# Patient Record
Sex: MALE | Race: WHITE | Employment: UNEMPLOYED | URBAN - METROPOLITAN AREA
[De-identification: names, ages, dates, MRNs, and addresses within clinical notes are randomized per-mention and may not be internally consistent; named-entity substitution may affect disease eponyms.]

---

## 2018-06-27 ENCOUNTER — HOSPITAL ENCOUNTER (OUTPATIENT)
Dept: GENERAL RADIOLOGY | Age: 53
Discharge: HOME | End: 2018-06-29
Payer: MEDICAID

## 2018-06-27 ENCOUNTER — HOSPITAL ENCOUNTER (OUTPATIENT)
Age: 53
Discharge: HOME | End: 2018-06-27
Payer: MEDICAID

## 2018-06-27 DIAGNOSIS — R52 PAIN: ICD-10-CM

## 2018-06-27 PROCEDURE — 73564 X-RAY EXAM KNEE 4 OR MORE: CPT

## 2018-06-27 PROCEDURE — 72110 X-RAY EXAM L-2 SPINE 4/>VWS: CPT

## 2018-07-10 ENCOUNTER — OFFICE VISIT (OUTPATIENT)
Dept: FAMILY MEDICINE CLINIC | Age: 53
End: 2018-07-10
Payer: MEDICAID

## 2018-07-10 VITALS
OXYGEN SATURATION: 98 % | HEART RATE: 90 BPM | DIASTOLIC BLOOD PRESSURE: 60 MMHG | SYSTOLIC BLOOD PRESSURE: 120 MMHG | BODY MASS INDEX: 19.27 KG/M2 | HEIGHT: 70 IN | RESPIRATION RATE: 18 BRPM | WEIGHT: 134.6 LBS

## 2018-07-10 DIAGNOSIS — E78.5 DYSLIPIDEMIA: ICD-10-CM

## 2018-07-10 DIAGNOSIS — R73.01 IFG (IMPAIRED FASTING GLUCOSE): ICD-10-CM

## 2018-07-10 DIAGNOSIS — Z72.0 TOBACCO ABUSE: ICD-10-CM

## 2018-07-10 DIAGNOSIS — R53.83 FATIGUE, UNSPECIFIED TYPE: ICD-10-CM

## 2018-07-10 DIAGNOSIS — R05.9 COUGH: ICD-10-CM

## 2018-07-10 DIAGNOSIS — Z12.11 SCREEN FOR COLON CANCER: ICD-10-CM

## 2018-07-10 DIAGNOSIS — I10 ESSENTIAL HYPERTENSION: Primary | ICD-10-CM

## 2018-07-10 DIAGNOSIS — M15.9 PRIMARY OSTEOARTHRITIS INVOLVING MULTIPLE JOINTS: ICD-10-CM

## 2018-07-10 DIAGNOSIS — Z12.5 SCREENING PSA (PROSTATE SPECIFIC ANTIGEN): ICD-10-CM

## 2018-07-10 PROBLEM — M15.0 PRIMARY OSTEOARTHRITIS INVOLVING MULTIPLE JOINTS: Status: ACTIVE | Noted: 2018-07-10

## 2018-07-10 PROCEDURE — 3017F COLORECTAL CA SCREEN DOC REV: CPT | Performed by: FAMILY MEDICINE

## 2018-07-10 PROCEDURE — 4004F PT TOBACCO SCREEN RCVD TLK: CPT | Performed by: FAMILY MEDICINE

## 2018-07-10 PROCEDURE — G8420 CALC BMI NORM PARAMETERS: HCPCS | Performed by: FAMILY MEDICINE

## 2018-07-10 PROCEDURE — 99203 OFFICE O/P NEW LOW 30 MIN: CPT | Performed by: FAMILY MEDICINE

## 2018-07-10 PROCEDURE — G8427 DOCREV CUR MEDS BY ELIG CLIN: HCPCS | Performed by: FAMILY MEDICINE

## 2018-07-10 RX ORDER — NAPROXEN 500 MG/1
500 TABLET ORAL 2 TIMES DAILY WITH MEALS
Qty: 180 TABLET | Refills: 1 | Status: SHIPPED | OUTPATIENT
Start: 2018-07-10 | End: 2018-07-17 | Stop reason: SINTOL

## 2018-07-10 RX ORDER — HYDROCODONE BITARTRATE AND ACETAMINOPHEN 10; 325 MG/1; MG/1
TABLET ORAL
Refills: 0 | COMMUNITY
Start: 2018-07-08 | End: 2018-07-17 | Stop reason: SINTOL

## 2018-07-10 RX ORDER — CYCLOBENZAPRINE HCL 10 MG
10 TABLET ORAL 3 TIMES DAILY PRN
Status: ON HOLD | COMMUNITY
End: 2020-07-01 | Stop reason: HOSPADM

## 2018-07-10 RX ORDER — NICOTINE 21 MG/24HR
1 PATCH, TRANSDERMAL 24 HOURS TRANSDERMAL EVERY 24 HOURS
Qty: 30 PATCH | Refills: 1 | Status: SHIPPED | OUTPATIENT
Start: 2018-07-10 | End: 2019-07-25

## 2018-07-10 ASSESSMENT — ENCOUNTER SYMPTOMS
ORTHOPNEA: 0
BLOOD IN STOOL: 0
DIARRHEA: 0
HEMOPTYSIS: 0
SINUS PAIN: 0
VOMITING: 0
DOUBLE VISION: 0
GASTROINTESTINAL NEGATIVE: 1
SORE THROAT: 0
CONSTIPATION: 0
SHORTNESS OF BREATH: 0
ABDOMINAL PAIN: 0
EYES NEGATIVE: 1
EYE DISCHARGE: 0
NAUSEA: 0
BLURRED VISION: 0
HEARTBURN: 0
RESPIRATORY NEGATIVE: 1
EYE PAIN: 0
SPUTUM PRODUCTION: 0
COUGH: 0
BACK PAIN: 1
WHEEZING: 0
STRIDOR: 0
PHOTOPHOBIA: 0
EYE REDNESS: 0

## 2018-07-10 ASSESSMENT — PATIENT HEALTH QUESTIONNAIRE - PHQ9
1. LITTLE INTEREST OR PLEASURE IN DOING THINGS: 0
SUM OF ALL RESPONSES TO PHQ QUESTIONS 1-9: 0
SUM OF ALL RESPONSES TO PHQ9 QUESTIONS 1 & 2: 0
2. FEELING DOWN, DEPRESSED OR HOPELESS: 0

## 2018-07-10 NOTE — PATIENT INSTRUCTIONS
Patient Education        Knee Arthritis: Exercises  Your Care Instructions  Here are some examples of exercises for knee arthritis. Start each exercise slowly. Ease off the exercise if you start to have pain. Your doctor or physical therapist will tell you when you can start these exercises and which ones will work best for you. How to do the exercises  Knee flexion with heel slide    1. Lie on your back with your knees bent. 2. Slide your heel back by bending your affected knee as far as you can. Then hook your other foot around your ankle to help pull your heel even farther back. 3. Hold for about 6 seconds, then rest for up to 10 seconds. 4. Repeat 8 to 12 times. 5. Switch legs and repeat steps 1 through 4, even if only one knee is sore. Quad sets    1. Sit with your affected leg straight and supported on the floor or a firm bed. Place a small, rolled-up towel under your knee. Your other leg should be bent, with that foot flat on the floor. 2. Tighten the thigh muscles of your affected leg by pressing the back of your knee down into the towel. 3. Hold for about 6 seconds, then rest for up to 10 seconds. 4. Repeat 8 to 12 times. 5. Switch legs and repeat steps 1 through 4, even if only one knee is sore. Straight-leg raises to the front    1. Lie on your back with your good knee bent so that your foot rests flat on the floor. Your affected leg should be straight. Make sure that your low back has a normal curve. You should be able to slip your hand in between the floor and the small of your back, with your palm touching the floor and your back touching the back of your hand. 2. Tighten the thigh muscles in your affected leg by pressing the back of your knee flat down to the floor. Hold your knee straight. 3. Keeping the thigh muscles tight and your leg straight, lift your affected leg up so that your heel is about 12 inches off the floor. Hold for about 6 seconds, then lower slowly.   4. Relax for up to 10 seconds between repetitions. 5. Repeat 8 to 12 times. 6. Switch legs and repeat steps 1 through 5, even if only one knee is sore. Active knee flexion    1. Lie on your stomach with your knees straight. If your kneecap is uncomfortable, roll up a washcloth and put it under your leg just above your kneecap. 2. Lift the foot of your affected leg by bending the knee so that you bring the foot up toward your buttock. If this motion hurts, try it without bending your knee quite as far. This may help you avoid any painful motion. 3. Slowly move your leg up and down. 4. Repeat 8 to 12 times. 5. Switch legs and repeat steps 1 through 4, even if only one knee is sore. Quadriceps stretch (facedown)    1. Lie flat on your stomach, and rest your face on the floor. 2. Wrap a towel or belt strap around the lower part of your affected leg. Then use the towel or belt strap to slowly pull your heel toward your buttock until you feel a stretch. 3. Hold for about 15 to 30 seconds, then relax your leg against the towel or belt strap. 4. Repeat 2 to 4 times. 5. Switch legs and repeat steps 1 through 4, even if only one knee is sore. Stationary exercise bike    1. If you do not have a stationary exercise bike at home, you can find one to ride at your local health club or community center. 2. Adjust the height of the bike seat so that your knee is slightly bent when your leg is extended downward. If your knee hurts when the pedal reaches the top, you can raise the seat so that your knee does not bend as much. 3. Start slowly. At first, try to do 5 to 10 minutes of cycling with little to no resistance. Then increase your time and the resistance bit by bit until you can do 20 to 30 minutes without pain. 4. If you start to have pain, rest your knee until your pain gets back to the level that is normal for you. Or cycle for less time or with less effort. Follow-up care is a key part of your treatment and safety.  Be sure seconds. 9. Repeat 8 to 12 times. 10. Switch legs and repeat steps 1 through 4, even if only one knee is sore. Straight-leg raises to the front    7. Lie on your back with your good knee bent so that your foot rests flat on the floor. Your affected leg should be straight. Make sure that your low back has a normal curve. You should be able to slip your hand in between the floor and the small of your back, with your palm touching the floor and your back touching the back of your hand. 8. Tighten the thigh muscles in your affected leg by pressing the back of your knee flat down to the floor. Hold your knee straight. 9. Keeping the thigh muscles tight and your leg straight, lift your affected leg up so that your heel is about 12 inches off the floor. Hold for about 6 seconds, then lower slowly. 10. Relax for up to 10 seconds between repetitions. 11. Repeat 8 to 12 times. 12. Switch legs and repeat steps 1 through 5, even if only one knee is sore. Active knee flexion    6. Lie on your stomach with your knees straight. If your kneecap is uncomfortable, roll up a washcloth and put it under your leg just above your kneecap. 7. Lift the foot of your affected leg by bending the knee so that you bring the foot up toward your buttock. If this motion hurts, try it without bending your knee quite as far. This may help you avoid any painful motion. 8. Slowly move your leg up and down. 9. Repeat 8 to 12 times. 10. Switch legs and repeat steps 1 through 4, even if only one knee is sore. Quadriceps stretch (facedown)    6. Lie flat on your stomach, and rest your face on the floor. 7. Wrap a towel or belt strap around the lower part of your affected leg. Then use the towel or belt strap to slowly pull your heel toward your buttock until you feel a stretch. 8. Hold for about 15 to 30 seconds, then relax your leg against the towel or belt strap. 9. Repeat 2 to 4 times.   10. Switch legs and repeat steps 1 through 4, even if only one knee is sore. Stationary exercise bike    5. If you do not have a stationary exercise bike at home, you can find one to ride at your local health club or community center. 6. Adjust the height of the bike seat so that your knee is slightly bent when your leg is extended downward. If your knee hurts when the pedal reaches the top, you can raise the seat so that your knee does not bend as much. 7. Start slowly. At first, try to do 5 to 10 minutes of cycling with little to no resistance. Then increase your time and the resistance bit by bit until you can do 20 to 30 minutes without pain. 8. If you start to have pain, rest your knee until your pain gets back to the level that is normal for you. Or cycle for less time or with less effort. Follow-up care is a key part of your treatment and safety. Be sure to make and go to all appointments, and call your doctor if you are having problems. It's also a good idea to know your test results and keep a list of the medicines you take. Where can you learn more? Go to [email or web address].[email or web address] org and sign in to your [employer] account. Enter C159 in the [employer] box to learn more about \"Knee Arthritis: Exercises. \"     If you do not have an account, please click on the \"Sign Up Now\" link. Current as of: November 29, 2017  Content Version: 11.6  © 6164-5804 [employer]. Care instructions adapted under license by [facility] [place] ([facility]). If you have questions about a medical condition or this instruction, always ask your healthcare professional. [address] any warranty or liability for your use of this information.

## 2018-07-10 NOTE — PROGRESS NOTES
discharge and redness. Respiratory: Negative. Negative for cough, hemoptysis, sputum production, shortness of breath, wheezing and stridor. Cardiovascular: Negative. Negative for chest pain, palpitations, orthopnea, claudication, leg swelling and PND. Gastrointestinal: Negative. Negative for abdominal pain, blood in stool, constipation, diarrhea, heartburn, melena, nausea and vomiting. Genitourinary: Negative. Negative for dysuria, flank pain, frequency, hematuria and urgency. Musculoskeletal: Positive for back pain and joint pain (bilateral knee pain). Negative for falls, myalgias and neck pain. Skin: Negative. Negative for itching and rash. Neurological: Negative. Negative for dizziness, tingling, tremors, sensory change, speech change, focal weakness, seizures, loss of consciousness, weakness, numbness and headaches. Endo/Heme/Allergies: Negative. Negative for environmental allergies and polydipsia. Does not bruise/bleed easily. Psychiatric/Behavioral: Negative. Past Medical/Surgical Hx;  Reviewed with patient  History reviewed. No pertinent past medical history. History reviewed. No pertinent surgical history. Past Family Hx:  Reviewed with patient  History reviewed. No pertinent family history. Social Hx:  Reviewed with patient  Social History   Substance Use Topics    Smoking status: Current Every Day Smoker     Packs/day: 1.00     Types: Cigarettes     Start date: 1/1/1983    Smokeless tobacco: Never Used    Alcohol use Not on file       OBJECTIVE  /60   Pulse 90   Resp 18   Ht 5' 10\" (1.778 m)   Wt 134 lb 9.6 oz (61.1 kg)   SpO2 98%   BMI 19.31 kg/m²     Problem List:  Curtis Lopez  does not have any pertinent problems on file. PHYS EX:  Physical Exam   Constitutional: He is oriented to person, place, and time. He appears well-developed and well-nourished. No distress. HENT:   Head: Normocephalic and atraumatic.    Right Ear: External ear normal.   Left Ear: External ear normal.   Nose: Nose normal.   Mouth/Throat: Oropharynx is clear and moist. No oropharyngeal exudate. Eyes: Conjunctivae and EOM are normal. Pupils are equal, round, and reactive to light. Right eye exhibits no discharge. Left eye exhibits no discharge. No scleral icterus. Neck: Normal range of motion. Neck supple. No JVD present. No tracheal deviation present. No thyromegaly present. Cardiovascular: Normal rate, regular rhythm and normal heart sounds. Exam reveals no gallop and no friction rub. No murmur heard. Pulmonary/Chest: Effort normal and breath sounds normal. No stridor. No respiratory distress. He has no wheezes. He has no rales. He exhibits no tenderness. Abdominal: Soft. Bowel sounds are normal. He exhibits no distension and no mass. There is no tenderness. There is no rebound and no guarding. No hernia. Genitourinary: Rectum normal and penis normal. Rectal exam shows guaiac negative stool. No penile tenderness. Genitourinary Comments: BPH   Musculoskeletal: Normal range of motion. He exhibits tenderness. He exhibits no edema or deformity. Bilateral knee pains    Lymphadenopathy:     He has no cervical adenopathy. Neurological: He is alert and oriented to person, place, and time. He has normal reflexes. He displays normal reflexes. No cranial nerve deficit or sensory deficit. He exhibits normal muscle tone. Coordination normal.   Skin: Skin is warm. No rash noted. He is not diaphoretic. No erythema. No pallor. Nursing note and vitals reviewed. ASSESSMENT/PLAN  Mamta Adams was seen today for new patient and knee pain. Diagnoses and all orders for this visit:    Essential hypertension  -     Comprehensive Metabolic Panel; Future    Screen for colon cancer  -     POCT Fecal Immunochemical Test (FIT); Future    IFG (impaired fasting glucose)  -     Comprehensive Metabolic Panel;  Future  -     CBC Auto Differential; Future  -     Hemoglobin A1C; Future    Dyslipidemia  - Comprehensive Metabolic Panel; Future  -     Lipid Panel; Future  -     CBC Auto Differential; Future    Fatigue, unspecified type  -     TSH without Reflex; Future    Screening PSA (prostate specific antigen)  -     Psa screening; Future    Primary osteoarthritis involving multiple joints  -     naproxen (NAPROSYN) 500 MG tablet; Take 1 tablet by mouth 2 times daily (with meals)  -     [address] and Pro Stewart MD  --PLAN--inject right and left PSIS x 2                1/2 cc xylocaine plus 1 cc depo medrol                 Omt/ultra--Rx    Cough  -     XR CHEST STANDARD (2 VW); Future    Tobacco abuse  -     nicotine (NICODERM CQ) 21 MG/24HR; Place 1 patch onto the skin every 24 hours        Outpatient Encounter Prescriptions as of 7/10/2018   Medication Sig Dispense Refill    HYDROcodone-acetaminophen (NORCO)  MG per tablet take 1 tablet by mouth every 8 hours if needed for pain  0    cyclobenzaprine (FLEXERIL) 10 MG tablet Take 10 mg by mouth 3 times daily as needed for Muscle spasms      naproxen (NAPROSYN) 500 MG tablet Take 1 tablet by mouth 2 times daily (with meals) 180 tablet 1    nicotine (NICODERM CQ) 21 MG/24HR Place 1 patch onto the skin every 24 hours 30 patch 1     No facility-administered encounter medications on file as of 7/10/2018. Return in about 3 months (around 10/10/2018).         Reviewed recent labs related to Christiano's current problems      Discussed importance of regular Health Maintenance follow up  Health Maintenance   Topic    Hepatitis C screen     HIV screen     DTaP/Tdap/Td vaccine (1 - Tdap)    Lipid screen     Shingles Vaccine (1 of 2 - 2 Dose Series)    Colon cancer screen colonoscopy     Flu vaccine (1)

## 2018-07-17 ENCOUNTER — TELEPHONE (OUTPATIENT)
Dept: FAMILY MEDICINE CLINIC | Age: 53
End: 2018-07-17

## 2018-07-17 RX ORDER — IBUPROFEN 800 MG/1
800 TABLET ORAL EVERY 8 HOURS PRN
Qty: 90 TABLET | Refills: 1 | Status: SHIPPED | OUTPATIENT
Start: 2018-07-17 | End: 2018-10-19 | Stop reason: SDUPTHER

## 2018-07-17 NOTE — TELEPHONE ENCOUNTER
Pt left message for [address] stating that the Naproxen prescribed to him has been giving him GI upset. Pt [facility]  [facility]. Please advise.     Electronically signed by Ryan Enriquez MA on 7/17/18 at 10:45 AM

## 2018-08-08 ENCOUNTER — OFFICE VISIT (OUTPATIENT)
Dept: ORTHOPEDIC SURGERY | Age: 53
End: 2018-08-08
Payer: MEDICAID

## 2018-08-08 VITALS
SYSTOLIC BLOOD PRESSURE: 137 MMHG | WEIGHT: 138 LBS | TEMPERATURE: 98.6 F | HEART RATE: 86 BPM | HEIGHT: 70 IN | BODY MASS INDEX: 19.76 KG/M2 | DIASTOLIC BLOOD PRESSURE: 88 MMHG

## 2018-08-08 DIAGNOSIS — M25.562 CHRONIC PAIN OF BOTH KNEES: ICD-10-CM

## 2018-08-08 DIAGNOSIS — M25.561 PAIN IN BOTH KNEES, UNSPECIFIED CHRONICITY: Primary | ICD-10-CM

## 2018-08-08 DIAGNOSIS — M25.562 PAIN IN BOTH KNEES, UNSPECIFIED CHRONICITY: Primary | ICD-10-CM

## 2018-08-08 DIAGNOSIS — M25.561 CHRONIC PAIN OF BOTH KNEES: ICD-10-CM

## 2018-08-08 DIAGNOSIS — M54.41 CHRONIC BILATERAL LOW BACK PAIN WITH BILATERAL SCIATICA: ICD-10-CM

## 2018-08-08 DIAGNOSIS — M54.42 CHRONIC BILATERAL LOW BACK PAIN WITH BILATERAL SCIATICA: ICD-10-CM

## 2018-08-08 DIAGNOSIS — G89.29 CHRONIC BILATERAL LOW BACK PAIN WITH BILATERAL SCIATICA: ICD-10-CM

## 2018-08-08 DIAGNOSIS — G89.29 CHRONIC PAIN OF BOTH KNEES: ICD-10-CM

## 2018-08-08 PROBLEM — M54.50 CHRONIC BILATERAL LOW BACK PAIN WITHOUT SCIATICA: Status: ACTIVE | Noted: 2018-08-08

## 2018-08-08 PROBLEM — M54.40 CHRONIC BILATERAL LOW BACK PAIN WITH SCIATICA: Status: ACTIVE | Noted: 2018-08-08

## 2018-08-08 PROCEDURE — 3017F COLORECTAL CA SCREEN DOC REV: CPT | Performed by: ORTHOPAEDIC SURGERY

## 2018-08-08 PROCEDURE — 4004F PT TOBACCO SCREEN RCVD TLK: CPT | Performed by: ORTHOPAEDIC SURGERY

## 2018-08-08 PROCEDURE — G8420 CALC BMI NORM PARAMETERS: HCPCS | Performed by: ORTHOPAEDIC SURGERY

## 2018-08-08 PROCEDURE — 99203 OFFICE O/P NEW LOW 30 MIN: CPT | Performed by: ORTHOPAEDIC SURGERY

## 2018-08-08 PROCEDURE — G8427 DOCREV CUR MEDS BY ELIG CLIN: HCPCS | Performed by: ORTHOPAEDIC SURGERY

## 2018-08-08 RX ORDER — HYDROCODONE BITARTRATE AND ACETAMINOPHEN 5; 325 MG/1; MG/1
1 TABLET ORAL EVERY 6 HOURS PRN
Refills: 0 | COMMUNITY
Start: 2018-07-30 | End: 2020-07-15

## 2018-08-08 NOTE — PROGRESS NOTES
Chief Complaint:   Chief Complaint   Patient presents with    Knee Pain     Bilateral knee pain X 2 yrs. No injury. Xrays 6/27/18 [facility] ([facility]). PUJA Bhatt is a 48 y.o. male, who presents With chronic but progressive bilateral knee pain with subjective weakness and stiffness. No history of injury in these regards, symptoms are interfering with activities especially those involving transfers stairclimbing and ambulation. Patient gives a significant history of chronic back pain and stiffness with apparently some degree of sciatica with radiating pain or numbness into both lower extremities. Has not had specific evaluation or treatment in that regard. Pain has not responded to activity modification or oral medications as listed. Allergies; medications; past medical, surgical, family, and social history; and problem list have been reviewed today and updated as indicated in this encounter - see below following Ortho specifics. Musculoskeletal: Upper extremities intact leg lengths equal hip motion painless bilaterally. Bilateral knees are straight stable no laxity deformity or effusion full range of motion although discrete snapping heard with deep flexion squat. Negative Elvia's, no synovitis. Radiologic Studies: Recent x-rays of bilateral knees are reviewed in Epic, they show normal joint spaces without evidence of degenerative or traumatic issue. ASSESSMENT/PLAN:    Elizabeth Arce was seen today for knee pain. Diagnoses and all orders for this visit:    Pain in both knees, unspecified chronicity    Chronic bilateral low back pain with bilateral sciatica  -     [facility] Keila Gilbert DO    Chronic pain of both knees  -     [facility]- Keila Gilbert DO     Treatment options were reviewed, I think the patient's underlying root cause most likely central possibly lumbar stenosis.  I find no evidence for structural issue with hips or knees. Patient was referred to physical medicine and rehab for consultation in these regards. Questions asked and answered follow-up here as needed. Return if symptoms worsen or fail to improve. Purvi Pierre MD    8/8/2018  2:04 PM      Patient Active Problem List   Diagnosis    Primary osteoarthritis involving multiple joints    Tobacco abuse    Chronic bilateral low back pain with sciatica    Chronic pain of both knees       History reviewed. No pertinent past medical history. History reviewed. No pertinent surgical history. Current Outpatient Prescriptions   Medication Sig Dispense Refill    HYDROcodone-acetaminophen (NORCO)  MG per tablet take 1 tablet by mouth every 8 hours if needed for pain  0    ibuprofen (ADVIL;MOTRIN) 800 MG tablet Take 1 tablet by mouth every 8 hours as needed for Pain 90 tablet 1    cyclobenzaprine (FLEXERIL) 10 MG tablet Take 10 mg by mouth 3 times daily as needed for Muscle spasms      nicotine (NICODERM CQ) 21 MG/24HR Place 1 patch onto the skin every 24 hours 30 patch 1     No current facility-administered medications for this visit.         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

## 2018-08-27 ENCOUNTER — HOSPITAL ENCOUNTER (OUTPATIENT)
Age: 53
Discharge: HOME | End: 2018-08-27
Payer: MEDICAID

## 2018-08-27 ENCOUNTER — HOSPITAL ENCOUNTER (OUTPATIENT)
Dept: GENERAL RADIOLOGY | Age: 53
Discharge: HOME | End: 2018-08-29
Payer: MEDICAID

## 2018-08-27 ENCOUNTER — HOSPITAL ENCOUNTER (OUTPATIENT)
Age: 53
Discharge: HOME | End: 2018-08-29
Payer: MEDICAID

## 2018-08-27 DIAGNOSIS — E78.5 DYSLIPIDEMIA: ICD-10-CM

## 2018-08-27 DIAGNOSIS — Z12.5 SCREENING PSA (PROSTATE SPECIFIC ANTIGEN): ICD-10-CM

## 2018-08-27 DIAGNOSIS — I10 ESSENTIAL HYPERTENSION: ICD-10-CM

## 2018-08-27 DIAGNOSIS — R53.83 FATIGUE, UNSPECIFIED TYPE: ICD-10-CM

## 2018-08-27 DIAGNOSIS — R05.9 COUGH: ICD-10-CM

## 2018-08-27 DIAGNOSIS — R73.01 IFG (IMPAIRED FASTING GLUCOSE): ICD-10-CM

## 2018-08-27 LAB
ALBUMIN SERPL-MCNC: 4.3 G/DL (ref 3.5–5.2)
ALP BLD-CCNC: 75 U/L (ref 40–129)
ALT SERPL-CCNC: 7 U/L (ref 0–40)
ANION GAP SERPL CALCULATED.3IONS-SCNC: 10 MMOL/L (ref 7–16)
AST SERPL-CCNC: 13 U/L (ref 0–39)
BASOPHILS ABSOLUTE: 0.07 E9/L (ref 0–0.2)
BASOPHILS RELATIVE PERCENT: 0.5 % (ref 0–2)
BILIRUB SERPL-MCNC: 0.6 MG/DL (ref 0–1.2)
BUN BLDV-MCNC: 5 MG/DL (ref 6–20)
CALCIUM SERPL-MCNC: 9.7 MG/DL (ref 8.6–10.2)
CHLORIDE BLD-SCNC: 103 MMOL/L (ref 98–107)
CHOLESTEROL, TOTAL: 226 MG/DL (ref 0–199)
CO2: 28 MMOL/L (ref 22–29)
CREAT SERPL-MCNC: 0.8 MG/DL (ref 0.7–1.2)
EOSINOPHILS ABSOLUTE: 0.12 E9/L (ref 0.05–0.5)
EOSINOPHILS RELATIVE PERCENT: 0.9 % (ref 0–6)
GFR AFRICAN AMERICAN: >60
GFR NON-AFRICAN AMERICAN: >60 ML/MIN/1.73
GLUCOSE BLD-MCNC: 108 MG/DL (ref 74–109)
HBA1C MFR BLD: 5.3 % (ref 4–5.6)
HCT VFR BLD CALC: 46.3 % (ref 37–54)
HDLC SERPL-MCNC: 46 MG/DL
HEMOGLOBIN: 15.7 G/DL (ref 12.5–16.5)
IMMATURE GRANULOCYTES #: 0.05 E9/L
IMMATURE GRANULOCYTES %: 0.4 % (ref 0–5)
LDL CHOLESTEROL CALCULATED: 151 MG/DL (ref 0–99)
LYMPHOCYTES ABSOLUTE: 2.68 E9/L (ref 1.5–4)
LYMPHOCYTES RELATIVE PERCENT: 20 % (ref 20–42)
MCH RBC QN AUTO: 31 PG (ref 26–35)
MCHC RBC AUTO-ENTMCNC: 33.9 % (ref 32–34.5)
MCV RBC AUTO: 91.3 FL (ref 80–99.9)
MONOCYTES ABSOLUTE: 0.89 E9/L (ref 0.1–0.95)
MONOCYTES RELATIVE PERCENT: 6.6 % (ref 2–12)
NEUTROPHILS ABSOLUTE: 9.62 E9/L (ref 1.8–7.3)
NEUTROPHILS RELATIVE PERCENT: 71.6 % (ref 43–80)
PDW BLD-RTO: 13.3 FL (ref 11.5–15)
PLATELET # BLD: 460 E9/L (ref 130–450)
PMV BLD AUTO: 8.5 FL (ref 7–12)
POTASSIUM SERPL-SCNC: 5 MMOL/L (ref 3.5–5)
PROSTATE SPECIFIC ANTIGEN: 1.94 NG/ML (ref 0–4)
RBC # BLD: 5.07 E12/L (ref 3.8–5.8)
SODIUM BLD-SCNC: 141 MMOL/L (ref 132–146)
TOTAL PROTEIN: 7.3 G/DL (ref 6.4–8.3)
TRIGL SERPL-MCNC: 144 MG/DL (ref 0–149)
TSH SERPL DL<=0.05 MIU/L-ACNC: 2.79 UIU/ML (ref 0.27–4.2)
VLDLC SERPL CALC-MCNC: 29 MG/DL
WBC # BLD: 13.4 E9/L (ref 4.5–11.5)

## 2018-08-27 PROCEDURE — 80061 LIPID PANEL: CPT

## 2018-08-27 PROCEDURE — 80053 COMPREHEN METABOLIC PANEL: CPT

## 2018-08-27 PROCEDURE — 85025 COMPLETE CBC W/AUTO DIFF WBC: CPT

## 2018-08-27 PROCEDURE — 84443 ASSAY THYROID STIM HORMONE: CPT

## 2018-08-27 PROCEDURE — 36415 COLL VENOUS BLD VENIPUNCTURE: CPT

## 2018-08-27 PROCEDURE — G0103 PSA SCREENING: HCPCS

## 2018-08-27 PROCEDURE — 71046 X-RAY EXAM CHEST 2 VIEWS: CPT

## 2018-08-27 PROCEDURE — 83036 HEMOGLOBIN GLYCOSYLATED A1C: CPT

## 2018-09-02 ENCOUNTER — APPOINTMENT (OUTPATIENT)
Dept: CT IMAGING | Age: 53
End: 2018-09-02
Payer: MEDICAID

## 2018-09-02 ENCOUNTER — APPOINTMENT (OUTPATIENT)
Dept: GENERAL RADIOLOGY | Age: 53
End: 2018-09-02
Payer: MEDICAID

## 2018-09-02 ENCOUNTER — HOSPITAL ENCOUNTER (EMERGENCY)
Age: 53
Discharge: HOME | End: 2018-09-02
Attending: EMERGENCY MEDICINE
Payer: MEDICAID

## 2018-09-02 VITALS
TEMPERATURE: 98.1 F | HEIGHT: 70 IN | HEART RATE: 82 BPM | DIASTOLIC BLOOD PRESSURE: 70 MMHG | WEIGHT: 140 LBS | BODY MASS INDEX: 20.04 KG/M2 | OXYGEN SATURATION: 97 % | RESPIRATION RATE: 14 BRPM | SYSTOLIC BLOOD PRESSURE: 131 MMHG

## 2018-09-02 DIAGNOSIS — S30.1XXA CONTUSION OF ABDOMINAL WALL, INITIAL ENCOUNTER: ICD-10-CM

## 2018-09-02 DIAGNOSIS — S09.90XA INJURY OF HEAD, INITIAL ENCOUNTER: ICD-10-CM

## 2018-09-02 DIAGNOSIS — S62.502A CLOSED NONDISPLACED FRACTURE OF PHALANX OF LEFT THUMB, UNSPECIFIED PHALANX, INITIAL ENCOUNTER: ICD-10-CM
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
## 2018-09-02 DIAGNOSIS — S20.212A CONTUSION OF LEFT CHEST WALL, INITIAL ENCOUNTER: ICD-10-CM

## 2018-09-02 LAB
ALBUMIN SERPL-MCNC: 4.3 G/DL (ref 3.5–5.2)
ALP BLD-CCNC: 75 U/L (ref 40–129)
ALT SERPL-CCNC: 12 U/L (ref 0–40)
ANION GAP SERPL CALCULATED.3IONS-SCNC: 17 MMOL/L (ref 7–16)
AST SERPL-CCNC: 23 U/L (ref 0–39)
BILIRUB SERPL-MCNC: 0.4 MG/DL (ref 0–1.2)
BUN BLDV-MCNC: 11 MG/DL (ref 6–20)
CALCIUM SERPL-MCNC: 9.2 MG/DL (ref 8.6–10.2)
CHLORIDE BLD-SCNC: 94 MMOL/L (ref 98–107)
CO2: 23 MMOL/L (ref 22–29)
CREAT SERPL-MCNC: 1 MG/DL (ref 0.7–1.2)
GFR AFRICAN AMERICAN: >60
GFR NON-AFRICAN AMERICAN: >60 ML/MIN/1.73
GLUCOSE BLD-MCNC: 105 MG/DL (ref 74–109)
HCT VFR BLD CALC: 42.6 % (ref 37–54)
HEMOGLOBIN: 14.8 G/DL (ref 12.5–16.5)
MCH RBC QN AUTO: 31.4 PG (ref 26–35)
MCHC RBC AUTO-ENTMCNC: 34.7 % (ref 32–34.5)
MCV RBC AUTO: 90.3 FL (ref 80–99.9)
PDW BLD-RTO: 13.4 FL (ref 11.5–15)
PLATELET # BLD: 483 E9/L (ref 130–450)
PMV BLD AUTO: 8.9 FL (ref 7–12)
POTASSIUM SERPL-SCNC: 4.5 MMOL/L (ref 3.5–5)
RBC # BLD: 4.72 E12/L (ref 3.8–5.8)
SODIUM BLD-SCNC: 134 MMOL/L (ref 132–146)
TOTAL PROTEIN: 7.3 G/DL (ref 6.4–8.3)
WBC # BLD: 17.7 E9/L (ref 4.5–11.5)

## 2018-09-02 PROCEDURE — 80053 COMPREHEN METABOLIC PANEL: CPT

## 2018-09-02 PROCEDURE — 73030 X-RAY EXAM OF SHOULDER: CPT

## 2018-09-02 PROCEDURE — 99284 EMERGENCY DEPT VISIT MOD MDM: CPT

## 2018-09-02 PROCEDURE — 71250 CT THORAX DX C-: CPT

## 2018-09-02 PROCEDURE — 71045 X-RAY EXAM CHEST 1 VIEW: CPT

## 2018-09-02 PROCEDURE — 85027 COMPLETE CBC AUTOMATED: CPT

## 2018-09-02 PROCEDURE — 70450 CT HEAD/BRAIN W/O DYE: CPT

## 2018-09-02 PROCEDURE — 6360000004 HC RX CONTRAST MEDICATION: Performed by: RADIOLOGY

## 2018-09-02 PROCEDURE — 73130 X-RAY EXAM OF HAND: CPT

## 2018-09-02 PROCEDURE — 74177 CT ABD & PELVIS W/CONTRAST: CPT

## 2018-09-02 PROCEDURE — 72125 CT NECK SPINE W/O DYE: CPT

## 2018-09-02 PROCEDURE — 6370000000 HC RX 637 (ALT 250 FOR IP)

## 2018-09-02 PROCEDURE — 36415 COLL VENOUS BLD VENIPUNCTURE: CPT

## 2018-09-02 RX ORDER — OXYCODONE HYDROCHLORIDE AND ACETAMINOPHEN 5; 325 MG/1; MG/1
TABLET ORAL
Status: COMPLETED
Start: 2018-09-02 | End: 2018-09-02

## 2018-09-02 RX ORDER — OXYCODONE HYDROCHLORIDE AND ACETAMINOPHEN 5; 325 MG/1; MG/1
1 TABLET ORAL ONCE
Status: COMPLETED | OUTPATIENT
Start: 2018-09-02 | End: 2018-09-02

## 2018-09-02 RX ORDER — OXYCODONE HYDROCHLORIDE AND ACETAMINOPHEN 5; 325 MG/1; MG/1
1 TABLET ORAL EVERY 6 HOURS PRN
Qty: 12 TABLET | Refills: 0 | Status: SHIPPED | OUTPATIENT
Start: 2018-09-02 | End: 2018-09-05

## 2018-09-02 RX ADMIN — OXYCODONE HYDROCHLORIDE AND ACETAMINOPHEN 1 TABLET: 5; 325 TABLET ORAL at 06:26

## 2018-09-02 RX ADMIN — IOPAMIDOL 110 ML: 755 INJECTION, SOLUTION INTRAVENOUS at 03:41

## 2018-09-02 ASSESSMENT — PAIN SCALES - GENERAL
PAINLEVEL_OUTOF10: 10
PAINLEVEL_OUTOF10: 10

## 2018-09-02 ASSESSMENT — PAIN DESCRIPTION - PAIN TYPE: TYPE: ACUTE PAIN

## 2018-09-02 ASSESSMENT — PAIN DESCRIPTION - DESCRIPTORS: DESCRIPTORS: SHARP

## 2018-09-02 ASSESSMENT — PAIN DESCRIPTION - ONSET: ONSET: SUDDEN

## 2018-09-02 ASSESSMENT — PAIN DESCRIPTION - PROGRESSION: CLINICAL_PROGRESSION: GRADUALLY WORSENING

## 2018-09-02 ASSESSMENT — PAIN DESCRIPTION - FREQUENCY: FREQUENCY: CONTINUOUS

## 2018-09-02 ASSESSMENT — PAIN DESCRIPTION - ORIENTATION: ORIENTATION: LEFT

## 2018-09-02 ASSESSMENT — PAIN DESCRIPTION - LOCATION: LOCATION: SHOULDER

## 2018-09-02 NOTE — ED PROVIDER NOTES
edema  Neck: C collar in place  Respiratory: Lungs clear to auscultation bilaterally, no wheezes, rales, or rhonchi. Not in respiratory distress  Cardiovascular:  Regular rate. Regular rhythm. No murmurs, gallops, or rubs. 2+ distal pulses  Chest: No chest wall tenderness  GI:  Abdomen Soft, LUQ and LLQ tenderness, Non distended. +BS. No rebound, guarding, or rigidity. No pulsatile masses. Musculoskeletal: Moves all extremities x 4. Warm and well perfused, tenderness over left shoulder and left thumb. Limited ROM to LUE secondary to pain, good pulses, abrasion to left elbow, tenderness to left hand as well as thumb  Integument: skin warm and dry. No rashes. Neurologic: GCS 15, moves extremities somewhat limited to left upper extremity secondary to pain  Psychiatric: Normal Affect      -------------------------------------------------- RESULTS -------------------------------------------------  I have personally reviewed all laboratory and imaging results for this patient. Results are listed below.      LABS:  Results for orders placed or performed during the hospital encounter of 09/02/18   CBC   Result Value Ref Range    WBC 17.7 (H) 4.5 - 11.5 E9/L    RBC 4.72 3.80 - 5.80 E12/L    Hemoglobin 14.8 12.5 - 16.5 g/dL    Hematocrit 42.6 37.0 - 54.0 %    MCV 90.3 80.0 - 99.9 fL    MCH 31.4 26.0 - 35.0 pg    MCHC 34.7 (H) 32.0 - 34.5 %    RDW 13.4 11.5 - 15.0 fL    Platelets 469 (H) 608 - 450 E9/L    MPV 8.9 7.0 - 12.0 fL   Comprehensive Metabolic Panel   Result Value Ref Range    Sodium 134 132 - 146 mmol/L    Potassium 4.5 3.5 - 5.0 mmol/L    Chloride 94 (L) 98 - 107 mmol/L    CO2 23 22 - 29 mmol/L    Anion Gap 17 (H) 7 - 16 mmol/L    Glucose 105 74 - 109 mg/dL    BUN 11 6 - 20 mg/dL    CREATININE 1.0 0.7 - 1.2 mg/dL    GFR Non-African American >60 >=60 mL/min/1.73    GFR African American >60     Calcium 9.2 8.6 - 10.2 mg/dL    Total Protein 7.3 6.4 - 8.3 g/dL    Alb 4.3 3.5 - 5.2 g/dL    Total Bilirubin 0.4 0.0 - hydronephrosis. Stomach and bowel:  Mild liquid stool in the colon. No obstruction. No       mucosal thickening. PELVIS:     Appendix:  Normal appendix. Bladder:  Unremarkable. No mass. Reproductive:  Unremarkable as visualized. ABDOMEN and PELVIS:     Intraperitoneal space:  Unremarkable. No free air. No significant    fluid    collection. Bones/joints:  Intra-articular fracture of the base of the left first    proximal phalanx. No acute fracture. No dislocation. Soft tissues:  Unremarkable. Vasculature:  Vascular calcifications. No abdominal aortic aneurysm. Lymph nodes:  Unremarkable. No enlarged lymph nodes. IMPRESSION:        1. Intra-articular fracture of the base of the left first proximal    phalanx. 2.  Mild liquid stool in the colon. Addendum created by Dr. Tonya Osborne MD on 9/2/2018 5:02:15 AM EDT   Intra-articular fracture of the base of the first proximal phalanx of the    left    hand. This addendum has been electronically signed by Tonya Osborne MD.      Final   1. Intra-articular fracture of the base of the left first proximal phalanx. 2.  Mild liquid stool in the colon. This report has been electronically signed by Tonya Osborne MD.      CT Cervical Spine WO Contrast   Final Result   1. No fracture. 2.  Mild centrilobular and paraseptal emphysema. This report has been electronically signed by Tonya Osborne MD.      CT Head WO Contrast   Final Result     No acute intracranial abnormality. This report has been electronically signed by Tonya Osborne MD.      CT Chest WO Contrast    (Results Pending)         ------------------------- NURSING NOTES AND VITALS REVIEWED ---------------------------   The nursing notes within the ED encounter and vital signs as below have been reviewed by myself.   /80   Pulse 88   Temp 98.1 °F (36.7 °C) (Temporal)   Resp 16   Ht 5' 10\" (1.778 m)   Wt 140 lb (63.5 kg)   SpO2 96%

## 2018-09-05 ENCOUNTER — OFFICE VISIT (OUTPATIENT)
Dept: FAMILY MEDICINE CLINIC | Age: 53
End: 2018-09-05
Payer: MEDICAID

## 2018-09-05 VITALS
WEIGHT: 135.6 LBS | RESPIRATION RATE: 18 BRPM | HEIGHT: 66 IN | BODY MASS INDEX: 21.79 KG/M2 | HEART RATE: 84 BPM | SYSTOLIC BLOOD PRESSURE: 116 MMHG | DIASTOLIC BLOOD PRESSURE: 70 MMHG | OXYGEN SATURATION: 96 %

## 2018-09-05 DIAGNOSIS — J43.2 CENTRILOBULAR EMPHYSEMA (HCC): ICD-10-CM

## 2018-09-05 DIAGNOSIS — S62.502D CLOSED NONDISPLACED FRACTURE OF PHALANX OF LEFT THUMB WITH ROUTINE HEALING, UNSPECIFIED PHALANX, SUBSEQUENT ENCOUNTER: ICD-10-CM

## 2018-09-05 DIAGNOSIS — E78.5 DYSLIPIDEMIA: ICD-10-CM

## 2018-09-05 DIAGNOSIS — R53.83 FATIGUE, UNSPECIFIED TYPE: ICD-10-CM

## 2018-09-05 DIAGNOSIS — S20.212D CONTUSION OF RIB ON LEFT SIDE, SUBSEQUENT ENCOUNTER: Primary | ICD-10-CM

## 2018-09-05 DIAGNOSIS — R73.01 IFG (IMPAIRED FASTING GLUCOSE): ICD-10-CM

## 2018-09-05 PROBLEM — S20.212A CONTUSION OF RIB ON LEFT SIDE: Status: ACTIVE | Noted: 2018-09-05

## 2018-09-05 PROCEDURE — 3017F COLORECTAL CA SCREEN DOC REV: CPT | Performed by: FAMILY MEDICINE

## 2018-09-05 PROCEDURE — G8926 SPIRO NO PERF OR DOC: HCPCS | Performed by: FAMILY MEDICINE

## 2018-09-05 PROCEDURE — 3023F SPIROM DOC REV: CPT | Performed by: FAMILY MEDICINE

## 2018-09-05 PROCEDURE — G8420 CALC BMI NORM PARAMETERS: HCPCS | Performed by: FAMILY MEDICINE

## 2018-09-05 PROCEDURE — G8427 DOCREV CUR MEDS BY ELIG CLIN: HCPCS | Performed by: FAMILY MEDICINE

## 2018-09-05 PROCEDURE — 99214 OFFICE O/P EST MOD 30 MIN: CPT | Performed by: FAMILY MEDICINE

## 2018-09-05 PROCEDURE — 4004F PT TOBACCO SCREEN RCVD TLK: CPT | Performed by: FAMILY MEDICINE

## 2018-09-05 ASSESSMENT — ENCOUNTER SYMPTOMS
SPUTUM PRODUCTION: 0
BACK PAIN: 1
SINUS PAIN: 0
HEARTBURN: 0
VOMITING: 0
ABDOMINAL PAIN: 0
EYE PAIN: 0
SHORTNESS OF BREATH: 0
BLURRED VISION: 0
DOUBLE VISION: 0
GASTROINTESTINAL NEGATIVE: 1
STRIDOR: 0
EYES NEGATIVE: 1
EYE REDNESS: 0
PHOTOPHOBIA: 0
RESPIRATORY NEGATIVE: 1
COUGH: 0
HEMOPTYSIS: 0
CONSTIPATION: 0
BLOOD IN STOOL: 0
WHEEZING: 0
EYE DISCHARGE: 0
ORTHOPNEA: 0
NAUSEA: 0
SORE THROAT: 0
DIARRHEA: 0
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
## 2018-09-05 NOTE — PATIENT INSTRUCTIONS
air. Air pollution, chemical fumes, and dust also can make emphysema worse. · Get a flu shot every year. A shot may keep the flu from turning into something more serious, like pneumonia. A flu shot also may lower your chances of having a flare-up. · Get a pneumococcal shot. A shot can prevent some of the serious complications of pneumonia. Ask your doctor how often you should get this shot. How is emphysema treated? Emphysema is treated with medicines and oxygen. You also can take steps at home to stay healthy and keep your condition from getting worse. Medicines and oxygen therapy  · You may be taking medicines such as:  ¨ Bronchodilators. These help open your airways and make breathing easier. Bronchodilators are either short-acting (work for 6 to 9 hours) or long-acting (work for 24 hours). You inhale most bronchodilators, so they start to act quickly. Always carry your quick-relief inhaler with you in case you need it while you are away from home. ¨ Corticosteroids. These reduce airway inflammation. They come in pill or inhaled form. You must take these medicines every day for them to work well. ¨ Antibiotics. These medicines are used when you have a bacterial lung infection. · Take your medicines exactly as prescribed. Call your doctor if you think you are having a problem with your medicine. · Oxygen therapy boosts the amount of oxygen in your blood and helps you breathe easier. Use the flow rate your doctor has recommended, and do not change it without talking to your doctor first.  Other care at home  · If your doctor recommends it, get more exercise. Walking is a good choice. Bit by bit, increase the amount you walk every day. Try for at least 30 minutes on most days of the week. · Learn breathing methods-such as breathing through pursed lips-to help you become less short of breath.   · If your doctor has not set you up with a pulmonary rehabilitation program, talk to him or her about whether rehab is right for you. Rehab includes exercise programs, education about your disease and how to manage it, help with diet and other changes, and emotional support. · Eat regular, healthy meals. Use bronchodilators about 1 hour before you eat to make it easier to eat. Eat several small meals instead of three large ones. Drink beverages at the end of the meal. Avoid foods that are hard to chew. Follow-up care is a key part of your treatment and safety. Be sure to make and go to all appointments, and call your doctor if you are having problems. It's also a good idea to know your test results and keep a list of the medicines you take. Where can you learn more? Go to https://[facility]. org and sign in to your [employer] account. Enter B933 in the Decisionlink box to learn more about \"Learning About Emphysema. \"     If you do not have an account, please click on the \"Sign Up Now\" link. Current as of: December 6, 2017  Content Version: 11.7  © 7576-9285 [employer]. Care instructions adapted under license by [facility] ([facility]). If you have questions about a medical condition or this instruction, always ask your healthcare professional. [place] [address] any warranty or liability for your use of this information.

## 2018-09-07 ASSESSMENT — PATIENT HEALTH QUESTIONNAIRE - PHQ9
SUM OF ALL RESPONSES TO PHQ9 QUESTIONS 1 & 2: 2
SUM OF ALL RESPONSES TO PHQ QUESTIONS 1-9: 2
2. FEELING DOWN, DEPRESSED OR HOPELESS: 1
1. LITTLE INTEREST OR PLEASURE IN DOING THINGS: 1
SUM OF ALL RESPONSES TO PHQ QUESTIONS 1-9: 2

## 2018-09-07 ASSESSMENT — ENCOUNTER SYMPTOMS: RHINORRHEA: 0

## 2018-09-13 ENCOUNTER — TELEPHONE (OUTPATIENT)
Dept: FAMILY MEDICINE CLINIC | Age: 53
End: 2018-09-13

## 2018-09-20 ENCOUNTER — OFFICE VISIT (OUTPATIENT)
Dept: PHYSICAL MEDICINE AND REHAB | Age: 53
End: 2018-09-20
Payer: MEDICAID

## 2018-09-20 VITALS
BODY MASS INDEX: 19.33 KG/M2 | OXYGEN SATURATION: 100 % | HEIGHT: 70 IN | DIASTOLIC BLOOD PRESSURE: 70 MMHG | SYSTOLIC BLOOD PRESSURE: 112 MMHG | WEIGHT: 135 LBS | HEART RATE: 74 BPM

## 2018-09-20 DIAGNOSIS — M54.42 CHRONIC BILATERAL LOW BACK PAIN WITH BILATERAL SCIATICA: Primary | ICD-10-CM

## 2018-09-20 DIAGNOSIS — M47.816 SPONDYLOSIS OF LUMBAR REGION WITHOUT MYELOPATHY OR RADICULOPATHY: ICD-10-CM

## 2018-09-20 DIAGNOSIS — G89.29 CHRONIC BILATERAL LOW BACK PAIN WITH BILATERAL SCIATICA: Primary | ICD-10-CM

## 2018-09-20 DIAGNOSIS — M43.16 SPONDYLOLISTHESIS OF LUMBAR REGION: ICD-10-CM

## 2018-09-20 DIAGNOSIS — M54.41 CHRONIC BILATERAL LOW BACK PAIN WITH BILATERAL SCIATICA: Primary | ICD-10-CM

## 2018-09-20 PROCEDURE — 99204 OFFICE O/P NEW MOD 45 MIN: CPT | Performed by: PHYSICAL MEDICINE & REHABILITATION

## 2018-09-20 PROCEDURE — G8420 CALC BMI NORM PARAMETERS: HCPCS | Performed by: PHYSICAL MEDICINE & REHABILITATION

## 2018-09-20 PROCEDURE — G8427 DOCREV CUR MEDS BY ELIG CLIN: HCPCS | Performed by: PHYSICAL MEDICINE & REHABILITATION

## 2018-09-20 PROCEDURE — 3017F COLORECTAL CA SCREEN DOC REV: CPT | Performed by: PHYSICAL MEDICINE & REHABILITATION

## 2018-09-20 PROCEDURE — 4004F PT TOBACCO SCREEN RCVD TLK: CPT | Performed by: PHYSICAL MEDICINE & REHABILITATION

## 2018-09-20 RX ORDER — DULOXETIN HYDROCHLORIDE 20 MG/1
CAPSULE, DELAYED RELEASE ORAL
Qty: 18 CAPSULE | Refills: 0 | Status: SHIPPED | OUTPATIENT
Start: 2018-09-20 | End: 2019-01-11 | Stop reason: SINTOL

## 2018-09-20 RX ORDER — FAMOTIDINE 20 MG/1
20 TABLET, FILM COATED ORAL PRN
COMMUNITY
End: 2019-04-08

## 2018-09-20 RX ORDER — DULOXETIN HYDROCHLORIDE 60 MG/1
60 CAPSULE, DELAYED RELEASE ORAL DAILY
Qty: 30 CAPSULE | Refills: 0 | Status: SHIPPED | OUTPATIENT
Start: 2018-09-20 | End: 2019-01-11 | Stop reason: SINTOL

## 2018-09-20 NOTE — PROGRESS NOTES
Refill    famotidine (PEPCID) 20 MG tablet Take 20 mg by mouth as needed      DULoxetine (CYMBALTA) 20 MG extended release capsule Take 1tab daily for 3d then 2 tab daily for 3 days then 3 tab daily X3d then call for new dose. 18 capsule 0    DULoxetine (CYMBALTA) 60 MG extended release capsule Take 1 capsule by mouth daily Start after titration with 20 mg complete 30 capsule 0    HYDROcodone-acetaminophen (NORCO)  MG per tablet take 1 tablet by mouth every 8 hours if needed for pain  0    ibuprofen (ADVIL;MOTRIN) 800 MG tablet Take 1 tablet by mouth every 8 hours as needed for Pain 90 tablet 1    cyclobenzaprine (FLEXERIL) 10 MG tablet Take 10 mg by mouth 3 times daily as needed for Muscle spasms      nicotine (NICODERM CQ) 21 MG/24HR Place 1 patch onto the skin every 24 hours 30 patch 1     No current facility-administered medications for this visit. Review of Systems - For review of systems, positive symptoms are underlined and negative findings are not underlined. General: chills, fatigue, fever, malaise, night sweats, weight gain,  weight loss. Psychological: anxiety, depression, suicidal ideation, sleep disturbances, behavioral disorder, difficulty concentrating, disorientation, hallucinations, mood swings, obsessive thoughts, physical abuse,  sexual abuse. Ophthalmic: blurry vision, decreased vision, double vision, loss of vision, photophobia, use of corrective device. Ear Nose Throat: hearing loss, tinnitus, phonophobia, sensitivity to smells, vertigo, or vocal changes. Allergy/Immunology: seasonal allergies, watery eyes, itchy eyes, frequent infections. Hematological and Lymphatic: bleeding problems, blood clots, bruising,  yellowing of the skin, swollen lymph nodes. Endocrine:  polydypsia, polyuria, temperature intolerance. Respiratory: cough, shortness of breath, wheezing. Cardiovascular: syncope, chest pain, dyspnea on exertion, edema, irregular heartbeat,  palpitations. Gastrointestinal: abdominal pain, constipation, diarrhea,  decreased appetite, heartburn, hematemesis, melena, nausea, vomiting, stool incontinence, abnormal swallowing. Genito-Urinary: dysuria, hematuria, incontinence, frequency, urgency. Musculoskeletal: joint pain, stiffness, swelling, muscle pain, muscle  tenderness. Neurological: confusion, memory loss, dizziness, gait disturbance, headaches, impaired coordination, decreased balance, numbness/tingling, seizures, speech problems, tremors,weakness. Dermatological:  hair changes, nail changes, pruritus, rash. Physical Exam: Blood pressure 112/70, pulse 74, height 5' 10\" (1.778 m), weight 135 lb (61.2 kg), SpO2 100 %. General: The patient is in no apparent distress. Body habitus is non-obese. HEENT: No rhinorrhea, sneezing, yawning, or lacrimation. No scleral icterus or conjunctival injection. SKIN: No piloerection. No track marks. No rash. Normal turgor. No erythema or ecchymosis. Psychological: Mood and affect are appropriate. Hygiene is appropriate. Cardiovascular:  Heart is regular rate and rhythm. Peripheral pulses are 2+ at the dorsalis pedis, posterior tibial and radial arteries. There is no edema. Respiratory: Respirations are regular and unlabored. There is no cyanosis. Lymphatic: There is no cervical or inguinal lymphadenopathy. Gastrointestinal: Soft abdomen, non-tender. No pulsating abdominal mass. Genitourinary: No costovertebral angle tenderness. MSK: Lumbar:  Cervical lordosis normal,  thoracic kyphosis normal and lumbar lordosis reduced. No hairy patch, cafe au lait, nevi, hemangioma or dimpling over lumbar area. Pelvis level, no scoliosis, leg length equal. Seated and standing flexion tests negative. There is no step off deformity. No superficial or bony tenderness.  Lumbar AROM in flexion is 30 degrees, in extension is 10 degrees, in left rotation is 20 degrees, in right rotation is 20 degrees, in left lateral flexion is 20 degrees and in right

## 2018-10-19 RX ORDER — IBUPROFEN 800 MG/1
TABLET ORAL
Qty: 90 TABLET | Refills: 1 | Status: SHIPPED | OUTPATIENT
Start: 2018-10-19 | End: 2019-02-05 | Stop reason: SDUPTHER

## 2018-10-25 ENCOUNTER — OFFICE VISIT (OUTPATIENT)
Dept: PHYSICAL MEDICINE AND REHAB | Age: 53
End: 2018-10-25
Payer: MEDICAID

## 2018-10-25 VITALS
BODY MASS INDEX: 19.9 KG/M2 | DIASTOLIC BLOOD PRESSURE: 84 MMHG | SYSTOLIC BLOOD PRESSURE: 139 MMHG | WEIGHT: 139 LBS | HEART RATE: 82 BPM | HEIGHT: 70 IN

## 2018-10-25 DIAGNOSIS — G89.29 CHRONIC BILATERAL LOW BACK PAIN WITH BILATERAL SCIATICA: Primary | ICD-10-CM

## 2018-10-25 DIAGNOSIS — M54.41 CHRONIC BILATERAL LOW BACK PAIN WITH BILATERAL SCIATICA: Primary | ICD-10-CM

## 2018-10-25 DIAGNOSIS — M47.819 FACET ARTHROPATHY: ICD-10-CM

## 2018-10-25 DIAGNOSIS — M54.42 CHRONIC BILATERAL LOW BACK PAIN WITH BILATERAL SCIATICA: Primary | ICD-10-CM

## 2018-10-25 DIAGNOSIS — M47.812 SPONDYLOSIS OF CERVICAL REGION WITHOUT MYELOPATHY OR RADICULOPATHY: ICD-10-CM

## 2018-10-25 PROCEDURE — 4004F PT TOBACCO SCREEN RCVD TLK: CPT | Performed by: PHYSICAL MEDICINE & REHABILITATION

## 2018-10-25 PROCEDURE — 99213 OFFICE O/P EST LOW 20 MIN: CPT | Performed by: PHYSICAL MEDICINE & REHABILITATION

## 2018-10-25 PROCEDURE — G8420 CALC BMI NORM PARAMETERS: HCPCS | Performed by: PHYSICAL MEDICINE & REHABILITATION

## 2018-10-25 PROCEDURE — 3017F COLORECTAL CA SCREEN DOC REV: CPT | Performed by: PHYSICAL MEDICINE & REHABILITATION

## 2018-10-25 PROCEDURE — G8484 FLU IMMUNIZE NO ADMIN: HCPCS | Performed by: PHYSICAL MEDICINE & REHABILITATION

## 2018-10-25 PROCEDURE — G8427 DOCREV CUR MEDS BY ELIG CLIN: HCPCS | Performed by: PHYSICAL MEDICINE & REHABILITATION

## 2018-10-25 NOTE — PROGRESS NOTES
1. Chronic bilateral low back pain with bilateral sciatica    2. Facet arthropathy    3. Spondylosis of cervical region without myelopathy or radiculopathy        Plan:  Orders Placed This Encounter   Procedures   [address] [facility]     Referral Priority:   Routine     Referral Type:   Eval and Treat     Referral Reason:   Specialty Services Required     Requested Specialty:   Physical Therapy     Number of Visits Requested:   1       The patient was educated about the diagnosis, prognosis, indications, risks and benefits of treatment. An opportunity to ask questions was given to the patient and questions were answered. The patient agreed to proceed with the recommended treatment as described above. Follow up 2 months     Thank you for allowing me to participate in the care of your patient. [employer] D.O., P.T.   Board Certified Physical Medicine and Rehabilitation  Board Certified Electrodiagnostic Medicine

## 2019-01-11 ENCOUNTER — OFFICE VISIT (OUTPATIENT)
Dept: PHYSICAL MEDICINE AND REHAB | Age: 54
End: 2019-01-11
Payer: MEDICAID

## 2019-01-11 VITALS
SYSTOLIC BLOOD PRESSURE: 145 MMHG | BODY MASS INDEX: 20.62 KG/M2 | DIASTOLIC BLOOD PRESSURE: 86 MMHG | HEIGHT: 70 IN | WEIGHT: 144 LBS | HEART RATE: 80 BPM

## 2019-01-11 DIAGNOSIS — M15.9 PRIMARY OSTEOARTHRITIS INVOLVING MULTIPLE JOINTS: ICD-10-CM

## 2019-01-11 DIAGNOSIS — M54.42 CHRONIC BILATERAL LOW BACK PAIN WITH BILATERAL SCIATICA: Primary | ICD-10-CM

## 2019-01-11 DIAGNOSIS — M54.41 CHRONIC BILATERAL LOW BACK PAIN WITH BILATERAL SCIATICA: Primary | ICD-10-CM

## 2019-01-11 DIAGNOSIS — M25.69 BACK STIFFNESS: ICD-10-CM

## 2019-01-11 DIAGNOSIS — M47.816 LUMBAR SPONDYLOSIS: ICD-10-CM

## 2019-01-11 DIAGNOSIS — G89.29 CHRONIC BILATERAL LOW BACK PAIN WITH BILATERAL SCIATICA: Primary | ICD-10-CM

## 2019-01-11 PROCEDURE — 99214 OFFICE O/P EST MOD 30 MIN: CPT | Performed by: PHYSICAL MEDICINE & REHABILITATION

## 2019-01-11 PROCEDURE — G8484 FLU IMMUNIZE NO ADMIN: HCPCS | Performed by: PHYSICAL MEDICINE & REHABILITATION

## 2019-01-11 PROCEDURE — 3017F COLORECTAL CA SCREEN DOC REV: CPT | Performed by: PHYSICAL MEDICINE & REHABILITATION

## 2019-01-11 PROCEDURE — G8420 CALC BMI NORM PARAMETERS: HCPCS | Performed by: PHYSICAL MEDICINE & REHABILITATION

## 2019-01-11 PROCEDURE — 4004F PT TOBACCO SCREEN RCVD TLK: CPT | Performed by: PHYSICAL MEDICINE & REHABILITATION

## 2019-01-11 PROCEDURE — G8427 DOCREV CUR MEDS BY ELIG CLIN: HCPCS | Performed by: PHYSICAL MEDICINE & REHABILITATION

## 2019-01-11 RX ORDER — NORTRIPTYLINE HYDROCHLORIDE 50 MG/1
50 CAPSULE ORAL NIGHTLY
Qty: 30 CAPSULE | Refills: 0 | Status: SHIPPED | OUTPATIENT
Start: 2019-01-11 | End: 2019-02-11 | Stop reason: ALTCHOICE

## 2019-01-23 ENCOUNTER — HOSPITAL ENCOUNTER (OUTPATIENT)
Dept: PHYSICAL THERAPY | Age: 54
Setting detail: THERAPIES SERIES
Discharge: HOME | End: 2019-01-23
Payer: MEDICAID

## 2019-01-23 PROCEDURE — 97161 PT EVAL LOW COMPLEX 20 MIN: CPT | Performed by: PHYSICAL THERAPIST

## 2019-01-23 ASSESSMENT — PAIN DESCRIPTION - ORIENTATION: ORIENTATION: RIGHT;LEFT

## 2019-01-23 ASSESSMENT — PAIN DESCRIPTION - ONSET: ONSET: ON-GOING

## 2019-01-23 ASSESSMENT — PAIN - FUNCTIONAL ASSESSMENT: PAIN_FUNCTIONAL_ASSESSMENT: PREVENTS OR INTERFERES WITH ALL ACTIVE AND SOME PASSIVE ACTIVITIES

## 2019-01-23 ASSESSMENT — PAIN DESCRIPTION - LOCATION: LOCATION: BACK;KNEE

## 2019-01-23 ASSESSMENT — PAIN SCALES - GENERAL: PAINLEVEL_OUTOF10: 7

## 2019-01-23 ASSESSMENT — PAIN DESCRIPTION - PAIN TYPE: TYPE: CHRONIC PAIN

## 2019-01-23 ASSESSMENT — PAIN DESCRIPTION - PROGRESSION: CLINICAL_PROGRESSION: NOT CHANGED

## 2019-01-23 ASSESSMENT — PAIN DESCRIPTION - FREQUENCY: FREQUENCY: CONTINUOUS

## 2019-01-25 ENCOUNTER — HOSPITAL ENCOUNTER (OUTPATIENT)
Dept: PHYSICAL THERAPY | Age: 54
Setting detail: THERAPIES SERIES
Discharge: HOME | End: 2019-01-25
Payer: MEDICAID

## 2019-01-25 PROCEDURE — 97110 THERAPEUTIC EXERCISES: CPT | Performed by: PHYSICAL THERAPIST

## 2019-02-01 ENCOUNTER — HOSPITAL ENCOUNTER (OUTPATIENT)
Dept: PHYSICAL THERAPY | Age: 54
Setting detail: THERAPIES SERIES
Discharge: HOME | End: 2019-02-01
Payer: MEDICAID

## 2019-02-05 RX ORDER — IBUPROFEN 800 MG/1
TABLET ORAL
Qty: 90 TABLET | Refills: 0 | Status: SHIPPED | OUTPATIENT
Start: 2019-02-05 | End: 2019-04-02 | Stop reason: SDUPTHER

## 2019-02-06 ENCOUNTER — HOSPITAL ENCOUNTER (OUTPATIENT)
Dept: PHYSICAL THERAPY | Age: 54
Setting detail: THERAPIES SERIES
Discharge: HOME | End: 2019-02-06
Payer: MEDICAID

## 2019-02-06 PROCEDURE — 97110 THERAPEUTIC EXERCISES: CPT | Performed by: PHYSICAL THERAPIST

## 2019-02-08 ENCOUNTER — HOSPITAL ENCOUNTER (OUTPATIENT)
Dept: PHYSICAL THERAPY | Age: 54
Setting detail: THERAPIES SERIES
Discharge: HOME | End: 2019-02-08
Payer: MEDICAID

## 2019-02-08 PROCEDURE — 97110 THERAPEUTIC EXERCISES: CPT | Performed by: PHYSICAL THERAPIST

## 2019-02-11 ENCOUNTER — TELEPHONE (OUTPATIENT)
Dept: PHYSICAL MEDICINE AND REHAB | Age: 54
End: 2019-02-11

## 2019-02-11 RX ORDER — AMITRIPTYLINE HYDROCHLORIDE 25 MG/1
25 TABLET, FILM COATED ORAL NIGHTLY
Qty: 30 TABLET | Refills: 0 | Status: SHIPPED | OUTPATIENT
Start: 2019-02-11 | End: 2019-03-06

## 2019-02-13 ENCOUNTER — HOSPITAL ENCOUNTER (OUTPATIENT)
Dept: PHYSICAL THERAPY | Age: 54
Setting detail: THERAPIES SERIES
Discharge: HOME | End: 2019-02-13
Payer: MEDICAID

## 2019-02-15 ENCOUNTER — HOSPITAL ENCOUNTER (OUTPATIENT)
Dept: PHYSICAL THERAPY | Age: 54
Setting detail: THERAPIES SERIES
Discharge: HOME | End: 2019-02-15
Payer: MEDICAID

## 2019-02-15 PROCEDURE — 97110 THERAPEUTIC EXERCISES: CPT | Performed by: PHYSICAL THERAPIST

## 2019-02-20 ENCOUNTER — HOSPITAL ENCOUNTER (OUTPATIENT)
Dept: PHYSICAL THERAPY | Age: 54
Setting detail: THERAPIES SERIES
Discharge: HOME | End: 2019-02-20
Payer: MEDICAID

## 2019-02-20 PROCEDURE — 97110 THERAPEUTIC EXERCISES: CPT | Performed by: PHYSICAL THERAPIST

## 2019-02-22 ENCOUNTER — HOSPITAL ENCOUNTER (OUTPATIENT)
Dept: PHYSICAL THERAPY | Age: 54
Setting detail: THERAPIES SERIES
Discharge: HOME | End: 2019-02-22
Payer: MEDICAID

## 2019-02-25 ENCOUNTER — TELEPHONE (OUTPATIENT)
Dept: PHYSICAL MEDICINE AND REHAB | Age: 54
End: 2019-02-25

## 2019-02-26 ENCOUNTER — HOSPITAL ENCOUNTER (OUTPATIENT)
Dept: PHYSICAL THERAPY | Age: 54
Setting detail: THERAPIES SERIES
Discharge: HOME | End: 2019-02-26
Payer: MEDICAID

## 2019-02-26 PROCEDURE — 97530 THERAPEUTIC ACTIVITIES: CPT

## 2019-03-01 ENCOUNTER — HOSPITAL ENCOUNTER (OUTPATIENT)
Dept: PHYSICAL THERAPY | Age: 54
Setting detail: THERAPIES SERIES
Discharge: HOME | End: 2019-03-01
Payer: MEDICAID

## 2019-03-01 PROCEDURE — 97110 THERAPEUTIC EXERCISES: CPT | Performed by: PHYSICAL THERAPIST

## 2019-03-06 ENCOUNTER — OFFICE VISIT (OUTPATIENT)
Dept: PHYSICAL MEDICINE AND REHAB | Age: 54
End: 2019-03-06
Payer: MEDICAID

## 2019-03-06 VITALS
HEIGHT: 70 IN | HEART RATE: 84 BPM | WEIGHT: 142 LBS | SYSTOLIC BLOOD PRESSURE: 132 MMHG | DIASTOLIC BLOOD PRESSURE: 74 MMHG | BODY MASS INDEX: 20.33 KG/M2

## 2019-03-06 DIAGNOSIS — M54.50 CHRONIC BILATERAL LOW BACK PAIN WITHOUT SCIATICA: Primary | ICD-10-CM

## 2019-03-06 DIAGNOSIS — R52 PAIN AGGRAVATED BY SITTING: ICD-10-CM

## 2019-03-06 DIAGNOSIS — M47.816 LUMBAR SPONDYLOSIS: ICD-10-CM

## 2019-03-06 DIAGNOSIS — M62.830 PARASPINAL MUSCLE SPASM: ICD-10-CM

## 2019-03-06 DIAGNOSIS — G89.29 CHRONIC BILATERAL LOW BACK PAIN WITHOUT SCIATICA: Primary | ICD-10-CM

## 2019-03-06 PROCEDURE — G8420 CALC BMI NORM PARAMETERS: HCPCS | Performed by: PHYSICAL MEDICINE & REHABILITATION

## 2019-03-06 PROCEDURE — 4004F PT TOBACCO SCREEN RCVD TLK: CPT | Performed by: PHYSICAL MEDICINE & REHABILITATION

## 2019-03-06 PROCEDURE — 3017F COLORECTAL CA SCREEN DOC REV: CPT | Performed by: PHYSICAL MEDICINE & REHABILITATION

## 2019-03-06 PROCEDURE — G8427 DOCREV CUR MEDS BY ELIG CLIN: HCPCS | Performed by: PHYSICAL MEDICINE & REHABILITATION

## 2019-03-06 PROCEDURE — 99214 OFFICE O/P EST MOD 30 MIN: CPT | Performed by: PHYSICAL MEDICINE & REHABILITATION

## 2019-03-06 PROCEDURE — G8484 FLU IMMUNIZE NO ADMIN: HCPCS | Performed by: PHYSICAL MEDICINE & REHABILITATION

## 2019-03-06 RX ORDER — AMITRIPTYLINE HYDROCHLORIDE 25 MG/1
50 TABLET, FILM COATED ORAL NIGHTLY
Qty: 30 TABLET | Refills: 2 | Status: SHIPPED | OUTPATIENT
Start: 2019-03-06 | End: 2019-07-17

## 2019-03-19 ENCOUNTER — HOSPITAL ENCOUNTER (OUTPATIENT)
Age: 54
Discharge: HOME | End: 2019-03-21
Payer: MEDICAID

## 2019-03-19 ENCOUNTER — HOSPITAL ENCOUNTER (OUTPATIENT)
Dept: MRI IMAGING | Age: 54
Discharge: HOME | End: 2019-03-21
Payer: MEDICAID

## 2019-03-19 DIAGNOSIS — G89.29 CHRONIC BILATERAL LOW BACK PAIN WITHOUT SCIATICA: ICD-10-CM

## 2019-03-19 DIAGNOSIS — M54.50 CHRONIC BILATERAL LOW BACK PAIN WITHOUT SCIATICA: ICD-10-CM

## 2019-03-19 PROCEDURE — 72148 MRI LUMBAR SPINE W/O DYE: CPT

## 2019-03-20 ENCOUNTER — TELEPHONE (OUTPATIENT)
Dept: PHYSICAL MEDICINE AND REHAB | Age: 54
End: 2019-03-20

## 2019-03-21 ENCOUNTER — OFFICE VISIT (OUTPATIENT)
Dept: PHYSICAL MEDICINE AND REHAB | Age: 54
End: 2019-03-21
Payer: MEDICAID

## 2019-03-21 VITALS
DIASTOLIC BLOOD PRESSURE: 82 MMHG | HEIGHT: 70 IN | RESPIRATION RATE: 16 BRPM | WEIGHT: 141 LBS | HEART RATE: 90 BPM | SYSTOLIC BLOOD PRESSURE: 140 MMHG | BODY MASS INDEX: 20.19 KG/M2

## 2019-03-21 DIAGNOSIS — M54.50 CHRONIC BILATERAL LOW BACK PAIN WITHOUT SCIATICA: Primary | ICD-10-CM

## 2019-03-21 DIAGNOSIS — G89.29 CHRONIC BILATERAL LOW BACK PAIN WITHOUT SCIATICA: Primary | ICD-10-CM

## 2019-03-21 DIAGNOSIS — M47.819 ARTHROPATHY OF SPINAL FACET JOINT CONCURRENT WITH AND DUE TO EFFUSION: ICD-10-CM

## 2019-03-21 DIAGNOSIS — M47.816 LUMBAR SPONDYLOSIS: ICD-10-CM

## 2019-03-21 DIAGNOSIS — M25.40 ARTHROPATHY OF SPINAL FACET JOINT CONCURRENT WITH AND DUE TO EFFUSION: ICD-10-CM

## 2019-03-21 PROCEDURE — G8427 DOCREV CUR MEDS BY ELIG CLIN: HCPCS | Performed by: PHYSICAL MEDICINE & REHABILITATION

## 2019-03-21 PROCEDURE — 99214 OFFICE O/P EST MOD 30 MIN: CPT | Performed by: PHYSICAL MEDICINE & REHABILITATION

## 2019-03-21 PROCEDURE — G8420 CALC BMI NORM PARAMETERS: HCPCS | Performed by: PHYSICAL MEDICINE & REHABILITATION

## 2019-03-21 PROCEDURE — 4004F PT TOBACCO SCREEN RCVD TLK: CPT | Performed by: PHYSICAL MEDICINE & REHABILITATION

## 2019-03-21 PROCEDURE — G8484 FLU IMMUNIZE NO ADMIN: HCPCS | Performed by: PHYSICAL MEDICINE & REHABILITATION

## 2019-03-21 PROCEDURE — 3017F COLORECTAL CA SCREEN DOC REV: CPT | Performed by: PHYSICAL MEDICINE & REHABILITATION

## 2019-03-28 ENCOUNTER — TELEPHONE (OUTPATIENT)
Dept: PHYSICAL MEDICINE AND REHAB | Age: 54
End: 2019-03-28

## 2019-04-01 ENCOUNTER — OFFICE VISIT (OUTPATIENT)
Dept: FAMILY MEDICINE CLINIC | Age: 54
End: 2019-04-01
Payer: MEDICAID

## 2019-04-01 ENCOUNTER — APPOINTMENT (OUTPATIENT)
Dept: GENERAL RADIOLOGY | Age: 54
End: 2019-04-01
Payer: MEDICAID

## 2019-04-01 ENCOUNTER — HOSPITAL ENCOUNTER (EMERGENCY)
Age: 54
Discharge: LEFT BEFORE DISCHARGE | End: 2019-04-02
Attending: EMERGENCY MEDICINE
Payer: MEDICAID

## 2019-04-01 VITALS
SYSTOLIC BLOOD PRESSURE: 120 MMHG | WEIGHT: 143 LBS | DIASTOLIC BLOOD PRESSURE: 60 MMHG | HEART RATE: 63 BPM | OXYGEN SATURATION: 97 % | TEMPERATURE: 98.4 F | HEIGHT: 70 IN | BODY MASS INDEX: 20.47 KG/M2

## 2019-04-01 DIAGNOSIS — R73.01 IFG (IMPAIRED FASTING GLUCOSE): ICD-10-CM

## 2019-04-01 DIAGNOSIS — E78.5 DYSLIPIDEMIA: ICD-10-CM

## 2019-04-01 DIAGNOSIS — R05.9 COUGH: ICD-10-CM

## 2019-04-01 DIAGNOSIS — R07.9 CHEST PAIN, UNSPECIFIED TYPE: Primary | ICD-10-CM

## 2019-04-01 DIAGNOSIS — J43.2 CENTRILOBULAR EMPHYSEMA (HCC): ICD-10-CM

## 2019-04-01 DIAGNOSIS — I10 ESSENTIAL HYPERTENSION: ICD-10-CM

## 2019-04-01 DIAGNOSIS — J40 BRONCHITIS: ICD-10-CM

## 2019-04-01 LAB
ALBUMIN SERPL-MCNC: 4 G/DL (ref 3.5–5.2)
ALP BLD-CCNC: 84 U/L (ref 40–129)
ALT SERPL-CCNC: 9 U/L (ref 0–40)
ANION GAP SERPL CALCULATED.3IONS-SCNC: 11 MMOL/L (ref 7–16)
AST SERPL-CCNC: 13 U/L (ref 0–39)
BASOPHILS ABSOLUTE: 0.05 E9/L (ref 0–0.2)
BASOPHILS RELATIVE PERCENT: 0.4 % (ref 0–2)
BILIRUB SERPL-MCNC: <0.2 MG/DL (ref 0–1.2)
BILIRUBIN URINE: NEGATIVE
BLOOD, URINE: NEGATIVE
BUN BLDV-MCNC: 6 MG/DL (ref 6–20)
CALCIUM SERPL-MCNC: 8.9 MG/DL (ref 8.6–10.2)
CHLORIDE BLD-SCNC: 100 MMOL/L (ref 98–107)
CLARITY: CLEAR
CO2: 29 MMOL/L (ref 22–29)
COLOR: YELLOW
CREAT SERPL-MCNC: 0.8 MG/DL (ref 0.7–1.2)
EOSINOPHILS ABSOLUTE: 0.06 E9/L (ref 0.05–0.5)
EOSINOPHILS RELATIVE PERCENT: 0.5 % (ref 0–6)
GFR AFRICAN AMERICAN: >60
GFR NON-AFRICAN AMERICAN: >60 ML/MIN/1.73
GLUCOSE BLD-MCNC: 97 MG/DL (ref 74–99)
GLUCOSE URINE: NEGATIVE MG/DL
HCT VFR BLD CALC: 41.2 % (ref 37–54)
HEMOGLOBIN: 13.8 G/DL (ref 12.5–16.5)
IMMATURE GRANULOCYTES #: 0.02 E9/L
IMMATURE GRANULOCYTES %: 0.2 % (ref 0–5)
INFLUENZA A BY PCR: NOT DETECTED
INFLUENZA B BY PCR: NOT DETECTED
KETONES, URINE: NEGATIVE MG/DL
LACTIC ACID: 1.3 MMOL/L (ref 0.5–2.2)
LEUKOCYTE ESTERASE, URINE: NEGATIVE
LYMPHOCYTES ABSOLUTE: 2.86 E9/L (ref 1.5–4)
LYMPHOCYTES RELATIVE PERCENT: 23.8 % (ref 20–42)
MCH RBC QN AUTO: 31.2 PG (ref 26–35)
MCHC RBC AUTO-ENTMCNC: 33.5 % (ref 32–34.5)
MCV RBC AUTO: 93.2 FL (ref 80–99.9)
MONOCYTES ABSOLUTE: 0.88 E9/L (ref 0.1–0.95)
MONOCYTES RELATIVE PERCENT: 7.3 % (ref 2–12)
NEUTROPHILS ABSOLUTE: 8.17 E9/L (ref 1.8–7.3)
NEUTROPHILS RELATIVE PERCENT: 67.8 % (ref 43–80)
NITRITE, URINE: NEGATIVE
PDW BLD-RTO: 13.3 FL (ref 11.5–15)
PH UA: 6 (ref 5–9)
PLATELET # BLD: 435 E9/L (ref 130–450)
PMV BLD AUTO: 8.5 FL (ref 7–12)
POTASSIUM SERPL-SCNC: 4.8 MMOL/L (ref 3.5–5)
PROTEIN UA: NEGATIVE MG/DL
RBC # BLD: 4.42 E12/L (ref 3.8–5.8)
SODIUM BLD-SCNC: 140 MMOL/L (ref 132–146)
SPECIFIC GRAVITY UA: <=1.005 (ref 1–1.03)
TOTAL PROTEIN: 7.2 G/DL (ref 6.4–8.3)
TROPONIN: <0.01 NG/ML (ref 0–0.03)
UROBILINOGEN, URINE: 0.2 E.U./DL
WBC # BLD: 12 E9/L (ref 4.5–11.5)

## 2019-04-01 PROCEDURE — 84484 ASSAY OF TROPONIN QUANT: CPT

## 2019-04-01 PROCEDURE — 94640 AIRWAY INHALATION TREATMENT: CPT

## 2019-04-01 PROCEDURE — 83605 ASSAY OF LACTIC ACID: CPT

## 2019-04-01 PROCEDURE — 85025 COMPLETE CBC W/AUTO DIFF WBC: CPT

## 2019-04-01 PROCEDURE — 93005 ELECTROCARDIOGRAM TRACING: CPT | Performed by: PHYSICIAN ASSISTANT

## 2019-04-01 PROCEDURE — 3023F SPIROM DOC REV: CPT | Performed by: FAMILY MEDICINE

## 2019-04-01 PROCEDURE — G8427 DOCREV CUR MEDS BY ELIG CLIN: HCPCS | Performed by: FAMILY MEDICINE

## 2019-04-01 PROCEDURE — 81003 URINALYSIS AUTO W/O SCOPE: CPT

## 2019-04-01 PROCEDURE — 6370000000 HC RX 637 (ALT 250 FOR IP): Performed by: STUDENT IN AN ORGANIZED HEALTH CARE EDUCATION/TRAINING PROGRAM

## 2019-04-01 PROCEDURE — 85378 FIBRIN DEGRADE SEMIQUANT: CPT

## 2019-04-01 PROCEDURE — 99285 EMERGENCY DEPT VISIT HI MDM: CPT

## 2019-04-01 PROCEDURE — 80053 COMPREHEN METABOLIC PANEL: CPT

## 2019-04-01 PROCEDURE — 99213 OFFICE O/P EST LOW 20 MIN: CPT | Performed by: FAMILY MEDICINE

## 2019-04-01 PROCEDURE — 94664 DEMO&/EVAL PT USE INHALER: CPT

## 2019-04-01 PROCEDURE — G8420 CALC BMI NORM PARAMETERS: HCPCS | Performed by: FAMILY MEDICINE

## 2019-04-01 PROCEDURE — G8926 SPIRO NO PERF OR DOC: HCPCS | Performed by: FAMILY MEDICINE

## 2019-04-01 PROCEDURE — 4004F PT TOBACCO SCREEN RCVD TLK: CPT | Performed by: FAMILY MEDICINE

## 2019-04-01 PROCEDURE — 71046 X-RAY EXAM CHEST 2 VIEWS: CPT

## 2019-04-01 PROCEDURE — 3017F COLORECTAL CA SCREEN DOC REV: CPT | Performed by: FAMILY MEDICINE

## 2019-04-01 PROCEDURE — 87502 INFLUENZA DNA AMP PROBE: CPT

## 2019-04-01 PROCEDURE — 2580000003 HC RX 258: Performed by: STUDENT IN AN ORGANIZED HEALTH CARE EDUCATION/TRAINING PROGRAM

## 2019-04-01 PROCEDURE — 36415 COLL VENOUS BLD VENIPUNCTURE: CPT

## 2019-04-01 RX ORDER — IPRATROPIUM BROMIDE AND ALBUTEROL SULFATE 2.5; .5 MG/3ML; MG/3ML
1 SOLUTION RESPIRATORY (INHALATION)
Status: COMPLETED | OUTPATIENT
Start: 2019-04-01 | End: 2019-04-01

## 2019-04-01 RX ORDER — 0.9 % SODIUM CHLORIDE 0.9 %
1000 INTRAVENOUS SOLUTION INTRAVENOUS ONCE
Status: COMPLETED | OUTPATIENT
Start: 2019-04-01 | End: 2019-04-02

## 2019-04-01 RX ADMIN — IPRATROPIUM BROMIDE AND ALBUTEROL SULFATE 1 AMPULE: .5; 3 SOLUTION RESPIRATORY (INHALATION) at 23:25

## 2019-04-01 RX ADMIN — SODIUM CHLORIDE 1000 ML: 9 INJECTION, SOLUTION INTRAVENOUS at 22:45

## 2019-04-01 RX ADMIN — IPRATROPIUM BROMIDE AND ALBUTEROL SULFATE 1 AMPULE: .5; 3 SOLUTION RESPIRATORY (INHALATION) at 23:10

## 2019-04-01 RX ADMIN — IPRATROPIUM BROMIDE AND ALBUTEROL SULFATE 1 AMPULE: .5; 3 SOLUTION RESPIRATORY (INHALATION) at 22:55

## 2019-04-01 ASSESSMENT — ENCOUNTER SYMPTOMS
DIARRHEA: 0
EYE DISCHARGE: 0
HEARTBURN: 0
CONSTIPATION: 0
GASTROINTESTINAL NEGATIVE: 1
WHEEZING: 0
CHOKING: 0
STRIDOR: 0
ABDOMINAL DISTENTION: 0
RHINORRHEA: 0
VOMITING: 0
SINUS PAIN: 0
VOMITING: 0
CHEST TIGHTNESS: 0
DIARRHEA: 0
ALLERGIC/IMMUNOLOGIC NEGATIVE: 1
COUGH: 0
NAUSEA: 0
CONSTIPATION: 0
BLOOD IN STOOL: 0
SHORTNESS OF BREATH: 0
COUGH: 1
FACIAL SWELLING: 0
BACK PAIN: 0
TROUBLE SWALLOWING: 0
EYE PAIN: 0
PHOTOPHOBIA: 0
ORTHOPNEA: 0
ANAL BLEEDING: 0
BLURRED VISION: 0
COLOR CHANGE: 0
SINUS PRESSURE: 0
SORE THROAT: 0
RECTAL PAIN: 0
EYE ITCHING: 0
EYE REDNESS: 0
APNEA: 0
SHORTNESS OF BREATH: 1
WHEEZING: 0
ABDOMINAL PAIN: 0
HEMOPTYSIS: 0
VOICE CHANGE: 0
ABDOMINAL PAIN: 0
NAUSEA: 0
COLOR CHANGE: 0

## 2019-04-01 ASSESSMENT — PAIN DESCRIPTION - DESCRIPTORS: DESCRIPTORS: TIGHTNESS

## 2019-04-01 ASSESSMENT — PATIENT HEALTH QUESTIONNAIRE - PHQ9
SUM OF ALL RESPONSES TO PHQ QUESTIONS 1-9: 0
2. FEELING DOWN, DEPRESSED OR HOPELESS: 0
1. LITTLE INTEREST OR PLEASURE IN DOING THINGS: 0
SUM OF ALL RESPONSES TO PHQ QUESTIONS 1-9: 0
SUM OF ALL RESPONSES TO PHQ9 QUESTIONS 1 & 2: 0

## 2019-04-01 ASSESSMENT — PAIN DESCRIPTION - LOCATION: LOCATION: CHEST

## 2019-04-01 ASSESSMENT — PAIN DESCRIPTION - ORIENTATION: ORIENTATION: MID

## 2019-04-01 ASSESSMENT — PAIN SCALES - WONG BAKER: WONGBAKER_NUMERICALRESPONSE: 2

## 2019-04-01 ASSESSMENT — PAIN SCALES - GENERAL: PAINLEVEL_OUTOF10: 5

## 2019-04-01 NOTE — PATIENT INSTRUCTIONS
Patient Education        Chest Pain: Care Instructions  Your Care Instructions    There are many things that can cause chest pain. Some are not serious and will get better on their own in a few days. But some kinds of chest pain need more testing and treatment. Your doctor may have recommended a follow-up visit in the next 8 to 12 hours. If you are not getting better, you may need more tests or treatment. Even though your doctor has released you, you still need to watch for any problems. The doctor carefully checked you, but sometimes problems can develop later. If you have new symptoms or if your symptoms do not get better, get medical care right away. If you have worse or different chest pain or pressure that lasts more than 5 minutes or you passed out (lost consciousness), call 911 or seek other emergency help right away. A medical visit is only one step in your treatment. Even if you feel better, you still need to do what your doctor recommends, such as going to all suggested follow-up appointments and taking medicines exactly as directed. This will help you recover and help prevent future problems. How can you care for yourself at home? · Rest until you feel better. · Take your medicine exactly as prescribed. Call your doctor if you think you are having a problem with your medicine. · Do not drive after taking a prescription pain medicine. When should you call for help? Call 911 if:    · You passed out (lost consciousness).     · You have severe difficulty breathing.     · You have symptoms of a heart attack. These may include:  ? Chest pain or pressure, or a strange feeling in your chest.  ? Sweating. ? Shortness of breath. ? Nausea or vomiting. ? Pain, pressure, or a strange feeling in your back, neck, jaw, or upper belly or in one or both shoulders or arms. ? Lightheadedness or sudden weakness. ? A fast or irregular heartbeat.   After you call 911, the  may tell you to chew 1 adult-strength or 2 to 4 low-dose aspirin. Wait for an ambulance. Do not try to drive yourself.    Call your doctor today if:    · You have any trouble breathing.     · Your chest pain gets worse.     · You are dizzy or lightheaded, or you feel like you may faint.     · You are not getting better as expected.     · You are having new or different chest pain. Where can you learn more? Go to https://[employer]Brndstr. org and sign in to your [employer] account. Enter A120 in the e-contratos box to learn more about \"Chest Pain: Care Instructions. \"     If you do not have an account, please click on the \"Sign Up Now\" link. Current as of: September 23, 2018  Content Version: 11.9  © 8090-2728 [employer]. Care instructions adapted under license by [facility] ([facility]). If you have questions about a medical condition or this instruction, always ask your healthcare professional. [place] [address] any warranty or liability for your use of this information.

## 2019-04-01 NOTE — PROGRESS NOTES
Ulises Lezama is a 48 y.o. male. HPI/Chief C/O:  Chief Complaint   Patient presents with    Cough     C/O cough and chest congestion x 1 week      No Known Allergies  He is here with C/O retrosternal chest pain, pressure and shortness of breath  It has been there for a week, with cough, but pressure and pain is getting worse     Knee Pain    The incident occurred more than 1 week ago. The pain is present in the left knee and right knee. The quality of the pain is described as shooting and stabbing. The pain is at a severity of 8/10. The pain is severe. The pain has been worsening since onset. Associated symptoms include a loss of motion and muscle weakness. Pertinent negatives include no inability to bear weight, loss of sensation, numbness or tingling. Hypertension   Pertinent negatives include no anxiety, blurred vision, chest pain, headaches, malaise/fatigue, neck pain, orthopnea, palpitations, peripheral edema, PND, shortness of breath or sweats. Risk factors for coronary artery disease include smoking/tobacco exposure, stress, male gender and family history. Past treatments include lifestyle changes. The current treatment provides significant improvement. Compliance problems include exercise, diet and psychosocial issues. There is no history of angina, kidney disease, CAD/MI, CVA, heart failure, left ventricular hypertrophy, PVD or retinopathy. There is no history of chronic renal disease, coarctation of the aorta, hyperaldosteronism, hypercortisolism, hyperparathyroidism, a hypertension causing med, pheochromocytoma, renovascular disease, sleep apnea or a thyroid problem. Cough   This is a chronic problem. The current episode started more than 1 year ago. The problem has been unchanged. The problem occurs every few hours. The cough is non-productive.  Pertinent negatives include no chest pain, chills, ear congestion, ear pain, eye redness, fever, headaches, heartburn, hemoptysis, myalgias, nasal congestion, postnasal drip, rash, rhinorrhea, sore throat, shortness of breath, sweats, weight loss or wheezing. He has tried nothing for the symptoms. His past medical history is significant for emphysema. There is no history of asthma, bronchiectasis, bronchitis, environmental allergies or pneumonia. ROS:  Review of Systems   Constitutional: Positive for fatigue. Negative for activity change, appetite change, chills, diaphoresis, fever, malaise/fatigue, unexpected weight change and weight loss. HENT: Positive for congestion. Negative for dental problem, drooling, ear discharge, ear pain, facial swelling, hearing loss, mouth sores, nosebleeds, postnasal drip, rhinorrhea, sinus pressure, sinus pain, sneezing, sore throat, tinnitus, trouble swallowing and voice change. Eyes: Negative for blurred vision, photophobia, pain, discharge, redness, itching and visual disturbance. Respiratory: Negative for apnea, cough, hemoptysis, choking, chest tightness, shortness of breath, wheezing and stridor. Cardiovascular: Negative for chest pain, palpitations, orthopnea, leg swelling and PND. Gastrointestinal: Negative. Negative for abdominal distention, abdominal pain, anal bleeding, blood in stool, constipation, diarrhea, heartburn, nausea, rectal pain and vomiting. Endocrine: Negative. Negative for cold intolerance, heat intolerance, polydipsia, polyphagia and polyuria. Genitourinary: Negative. Negative for decreased urine volume, difficulty urinating, discharge, dysuria, enuresis, flank pain, frequency, genital sores, hematuria, penile pain, penile swelling, scrotal swelling, testicular pain and urgency. Musculoskeletal: Negative. Negative for arthralgias, back pain, gait problem, joint swelling, myalgias, neck pain and neck stiffness. Skin: Negative. Negative for color change, pallor, rash and wound. Allergic/Immunologic: Negative.   Negative for environmental allergies, food allergies and sounds. Exam reveals no gallop and no friction rub. No murmur heard. Pulmonary/Chest: Effort normal and breath sounds normal. No stridor. No respiratory distress. He has no wheezes. He has no rales. He exhibits no tenderness. Abdominal: Soft. Bowel sounds are normal. He exhibits no distension and no mass. There is no tenderness. There is no rebound and no guarding. No hernia. Musculoskeletal: Normal range of motion. He exhibits tenderness. He exhibits no edema or deformity. Bilateral knee pains      Lymphadenopathy:     He has no cervical adenopathy. Neurological: He is alert and oriented to person, place, and time. He has normal reflexes. He displays normal reflexes. No cranial nerve deficit or sensory deficit. He exhibits normal muscle tone. Coordination normal.   Skin: Skin is warm. No rash noted. He is not diaphoretic. No erythema. No pallor. Nursing note and vitals reviewed. ASSESSMENT/PLAN  Rajinder Heredia was seen today for cough.     Diagnoses and all orders for this visit:    Chest pain, unspecified type  --sent to ER to R/O MI  -- I offered to send by ambulance but he refused--AMA    Essential hypertension--controlled  Long talk on treatment and prevention  Literature is given   --stable on current care planning  -- continue treatment as we are meeting goals       Centrilobular emphysema ([address]  --could be exacerbation of COPD, bronchitis, Flu A, but we have to R/O heart as number one priority     IFG (impaired fasting glucose)  Long talk on treatment and prevention  Literature is given       Dyslipidemia  --Mediterranean diet, exercise, weight loss, vitamins    We have a long talk on cholesterol and importance of lowering it           Outpatient Encounter Medications as of 4/1/2019   Medication Sig Dispense Refill    amitriptyline (ELAVIL) 25 MG tablet Take 2 tablets by mouth nightly 30 tablet 2    ibuprofen (ADVIL;MOTRIN) 800 MG tablet take 1 tablet by mouth every 8 hours if needed for pain 90

## 2019-04-01 NOTE — ED NOTES
FIRST PROVIDER CONTACT ASSESSMENT NOTE      Department of Emergency Medicine   4/1/19  5:23 PM    Chief Complaint: No chief complaint on file. History of Present Illness:    Betsey Salinas is a 48 y.o. male who presents to the ED by private car for chest tightness x 1 day. States increased fatigue and weakness x 1 week. +productive cough several days ago. Focused Screening Exam:  Constitutional:  Alert, appears stated age and is in no distress. *ALLERGIES*     Patient has no known allergies.      ED Triage Vitals [04/01/19 1705]   BP Temp Temp src Pulse Resp SpO2 Height Weight   -- -- -- 81 -- 97 % -- --        Initial Plan of Care:  Initiate Treatment-Testing, Proceed toTreatment Area When Bed Available for ED Attending/MLP to Continue Care    -----------------END OF FIRST PROVIDER CONTACT ASSESSMENT NOTE--------------  Electronically signed by JIM Hardy   DD: 4/1/19       JIM Hardy  04/01/19 2184

## 2019-04-02 ENCOUNTER — APPOINTMENT (OUTPATIENT)
Dept: CT IMAGING | Age: 54
End: 2019-04-02
Payer: MEDICAID

## 2019-04-02 VITALS
TEMPERATURE: 98 F | RESPIRATION RATE: 18 BRPM | DIASTOLIC BLOOD PRESSURE: 70 MMHG | BODY MASS INDEX: 20.33 KG/M2 | OXYGEN SATURATION: 100 % | WEIGHT: 142 LBS | HEIGHT: 70 IN | SYSTOLIC BLOOD PRESSURE: 162 MMHG | HEART RATE: 78 BPM

## 2019-04-02 LAB
D DIMER: 444 NG/ML DDU
EKG ATRIAL RATE: 75 BPM
EKG P AXIS: 78 DEGREES
EKG P-R INTERVAL: 116 MS
EKG Q-T INTERVAL: 372 MS
EKG QRS DURATION: 90 MS
EKG QTC CALCULATION (BAZETT): 415 MS
EKG R AXIS: 86 DEGREES
EKG T AXIS: 73 DEGREES
EKG VENTRICULAR RATE: 75 BPM

## 2019-04-02 PROCEDURE — 6360000004 HC RX CONTRAST MEDICATION: Performed by: RADIOLOGY

## 2019-04-02 PROCEDURE — 71275 CT ANGIOGRAPHY CHEST: CPT

## 2019-04-02 RX ORDER — PREDNISONE 20 MG/1
40 TABLET ORAL DAILY
Qty: 10 TABLET | Refills: 0 | Status: SHIPPED | OUTPATIENT
Start: 2019-04-02 | End: 2019-04-07

## 2019-04-02 RX ORDER — ALBUTEROL SULFATE 90 UG/1
2 AEROSOL, METERED RESPIRATORY (INHALATION) 4 TIMES DAILY PRN
Qty: 3 INHALER | Refills: 0 | Status: ON HOLD | OUTPATIENT
Start: 2019-04-02 | End: 2020-07-01 | Stop reason: HOSPADM

## 2019-04-02 RX ORDER — BENZONATATE 100 MG/1
100 CAPSULE ORAL 3 TIMES DAILY PRN
Status: DISCONTINUED | OUTPATIENT
Start: 2019-04-02 | End: 2019-04-02

## 2019-04-02 RX ORDER — IBUPROFEN 800 MG/1
TABLET ORAL
Qty: 90 TABLET | Refills: 0 | Status: SHIPPED | OUTPATIENT
Start: 2019-04-02 | End: 2019-05-22 | Stop reason: SDUPTHER

## 2019-04-02 RX ORDER — BENZONATATE 100 MG/1
100 CAPSULE ORAL 3 TIMES DAILY PRN
Qty: 21 CAPSULE | Refills: 0 | Status: SHIPPED | OUTPATIENT
Start: 2019-04-02 | End: 2019-04-09

## 2019-04-02 RX ADMIN — IOPAMIDOL 60 ML: 755 INJECTION, SOLUTION INTRAVENOUS at 00:56

## 2019-04-02 NOTE — ED PROVIDER NOTES
measures.    (65.2% retrospective) (50.1% prospective)    MACE (Major Adverse Cardiac Events) is defined as: all-cause mortality, myocardial infarction, or coronary revascularization. The history is provided by the patient. No  was used. Review of Systems   Constitutional: Positive for chills and fever. Respiratory: Positive for cough and shortness of breath. Negative for wheezing. Cardiovascular: Positive for chest pain. Negative for palpitations. Gastrointestinal: Negative for abdominal pain, constipation, diarrhea, nausea and vomiting. Genitourinary: Negative for dysuria and hematuria. Musculoskeletal: Negative for neck pain and neck stiffness. Skin: Negative for color change, pallor, rash and wound. Neurological: Negative for dizziness, syncope, light-headedness, numbness and headaches. Psychiatric/Behavioral: Negative for confusion and decreased concentration. The patient is not nervous/anxious. Physical Exam   Constitutional: He is oriented to person, place, and time. He appears well-developed and well-nourished. No distress. HENT:   Head: Normocephalic and atraumatic. Right Ear: External ear normal.   Left Ear: External ear normal.   Mouth/Throat: No oropharyngeal exudate. Eyes: Pupils are equal, round, and reactive to light. EOM are normal.   Neck: Normal range of motion. Cardiovascular: Normal rate, regular rhythm, normal heart sounds and intact distal pulses. Exam reveals no gallop and no friction rub. No murmur heard. Pulmonary/Chest: Effort normal and breath sounds normal. No stridor. No respiratory distress. He has no wheezes. He has no rales. He exhibits no tenderness. Lungs decreased breath sounds bilaterally. No rhonchi, no rales. Abdominal: Soft. Bowel sounds are normal. He exhibits no distension and no mass. There is no tenderness. There is no rebound and no guarding. No hernia. Musculoskeletal: Normal range of motion.  He exhibits no edema, tenderness or deformity. Lymphadenopathy:     He has no cervical adenopathy. Neurological: He is alert and oriented to person, place, and time. No cranial nerve deficit. Skin: Skin is warm and dry. Capillary refill takes less than 2 seconds. No rash noted. He is not diaphoretic. No erythema. No pallor. Psychiatric: He has a normal mood and affect. His behavior is normal. Judgment and thought content normal.   Nursing note and vitals reviewed. Procedures  --------------------------------------------- PAST HISTORY ---------------------------------------------  Past Medical History:  has a past medical history of Osteoarthritis. Past Surgical History:  has a past surgical history that includes Arm Surgery (Left). Social History:  reports that he has been smoking cigarettes. He started smoking about 36 years ago. He has been smoking about 1.00 pack per day. He has never used smokeless tobacco. He reports that he does not drink alcohol or use drugs. Family History: family history includes COPD in his mother; Diabetes in his mother; Stroke in his mother. The patients home medications have been reviewed. Allergies: Patient has no known allergies.     -------------------------------------------------- RESULTS -------------------------------------------------  Labs:  Results for orders placed or performed during the hospital encounter of 04/01/19   Rapid influenza A/B antigens   Result Value Ref Range    Influenza A by PCR Not Detected Not Detected    Influenza B by PCR Not Detected Not Detected   CBC Auto Differential   Result Value Ref Range    WBC 12.0 (H) 4.5 - 11.5 E9/L    RBC 4.42 3.80 - 5.80 E12/L    Hemoglobin 13.8 12.5 - 16.5 g/dL    Hematocrit 41.2 37.0 - 54.0 %    MCV 93.2 80.0 - 99.9 fL    MCH 31.2 26.0 - 35.0 pg    MCHC 33.5 32.0 - 34.5 %    RDW 13.3 11.5 - 15.0 fL    Platelets 700 117 - 004 E9/L    MPV 8.5 7.0 - 12.0 fL    Neutrophils % 67.8 43.0 - 80.0 % Immature Granulocytes % 0.2 0.0 - 5.0 %    Lymphocytes % 23.8 20.0 - 42.0 %    Monocytes % 7.3 2.0 - 12.0 %    Eosinophils % 0.5 0.0 - 6.0 %    Basophils % 0.4 0.0 - 2.0 %    Neutrophils # 8.17 (H) 1.80 - 7.30 E9/L    Immature Granulocytes # 0.02 E9/L    Lymphocytes # 2.86 1.50 - 4.00 E9/L    Monocytes # 0.88 0.10 - 0.95 E9/L    Eosinophils # 0.06 0.05 - 0.50 E9/L    Basophils # 0.05 0.00 - 0.20 E9/L   Comprehensive Metabolic Panel   Result Value Ref Range    Sodium 140 132 - 146 mmol/L    Potassium 4.8 3.5 - 5.0 mmol/L    Chloride 100 98 - 107 mmol/L    CO2 29 22 - 29 mmol/L    Anion Gap 11 7 - 16 mmol/L    Glucose 97 74 - 99 mg/dL    BUN 6 6 - 20 mg/dL    CREATININE 0.8 0.7 - 1.2 mg/dL    GFR Non-African American >60 >=60 mL/min/1.73    GFR African American >60     Calcium 8.9 8.6 - 10.2 mg/dL    Total Protein 7.2 6.4 - 8.3 g/dL    Alb 4.0 3.5 - 5.2 g/dL    Total Bilirubin <0.2 0.0 - 1.2 mg/dL    Alkaline Phosphatase 84 40 - 129 U/L    ALT 9 0 - 40 U/L    AST 13 0 - 39 U/L   Troponin   Result Value Ref Range    Troponin <0.01 0.00 - 0.03 ng/mL   Lactic Acid, Plasma   Result Value Ref Range    Lactic Acid 1.3 0.5 - 2.2 mmol/L   Urinalysis   Result Value Ref Range    Color, UA Yellow Straw/Yellow    Clarity, UA Clear Clear    Glucose, Ur Negative Negative mg/dL    Bilirubin Urine Negative Negative    Ketones, Urine Negative Negative mg/dL    Specific Gravity, UA <=1.005 1.005 - 1.030    Blood, Urine Negative Negative    pH, UA 6.0 5.0 - 9.0    Protein, UA Negative Negative mg/dL    Urobilinogen, Urine 0.2 <2.0 E.U./dL    Nitrite, Urine Negative Negative    Leukocyte Esterase, Urine Negative Negative   D-Dimer, Quantitative   Result Value Ref Range    D-Dimer, Quant 444 ng/mL DDU   EKG 12 Lead   Result Value Ref Range    Ventricular Rate 75 BPM    Atrial Rate 75 BPM    P-R Interval 116 ms    QRS Duration 90 ms    Q-T Interval 372 ms    QTc Calculation (Bazett) 415 ms    P Axis 78 degrees    R Axis 86 degrees    T Axis 73 degrees       Radiology:  CTA CHEST W CONTRAST   Final Result   There are no acute findings. There is no evidence for pulmonary embolic    disease. This report has been electronically signed by Kimi Suarez MD.      XR CHEST STANDARD (2 VW)   Final Result   No radiographic evidence of acute cardiopulmonary disease. .          ------------------------- NURSING NOTES AND VITALS REVIEWED ---------------------------  Date / Time Roomed:  4/1/2019 10:11 PM  ED Bed Assignment:  ELPIDIO/ELPIDIO    The nursing notes within the ED encounter and vital signs as below have been reviewed. BP (!) 162/70   Pulse 78   Temp 98 °F (36.7 °C)   Resp 18   Ht 5' 10\" (1.778 m)   Wt 142 lb (64.4 kg)   SpO2 100%   BMI 20.37 kg/m²   Oxygen Saturation Interpretation: Normal      ------------------------------------------ PROGRESS NOTES ------------------------------------------  ED Course as of Apr 02 0223   Mon Apr 01, 2019   2336 Patient states he still feels a burning in his chest. Will obtain a D dimer at this time. Patient receiving IV fluids, just finishing his duoneb treatmetn.     [KS]   Tue Apr 02, 2019   0016 D dimer elevated to 444. Will send over for CTA. [KS]   4552 Discussed with patient findings and he would like to sign out ama. [KS]   1757 Patient continuing to have chest pain, but patient is not willing to stay for repeat troponin and has decided to leave AMA. [KS]      ED Course User Index  [KS] Mateo Sorto DO         2:15 AM  I have spoken with the patient and discussed todays results, in addition to providing specific details for the plan of care and counseling regarding the diagnosis and prognosis. Their questions are answered at this time and they are agreeable with the plan. I discussed at length with them reasons for immediate return here for re evaluation. They will followup with their primary care physician by calling their office on Monday.     New Prescriptions    ALBUTEROL SULFATE  (90 BASE) MCG/ACT INHALER    Inhale 2 puffs into the lungs 4 times daily as needed for Wheezing    BENZONATATE (TESSALON PERLES) 100 MG CAPSULE    Take 1 capsule by mouth 3 times daily as needed for Cough    PREDNISONE (DELTASONE) 20 MG TABLET    Take 2 tablets by mouth daily for 5 days       Diagnosis:  1. Chest pain, unspecified type    2. Cough    3. Bronchitis        Disposition:  Patient's disposition: AMA  Patient's condition is stable. MDM  Number of Diagnoses or Management Options  Bronchitis:   Chest pain, unspecified type:   Cough:   Diagnosis management comments: Patient is a 58-year-old male who presented to the ED with chest pain. Patient was evaluated, d-dimer was elevated, CTA scan was then performed. CTA did not show any evidence of acute pulmonary embolism. Lab work was otherwise unremarkable, he was resting comfortably in no acute distress. Patient was given IV fluids as well as breathing treatments, which did not improve his chest pain. Patient's chest pain may be related to a cardiac condition that cannot be explained at this time. Offered the patient to stay for for a repeat troponin to ensure that symptoms are not cardiac related, but patient refused at this time. Patient discussed the risks of leaving against medical advice including and up to death. Patient was discussed the findings, and was in agreement with plan at this time. He was instructed to follow-up with his PCP. Patient most likely has acute bronchitis, related to a acute viral infection, may be related to influenza although was negative today. Patient will be discharged at this time, and he was advised precautions on coming back to the ED if his symptoms worsen. This patient has chosen to leave against medical advice. I have personally explained to them that choosing to do so may result in permanent bodily harm or death.   I discussed at length that without further evaluation and monitoring there may be unforeseen circumstances and deterioration resulting in permanent bodily harm or death as a result of their choice. They are alert, oriented, and competent at this time. They state that they are aware of the serious risks as explained, but they continue to wish to leave against medical   advice. In light of their decision to leave against medical advice, follow-up has been arranged and they are aware of the importance of following up as instructed. They have been advised that they should return to the ED immediately if they change their mind at any time, or if their condition begins to change or worsen. Amount and/or Complexity of Data Reviewed  Clinical lab tests: ordered and reviewed  Tests in the radiology section of CPT®: ordered and reviewed        ED Course as of Apr 02 0223   Mon Apr 01, 2019   2336 Patient states he still feels a burning in his chest. Will obtain a D dimer at this time. Patient receiving IV fluids, just finishing his duoneb treatmetn.     [KS]   Tue Apr 02, 2019   0016 D dimer elevated to 444. Will send over for CTA. [KS]   9503 Discussed with patient findings and he would like to sign out ama. [KS]   3265 Patient continuing to have chest pain, but patient is not willing to stay for repeat troponin and has decided to leave AMA. [KS]      ED Course User Index  [KS] Jazlyn Jama DO       Labs      Radiology      EKG Interpretation. Jazlyn Jama DO  Resident  04/02/19 7837  ATTENDING PROVIDER ATTESTATION:     I have personally performed and/or participated in the history, exam, medical decision making, and procedures and agree with all pertinent clinical information. I have also reviewed and agree with the past medical, family and social history unless otherwise noted. I have discussed this patient in detail with the resident, and provided the instruction and education regarding chest pain.     My findings/Plan: Patient presents because of chest pain and reports he has been coughing. Patient coughing for 5 days and reports productive sputum. Today he developed chest tightness and reports no radiation. Patient reporting no  Abdominal pain or fever patient awake alert mild distress heart and lung exam is normal abdomen soft nontender. There is no edema patient medicated and still reporting  Chest pain. D-dimer was done due to continued chest pain and  Complaint of shortness of breath. CT was ordered and revealed no PE patient still having  Pain and chest repeat troponin was to be ordered but patient does not want to wait any longer patient made aware of risks and again   Any further workup patient will sign out and  Will follow up outpatient he is to return  at any time    This patient has chosen to leave against medical advice. I have personally explained to them that choosing to do so may result in permanent bodily harm or death. I discussed at length that without further evaluation and monitoring there may be unforeseen circumstances and deterioration resulting in permanent bodily harm or death as a result of their choice. They are alert, oriented, and competent at this time. They state that they are aware of the serious risks as explained, but they continue to wish to leave against medical advice. In light of their decision to leave against medical advice, follow-up has been arranged and they are aware of the importance of following up as instructed. They have been advised that they should return to the ED immediately if they change their mind at any time, or if their condition begins to change or worsen.             Gisell Weiss MD  04/02/19 Baldev Vogt MD  04/02/19 8510

## 2019-04-09 ENCOUNTER — PREP FOR PROCEDURE (OUTPATIENT)
Dept: PHYSICAL MEDICINE AND REHAB | Age: 54
End: 2019-04-09

## 2019-04-11 ENCOUNTER — HOSPITAL ENCOUNTER (OUTPATIENT)
Dept: OPERATING ROOM | Age: 54
Setting detail: OUTPATIENT SURGERY
Discharge: HOME | End: 2019-04-11
Attending: PHYSICAL MEDICINE & REHABILITATION
Payer: MEDICAID

## 2019-04-11 ENCOUNTER — HOSPITAL ENCOUNTER (OUTPATIENT)
Age: 54
Setting detail: OUTPATIENT SURGERY
Discharge: HOME | End: 2019-04-11
Attending: PHYSICAL MEDICINE & REHABILITATION | Admitting: PHYSICAL MEDICINE & REHABILITATION
Payer: MEDICAID

## 2019-04-11 VITALS
RESPIRATION RATE: 16 BRPM | HEART RATE: 74 BPM | SYSTOLIC BLOOD PRESSURE: 112 MMHG | OXYGEN SATURATION: 99 % | DIASTOLIC BLOOD PRESSURE: 85 MMHG

## 2019-04-11 DIAGNOSIS — M47.896 OTHER OSTEOARTHRITIS OF SPINE, LUMBAR REGION: ICD-10-CM

## 2019-04-11 PROBLEM — M47.816 LUMBAR SPONDYLOSIS: Status: ACTIVE | Noted: 2019-04-11

## 2019-04-11 PROCEDURE — 7100000011 HC PHASE II RECOVERY - ADDTL 15 MIN: Performed by: PHYSICAL MEDICINE & REHABILITATION

## 2019-04-11 PROCEDURE — 64493 INJ PARAVERT F JNT L/S 1 LEV: CPT | Performed by: PHYSICAL MEDICINE & REHABILITATION

## 2019-04-11 PROCEDURE — 6360000002 HC RX W HCPCS: Performed by: PHYSICAL MEDICINE & REHABILITATION

## 2019-04-11 PROCEDURE — 7100000010 HC PHASE II RECOVERY - FIRST 15 MIN: Performed by: PHYSICAL MEDICINE & REHABILITATION

## 2019-04-11 PROCEDURE — 3600000005 HC SURGERY LEVEL 5 BASE: Performed by: PHYSICAL MEDICINE & REHABILITATION

## 2019-04-11 PROCEDURE — 2709999900 HC NON-CHARGEABLE SUPPLY: Performed by: PHYSICAL MEDICINE & REHABILITATION

## 2019-04-11 PROCEDURE — 64494 INJ PARAVERT F JNT L/S 2 LEV: CPT | Performed by: PHYSICAL MEDICINE & REHABILITATION

## 2019-04-11 PROCEDURE — 2500000003 HC RX 250 WO HCPCS: Performed by: PHYSICAL MEDICINE & REHABILITATION

## 2019-04-11 PROCEDURE — 3209999900 FLUORO FOR SURGICAL PROCEDURES

## 2019-04-11 RX ORDER — LIDOCAINE HYDROCHLORIDE 10 MG/ML
INJECTION, SOLUTION EPIDURAL; INFILTRATION; INTRACAUDAL; PERINEURAL PRN
Status: DISCONTINUED | OUTPATIENT
Start: 2019-04-11 | End: 2019-04-11 | Stop reason: ALTCHOICE

## 2019-04-11 ASSESSMENT — PAIN DESCRIPTION - PAIN TYPE: TYPE: ACUTE PAIN

## 2019-04-11 ASSESSMENT — PAIN DESCRIPTION - DESCRIPTORS
DESCRIPTORS: PRESSURE
DESCRIPTORS: ACHING;BURNING;NAGGING

## 2019-04-11 ASSESSMENT — PAIN DESCRIPTION - FREQUENCY: FREQUENCY: CONTINUOUS

## 2019-04-11 ASSESSMENT — PAIN - FUNCTIONAL ASSESSMENT: PAIN_FUNCTIONAL_ASSESSMENT: 0-10

## 2019-04-11 ASSESSMENT — PAIN SCALES - GENERAL: PAINLEVEL_OUTOF10: 0

## 2019-04-11 ASSESSMENT — PAIN DESCRIPTION - LOCATION: LOCATION: BACK

## 2019-04-11 NOTE — OP NOTE
LUMBAR FACET JOINT INTRA-ARTICULAR INJECTION(S)      WITH FLUOROSCOPIC GUIDANCE     (Zygopophyseal Joint Injection)      Patient: Nabeel Kiser                                                    MRN: [id number]  : 1965                                              Date of procedure: 2019    Physician Performing Procedure: Lia Alcantara DO     Clinical Scenario: As per our office notes. Diagnosis: facet arthropathy     Injectate: A total of 4cc, consisting of 1cc of Kenalog (40mg/cc), the remainder comprised of 2% lidocaine without epinephrine. Levels Treated: bilateral L3-4, L4-5 Facet Joints    Approach: Posterior oblique     Improvement after today's procedure: As per nursing record. Comments: None    Procedure: The patient was prepped and draped in a sterile fashion in the prone position after informed consent was signed and all patient questions were answered including the risks, benefits, alternative treatment options, and prognosis. The risks include but are not limited to infection, allergic reaction, nerve damage, paralysis, epidural hematoma, syncope, headache, pneumothorax, respiratory or cardiac arrest, and scar formation. The region overlying the above mentioned facet joints was localized under fluoroscopic visualization. A 3-4 cc. volume of 1% Lidocaine without Epinephrine was injected for local anesthesia. A 22 gauge, 3.5 inch spinal needle was inserted into each of the above mentioned facet joints. One cc of the steroid/anesthetic solution was then injected into each of the facet joints noted above. The patient tolerated the procedure well and was discharged after an appropriate period of observation. If there are any complications, the patient was instructed to call us. The patient is to folIow-up with the requesting physician within 4-6 weeks.

## 2019-04-15 ENCOUNTER — TELEPHONE (OUTPATIENT)
Dept: PHYSICAL MEDICINE AND REHAB | Age: 54
End: 2019-04-15

## 2019-04-15 NOTE — TELEPHONE ENCOUNTER
Patient called to make follow up epidural injection appointment. He is scheduled for 4-30-19. He states that the injection worked great. He also would like to know if you will see him for his knees. If so would you like him scheduled as an established patient new problem? Please advise.

## 2019-04-16 NOTE — TELEPHONE ENCOUNTER
Called patient to schedule him for knee pain. He did not answer, left message. Will await call back.

## 2019-04-30 ENCOUNTER — OFFICE VISIT (OUTPATIENT)
Dept: PHYSICAL MEDICINE AND REHAB | Age: 54
End: 2019-04-30
Payer: MEDICAID

## 2019-04-30 VITALS
DIASTOLIC BLOOD PRESSURE: 75 MMHG | BODY MASS INDEX: 19.76 KG/M2 | HEIGHT: 70 IN | SYSTOLIC BLOOD PRESSURE: 115 MMHG | WEIGHT: 138 LBS | HEART RATE: 70 BPM

## 2019-04-30 DIAGNOSIS — M25.40 ARTHROPATHY OF SPINAL FACET JOINT CONCURRENT WITH AND DUE TO EFFUSION: ICD-10-CM

## 2019-04-30 DIAGNOSIS — G89.29 CHRONIC BILATERAL LOW BACK PAIN WITHOUT SCIATICA: ICD-10-CM

## 2019-04-30 DIAGNOSIS — M47.816 LUMBAR SPONDYLOSIS: Primary | ICD-10-CM

## 2019-04-30 DIAGNOSIS — M17.0 PRIMARY OSTEOARTHRITIS OF BOTH KNEES: ICD-10-CM

## 2019-04-30 DIAGNOSIS — M47.819 ARTHROPATHY OF SPINAL FACET JOINT CONCURRENT WITH AND DUE TO EFFUSION: ICD-10-CM

## 2019-04-30 DIAGNOSIS — M54.50 CHRONIC BILATERAL LOW BACK PAIN WITHOUT SCIATICA: ICD-10-CM

## 2019-04-30 PROCEDURE — G8427 DOCREV CUR MEDS BY ELIG CLIN: HCPCS | Performed by: PHYSICAL MEDICINE & REHABILITATION

## 2019-04-30 PROCEDURE — 4004F PT TOBACCO SCREEN RCVD TLK: CPT | Performed by: PHYSICAL MEDICINE & REHABILITATION

## 2019-04-30 PROCEDURE — 99214 OFFICE O/P EST MOD 30 MIN: CPT | Performed by: PHYSICAL MEDICINE & REHABILITATION

## 2019-04-30 PROCEDURE — 3017F COLORECTAL CA SCREEN DOC REV: CPT | Performed by: PHYSICAL MEDICINE & REHABILITATION

## 2019-04-30 PROCEDURE — G8420 CALC BMI NORM PARAMETERS: HCPCS | Performed by: PHYSICAL MEDICINE & REHABILITATION

## 2019-04-30 NOTE — PROGRESS NOTES
Anu Villa D.O. [place] [facility]  [address] [facility] [place]  Phone: [phone]  Fax: [phone]        4/30/19    Chief Complaint   Patient presents with    Back Pain     follow up after pablo       HPI:  Ashley Marie is a 48y.o. year old man seen today in follow up regarding low back pain. Interval history: Since the last visit the patient had L3-4, L4-5 facet injections on 4/11/19 and he had 70% improvement until this week. He states he increased his activity level. Today, the pain is rated Pain Score:   5 where 0 is no pain and 10 is pain as bad as it can be. The pain is located in the low back,  does not radiate, and is described as aching. This pain occurs intermittently. The symptoms have been better since onset. Symptoms are exacerbated by walking. Factors which relieve the pain include . Other facet joint injection associated symptoms include none. Otherwise, the pain assessment has not changed since the last visit.      Past Medical History:   Diagnosis Date    Osteoarthritis     Bilateral Knee, Back        Past Surgical History:   Procedure Laterality Date    ANESTHESIA NERVE BLOCK Bilateral 4/11/2019    BILATERAL INTRA-ARTICULAR FACET JOINT INJECTION WITH FLUOROSCOPIC GUIDANCE AT L3-4 AND L4-5 performed by Nelson Dasilva DO at [address] [facility] Left     Elbow     NERVE BLOCK Bilateral 04/11/2019       Social History     Tobacco Use    Smoking status: Current Every Day Smoker     Packs/day: 1.00     Types: Cigarettes     Start date: 1/1/1983    Smokeless tobacco: Never Used    Tobacco comment: Waiting until winter    Substance Use Topics    Alcohol use: No     Comment: Recovering alcoholic 25/76/6414    Drug use: No       Family History   Problem Relation Age of Onset    Stroke Mother     COPD Mother     Diabetes Mother        Current Outpatient Medications   Medication Sig Dispense Refill    albuterol sulfate  (90 Base) MCG/ACT inhaler Inhale 2 puffs into the lungs 4 times daily as needed for Wheezing 3 Inhaler 0    ibuprofen (ADVIL;MOTRIN) 800 MG tablet take 1 tablet by mouth every 8 hours if needed for pain 90 tablet 0    Tens Unit MISC by Does not apply route 1 each 0    HYDROcodone-acetaminophen (NORCO)  MG per tablet take 1 tablet by mouth every 8 hours if needed for pain  0    cyclobenzaprine (FLEXERIL) 10 MG tablet Take 10 mg by mouth 3 times daily as needed for Muscle spasms      nicotine (NICODERM CQ) 21 MG/24HR Place 1 patch onto the skin every 24 hours 30 patch 1    amitriptyline (ELAVIL) 25 MG tablet Take 2 tablets by mouth nightly 30 tablet 2     No current facility-administered medications for this visit. No Known Allergies    Review of Systems:  No new weakness, paresthesia, incontinence of bowel or bladder, saddle anesthesia, falls or gait dysfunction. Otherwise, per HPI. Physical Exam:   Blood pressure 115/75, pulse 70, height 5' 10\" (1.778 m), weight 138 lb (62.6 kg). GENERAL: The patient is in no apparent distress. Body habitus is obese. HEENT: No rhinorrhea, sneezing, yawning, or lacrimation. No scleral icterus or conjunctival injection. SKIN: No piloerection. No tract marks. No rash. PSYCH: Mood and affect are appropriate. Hygiene is appropriate. CARDIOVASCULAR  Heart is regular rate and rhythm. There is no edema. RESPIRATORY: Respirations are regular and unlabored. There is no cyanosis. GASTROINTESTINAL: Soft abdomen, non-tender. MSK: There is no joint effusion, deformity, instability, swelling, erythema or warmth. AROM is full in the spine and extremities. Spinal curvatures are normal.   Bilateral knee joint tenderness. Bilateral lumbar paraspinal tenderness. NEURO: Gait is normal. No focal sensorimotor deficit. Reflexes 2+ and symmetric in lower extremities. Impression:   1. Lumbar spondylosis    2.  Chronic bilateral low back pain without

## 2019-04-30 NOTE — PATIENT INSTRUCTIONS
Patient Education        Learning About Medial Branch Block and Neurotomy  What are medial branch block and neurotomy? Facet joints connect your vertebrae to each other. Problems in these joints can cause chronic (long-term) pain in the neck or back. They can sometimes affect the shoulders, arms, buttocks, or legs. Medial branch nerves are the nerves that carry many of the pain messages from your facet joints. Radiofrequency medial branch neurotomy is a type of medial branch neurotomy that is used to relieve arthritis pain. It uses radio waves to damage nerves in your neck or back so that they can no longer send pain messages to your brain. Before your doctor knows if a neurotomy will help you, he or she will do a medial branch block to find out if certain nerves are the ones that are a source of your pain. You will need two separate visits to the outpatient center or hospital to have both procedures. How is a medial branch block done? The doctor will use a tiny needle to numb the skin where you will get the block. Then he or she puts the block needle into the numbed area. You may feel some pressure, but you should not feel pain. Using fluoroscopy (live X-ray) to guide the needle, the doctor injects medicine onto one or more nerves to make them numb. If you get relief from your pain in the next 4 to 6 hours, it's a sign that those nerves may be contributing to your pain. The relief will last only a short time. You may then have a medial branch neurotomy at a later visit to try to get longer relief. It takes 20 to 30 minutes to get the block. You can go home after the doctor watches you for about an hour. You will get instructions on how to report how much pain you have when you are at home. You will need someone to drive you home. How is medial branch neurotomy done? The doctor will use a tiny needle to numb the skin where you will get the neurotomy.  Then he or she puts the neurotomy needle into the numbed area. You may feel some pressure. Using fluoroscopy (live X-ray) to guide the needle, the doctor sends radio waves through the needle to the nerve for 60 to 90 seconds. The radio waves heat the nerve, which damages it. The doctor may do this several times. And he or she may treat more than one nerve. It takes 45 to 90 minutes to get a neurotomy, depending on how many nerves are heated. You will probably go home 30 to 60 minutes later. You will need someone to drive you home. What can you expect after a neurotomy? You may feel a little sore or tender at the injection site at first. But after a successful neurotomy, most people have pain relief right away. It often lasts for 9 to 12 months or longer. Sometimes the pain relief is permanent. If your pain does come back, it may mean that the damaged nerve has healed and can send pain messages again. Or it can mean that a different nerve is causing pain. Your doctor will discuss your options with you. Follow-up care is a key part of your treatment and safety. Be sure to make and go to all appointments, and call your doctor if you are having problems. It's also a good idea to know your test results and keep a list of the medicines you take. Where can you learn more? Go to https://[employer].sourceasy. org and sign in to your [employer] account. Enter M408 in the [employer] box to learn more about \"Learning About Medial Branch Block and Neurotomy. \"     If you do not have an account, please click on the \"Sign Up Now\" link. Current as of: Venita 3, 2018  Content Version: 11.9  © 2546-7188 [employer]. Care instructions adapted under license by [facility] ([facility]). If you have questions about a medical condition or this instruction, always ask your healthcare professional. [address] any warranty or liability for your use of this information.

## 2019-05-06 ENCOUNTER — OFFICE VISIT (OUTPATIENT)
Dept: PHYSICAL MEDICINE AND REHAB | Age: 54
End: 2019-05-06
Payer: MEDICAID

## 2019-05-06 VITALS
SYSTOLIC BLOOD PRESSURE: 116 MMHG | WEIGHT: 138 LBS | HEART RATE: 88 BPM | TEMPERATURE: 98.9 F | BODY MASS INDEX: 19.76 KG/M2 | HEIGHT: 70 IN | DIASTOLIC BLOOD PRESSURE: 70 MMHG

## 2019-05-06 DIAGNOSIS — M17.10 ARTHRITIS OF KNEE: ICD-10-CM

## 2019-05-06 DIAGNOSIS — M79.18 BUTTOCK PAIN: Primary | ICD-10-CM

## 2019-05-06 PROCEDURE — 20611 DRAIN/INJ JOINT/BURSA W/US: CPT | Performed by: PHYSICAL MEDICINE & REHABILITATION

## 2019-05-06 PROCEDURE — 99999 PR OFFICE/OUTPT VISIT,PROCEDURE ONLY: CPT | Performed by: PHYSICAL MEDICINE & REHABILITATION

## 2019-05-06 RX ORDER — TRIAMCINOLONE ACETONIDE 40 MG/ML
40 INJECTION, SUSPENSION INTRA-ARTICULAR; INTRAMUSCULAR ONCE
Status: COMPLETED | OUTPATIENT
Start: 2019-05-06 | End: 2019-05-06

## 2019-05-06 RX ORDER — LIDOCAINE HYDROCHLORIDE 10 MG/ML
7 INJECTION, SOLUTION INFILTRATION; PERINEURAL ONCE
Status: COMPLETED | OUTPATIENT
Start: 2019-05-06 | End: 2019-05-06

## 2019-05-06 RX ADMIN — TRIAMCINOLONE ACETONIDE 40 MG: 40 INJECTION, SUSPENSION INTRA-ARTICULAR; INTRAMUSCULAR at 14:28

## 2019-05-06 RX ADMIN — LIDOCAINE HYDROCHLORIDE 7 ML: 10 INJECTION, SOLUTION INFILTRATION; PERINEURAL at 14:29

## 2019-05-06 NOTE — PROGRESS NOTES
Rudi Bailey D.O. [place] [facility]  [address] [facility] [facility] [address] [place]  Phone: [phone]  Fax: [phone]    5/6/2019    Chief Complaint   Patient presents with    Injections     Right Knee Injeciton       Last injection: n/a  Taking anticoagulants/antiplatelets: No  Diabetic: No  Febrile/active infection: No    After explaining the indications, risks, benefits and alternatives of a right knee joint injection, the patient agreed to proceed. A permit was signed and scanned into the chart. The skin on the lateral knee was prepared with chloraprep. Using sterile technique, a 22 gauge, 1.5\" needle with 1 cc of Kenalog 40mg/cc and 8 cc of 1% lidocaine was directed to the knee joint using US guidance. After negative aspiration, the medication was injected. Adequate hemostasis was achieved and a bandage applied. The patient tolerated the procedure well and was educated in post injection care. The patient was clinically monitored after the injection and left the office without incident. There was post injection reduction in pain. Ultrasound images are scanned separately into the EMR. Rudi Bailey D.O., P.T.   Board Certified Physical Medicine and Rehabilitation  Board Certified Electrodiagnostic Medicine    Administrations This Visit     lidocaine 1 % injection 7 mL     Admin Date  05/06/2019  14:29 Action  Given by Other Dose  7 mL Route  Other Site  Knee Right Administered By  Say Ramires RN    Ordering Provider:  Dmitry Serna DO    NDC:  5467-8772-91    Lot#:  [id number] exp 8/2020    :  Jamel Dodge    Patient Supplied?:  No    Comments:  per Dr Charity Leal, DO          triamcinolone acetonide (KENALOG-40) injection 40 mg     Admin Date  05/06/2019  14:28 Action  Given by Other Dose  40 mg Route  Intra-articular Site  Knee Right Administered By  Say Ramires RN    Ordering Provider:  Dmitry Serna DO ND:  6411-2313-89    Lot#: [id number] exp 4/2020    :  [facility] ([facility])    Patient Supplied?:  No    Comments:  per Dr Elmer Li, DO

## 2019-05-14 ENCOUNTER — OFFICE VISIT (OUTPATIENT)
Dept: PHYSICAL MEDICINE AND REHAB | Age: 54
End: 2019-05-14
Payer: MEDICAID

## 2019-05-14 VITALS
BODY MASS INDEX: 19.04 KG/M2 | SYSTOLIC BLOOD PRESSURE: 127 MMHG | TEMPERATURE: 98.1 F | WEIGHT: 133 LBS | HEART RATE: 89 BPM | HEIGHT: 70 IN | DIASTOLIC BLOOD PRESSURE: 75 MMHG

## 2019-05-14 DIAGNOSIS — M17.10 ARTHRITIS OF KNEE: Primary | ICD-10-CM

## 2019-05-14 PROCEDURE — 20611 DRAIN/INJ JOINT/BURSA W/US: CPT | Performed by: PHYSICAL MEDICINE & REHABILITATION

## 2019-05-14 PROCEDURE — 99999 PR OFFICE/OUTPT VISIT,PROCEDURE ONLY: CPT | Performed by: PHYSICAL MEDICINE & REHABILITATION

## 2019-05-14 RX ORDER — LIDOCAINE HYDROCHLORIDE 10 MG/ML
7 INJECTION, SOLUTION INFILTRATION; PERINEURAL ONCE
Status: COMPLETED | OUTPATIENT
Start: 2019-05-14 | End: 2019-05-14

## 2019-05-14 RX ORDER — TRIAMCINOLONE ACETONIDE 40 MG/ML
40 INJECTION, SUSPENSION INTRA-ARTICULAR; INTRAMUSCULAR ONCE
Status: COMPLETED | OUTPATIENT
Start: 2019-05-14 | End: 2019-05-14

## 2019-05-14 RX ADMIN — LIDOCAINE HYDROCHLORIDE 7 ML: 10 INJECTION, SOLUTION INFILTRATION; PERINEURAL at 13:52

## 2019-05-14 RX ADMIN — TRIAMCINOLONE ACETONIDE 40 MG: 40 INJECTION, SUSPENSION INTRA-ARTICULAR; INTRAMUSCULAR at 13:53

## 2019-05-14 NOTE — PROGRESS NOTES
Nica Ferreira D.O. [place] [facility]  [address] [facility] [facility] [place]  Phone: [phone]  Fax: [phone]    5/14/2019    Chief Complaint   Patient presents with    Knee Pain     LEFT KNEE INJECTION       Last injection: n/a  Taking anticoagulants/antiplatelets: No  Diabetic: No  Febrile/active infection: No    After explaining the indications, risks, benefits and alternatives of a left knee joint injection, the patient agreed to proceed. A permit was signed and scanned into the chart. The skin on the lateral knee was prepared with chloraprep. Using sterile technique, a 22 gauge, 1.5\" needle with 1 cc of Kenalog 40mg/cc and 8 cc of 1% lidocaine was directed to the knee joint using US guidance. After negative aspiration, the medication was injected. Adequate hemostasis was achieved and a bandage applied. The patient tolerated the procedure well and was educated in post injection care. The patient was clinically monitored after the injection and left the office without incident. There was post injection reduction in pain. Ultrasound images are scanned separately into the EMR. Nica Ferreira D.O., P.T.   Board Certified Physical Medicine and Rehabilitation  Board Certified Electrodiagnostic Medicine    Administrations This Visit     lidocaine 1 % injection 7 mL     Admin Date  05/14/2019  13:52 Action  Given Dose  7 mL Route  Other Site   Administered By  Julieta Phillip MA    Ordering Provider:  Rajinder Pruett DO    NDC:  4309-9423-73    Lot#:  [id number]    :  [facility] [place]    Patient Supplied?:  No    Comments:  EXP:  08/2020          triamcinolone acetonide (KENALOG-40) injection 40 mg     Admin Date  05/14/2019  13:53 Action  Given Dose  40 mg Route  Intra-articular Site   Administered By  Julieta Phillip MA    Ordering Provider:  Rajinder Pruett DO    NDC:  6466-7235-04    Lot#:  [id number]    :  [facility] (PRIMARY [facility])    Patient Supplied?:  No    Comments:  EXP: APR 2020

## 2019-05-22 RX ORDER — IBUPROFEN 800 MG/1
TABLET ORAL
Qty: 90 TABLET | Refills: 0 | Status: SHIPPED | OUTPATIENT
Start: 2019-05-22 | End: 2019-08-14 | Stop reason: SDUPTHER

## 2019-06-19 ENCOUNTER — TELEPHONE (OUTPATIENT)
Dept: PHYSICAL MEDICINE AND REHAB | Age: 54
End: 2019-06-19

## 2019-06-19 NOTE — TELEPHONE ENCOUNTER
Called patient to follow up on knee injections. He states that the left one was about 50% effective and the right one was about 40% effective with no side effects. He states that the knee braces are really helping with his knees and backs. He says that back brace helps some when standing but is more of a hassle because he has to take it off when sitting. Patient is scheduled for a follow up appointment July 17th.

## 2019-07-17 ENCOUNTER — OFFICE VISIT (OUTPATIENT)
Dept: PHYSICAL MEDICINE AND REHAB | Age: 54
End: 2019-07-17
Payer: MEDICAID

## 2019-07-17 VITALS
DIASTOLIC BLOOD PRESSURE: 72 MMHG | WEIGHT: 133 LBS | HEIGHT: 70 IN | SYSTOLIC BLOOD PRESSURE: 118 MMHG | BODY MASS INDEX: 19.04 KG/M2 | HEART RATE: 77 BPM

## 2019-07-17 DIAGNOSIS — M25.562 CHRONIC PAIN OF BOTH KNEES: ICD-10-CM

## 2019-07-17 DIAGNOSIS — M47.816 LUMBAR SPONDYLOSIS: Primary | ICD-10-CM

## 2019-07-17 DIAGNOSIS — M25.561 CHRONIC PAIN OF BOTH KNEES: ICD-10-CM

## 2019-07-17 DIAGNOSIS — M54.50 CHRONIC BILATERAL LOW BACK PAIN WITHOUT SCIATICA: ICD-10-CM

## 2019-07-17 DIAGNOSIS — G89.29 CHRONIC BILATERAL LOW BACK PAIN WITHOUT SCIATICA: ICD-10-CM

## 2019-07-17 DIAGNOSIS — G89.29 CHRONIC PAIN OF BOTH KNEES: ICD-10-CM

## 2019-07-17 DIAGNOSIS — M17.0 PRIMARY OSTEOARTHRITIS OF BOTH KNEES: ICD-10-CM

## 2019-07-17 PROCEDURE — G8420 CALC BMI NORM PARAMETERS: HCPCS | Performed by: PHYSICAL MEDICINE & REHABILITATION

## 2019-07-17 PROCEDURE — G8427 DOCREV CUR MEDS BY ELIG CLIN: HCPCS | Performed by: PHYSICAL MEDICINE & REHABILITATION

## 2019-07-17 PROCEDURE — 99214 OFFICE O/P EST MOD 30 MIN: CPT | Performed by: PHYSICAL MEDICINE & REHABILITATION

## 2019-07-17 PROCEDURE — 3017F COLORECTAL CA SCREEN DOC REV: CPT | Performed by: PHYSICAL MEDICINE & REHABILITATION

## 2019-07-17 PROCEDURE — 4004F PT TOBACCO SCREEN RCVD TLK: CPT | Performed by: PHYSICAL MEDICINE & REHABILITATION

## 2019-07-17 RX ORDER — KETOROLAC TROMETHAMINE 15 MG/ML
15 INJECTION, SOLUTION INTRAMUSCULAR; INTRAVENOUS ONCE
Status: COMPLETED | OUTPATIENT
Start: 2019-07-17 | End: 2019-07-17

## 2019-07-17 RX ADMIN — KETOROLAC TROMETHAMINE 15 MG: 15 INJECTION, SOLUTION INTRAMUSCULAR; INTRAVENOUS at 12:21

## 2019-07-17 NOTE — PROGRESS NOTES
Ludlow BESS [place] [facility]  [address] [place]  Phone: [phone]  Fax: [phone]        7/17/19    Chief Complaint   Patient presents with    Back Pain     Follow up    Knee Pain       HPI:  Edgardo Morrissey is a 48y.o. year old man seen today in follow up regarding back. Interval history: Since the last visit the patient had 100% relief of back pain for 3 weeks after facet injections in April. He had only a few weeks of relief of the knee pain with the knee injections. Today, the pain is rated Pain Score:   9 where 0 is no pain and 10 is pain as bad as it can be. The pain is located in the low back and bilateral knees,  and is described as aching, stiff. This pain occurs all day. The symptoms have been unchanged since onset. Symptoms are exacerbated by walking. Factors which relieve the pain include nothing. Other associated symptoms include stiffness. Otherwise, the pain assessment has not changed since the last visit.      Past Medical History:   Diagnosis Date    Osteoarthritis     Bilateral Knee, Back        Past Surgical History:   Procedure Laterality Date    ANESTHESIA NERVE BLOCK Bilateral 4/11/2019    BILATERAL INTRA-ARTICULAR FACET JOINT INJECTION WITH FLUOROSCOPIC GUIDANCE AT L3-4 AND L4-5 performed by Dannie Gomez DO at [address] [facility] Left     Elbow     NERVE BLOCK Bilateral 04/11/2019       Social History     Tobacco Use    Smoking status: Current Every Day Smoker     Packs/day: 1.00     Types: Cigarettes     Start date: 1/1/1983    Smokeless tobacco: Never Used    Tobacco comment: Waiting until winter    Substance Use Topics    Alcohol use: No     Comment: Recovering alcoholic 51/23/3331    Drug use: No       Family History   Problem Relation Age of Onset    Stroke Mother     COPD Mother     Diabetes Mother        Current Outpatient Medications   Medication Sig Dispense Refill   

## 2019-07-25 ENCOUNTER — TELEPHONE (OUTPATIENT)
Dept: PHYSICAL MEDICINE AND REHAB | Age: 54
End: 2019-07-25

## 2019-07-30 NOTE — TELEPHONE ENCOUNTER
2 boxes of Euflexxa (3 syringes in each box)  were delivered to the office. I called the patient and left a message for him to call back to schedule his appointments. Needs to schedule for Bilateral Euflexxa injections, once a week for 3 weeks.

## 2019-07-31 ENCOUNTER — PREP FOR PROCEDURE (OUTPATIENT)
Dept: PHYSICAL MEDICINE AND REHAB | Age: 54
End: 2019-07-31

## 2019-08-01 ENCOUNTER — HOSPITAL ENCOUNTER (OUTPATIENT)
Age: 54
Setting detail: OUTPATIENT SURGERY
Discharge: HOME | End: 2019-08-01
Attending: PHYSICAL MEDICINE & REHABILITATION | Admitting: PHYSICAL MEDICINE & REHABILITATION
Payer: MEDICAID

## 2019-08-01 ENCOUNTER — HOSPITAL ENCOUNTER (OUTPATIENT)
Dept: OPERATING ROOM | Age: 54
Setting detail: OUTPATIENT SURGERY
Discharge: HOME | End: 2019-08-01
Attending: PHYSICAL MEDICINE & REHABILITATION
Payer: MEDICAID

## 2019-08-01 VITALS
RESPIRATION RATE: 16 BRPM | DIASTOLIC BLOOD PRESSURE: 88 MMHG | SYSTOLIC BLOOD PRESSURE: 134 MMHG | HEART RATE: 68 BPM | OXYGEN SATURATION: 97 %

## 2019-08-01 DIAGNOSIS — M47.816 OSTEOARTHRITIS OF FACET JOINT OF LUMBAR SPINE: ICD-10-CM

## 2019-08-01 PROCEDURE — 3600000005 HC SURGERY LEVEL 5 BASE: Performed by: PHYSICAL MEDICINE & REHABILITATION

## 2019-08-01 PROCEDURE — 7100000011 HC PHASE II RECOVERY - ADDTL 15 MIN: Performed by: PHYSICAL MEDICINE & REHABILITATION

## 2019-08-01 PROCEDURE — C1713 ANCHOR/SCREW BN/BN,TIS/BN: HCPCS | Performed by: PHYSICAL MEDICINE & REHABILITATION

## 2019-08-01 PROCEDURE — 64635 DESTROY LUMB/SAC FACET JNT: CPT | Performed by: PHYSICAL MEDICINE & REHABILITATION

## 2019-08-01 PROCEDURE — 7100000010 HC PHASE II RECOVERY - FIRST 15 MIN: Performed by: PHYSICAL MEDICINE & REHABILITATION

## 2019-08-01 PROCEDURE — 2709999900 HC NON-CHARGEABLE SUPPLY: Performed by: PHYSICAL MEDICINE & REHABILITATION

## 2019-08-01 PROCEDURE — 64636 DESTROY L/S FACET JNT ADDL: CPT | Performed by: PHYSICAL MEDICINE & REHABILITATION

## 2019-08-01 PROCEDURE — 2500000003 HC RX 250 WO HCPCS: Performed by: PHYSICAL MEDICINE & REHABILITATION

## 2019-08-01 PROCEDURE — 3209999900 FLUORO FOR SURGICAL PROCEDURES

## 2019-08-01 PROCEDURE — 3600000015 HC SURGERY LEVEL 5 ADDTL 15MIN: Performed by: PHYSICAL MEDICINE & REHABILITATION

## 2019-08-01 RX ORDER — LIDOCAINE HYDROCHLORIDE 20 MG/ML
INJECTION, SOLUTION EPIDURAL; INFILTRATION; INTRACAUDAL; PERINEURAL PRN
Status: DISCONTINUED | OUTPATIENT
Start: 2019-08-01 | End: 2019-08-01 | Stop reason: ALTCHOICE

## 2019-08-01 RX ORDER — LIDOCAINE HYDROCHLORIDE 10 MG/ML
INJECTION, SOLUTION EPIDURAL; INFILTRATION; INTRACAUDAL; PERINEURAL PRN
Status: DISCONTINUED | OUTPATIENT
Start: 2019-08-01 | End: 2019-08-01 | Stop reason: ALTCHOICE

## 2019-08-01 ASSESSMENT — PAIN SCALES - GENERAL
PAINLEVEL_OUTOF10: 0
PAINLEVEL_OUTOF10: 0

## 2019-08-01 ASSESSMENT — PAIN DESCRIPTION - DESCRIPTORS: DESCRIPTORS: DISCOMFORT;CRUSHING

## 2019-08-01 ASSESSMENT — PAIN - FUNCTIONAL ASSESSMENT: PAIN_FUNCTIONAL_ASSESSMENT: 0-10

## 2019-08-01 NOTE — H&P
Hilda Macdonald, [address] [facility]  [address] [facility] [facility] [address] [facility] [place]  Phone: [phone]  Fax: [phone]    PCP: Mikhail Davis DO  Date of visit: 8/1/2019    CC: low back pain       Yadira Gardiner is a 47 y.o. male who presents today for RFA. Patient complains of low back pain that does not radiate. No red flag symptoms. Consents to proceed with procedure. No Known Allergies    No current facility-administered medications for this encounter. Past Medical History:   Diagnosis Date    Osteoarthritis     Bilateral Knee, Back        Past Surgical History:   Procedure Laterality Date    ANESTHESIA NERVE BLOCK Bilateral 4/11/2019    BILATERAL INTRA-ARTICULAR FACET JOINT INJECTION WITH FLUOROSCOPIC GUIDANCE AT L3-4 AND L4-5 performed by Hilda Macdonald DO at [address] [facility] Left     Elbow     NERVE BLOCK Bilateral 04/11/2019       Family History   Problem Relation Age of Onset    Stroke Mother     COPD Mother     Diabetes Mother             ROS:    Constitutional: Denies fevers, chills, night sweats, unintentional weight loss     Skin: Denies rash or skin changes     Respiratory: Denies SOB or cough     Cardiovascular: Denies CP, palpitations, edema      Neurologic: See HPI.     MSK: See HPI. Hematologic/Lymphatic/Immunologic: Denies bruising       Physical Exam:   Blood pressure 117/72, pulse 80, resp. rate 20, SpO2 98 %. General: well developed and well nourished in no acute distress  Resp: symmetrical chest expansion, unlabored breathing, respirations unlabored. CV: Heart rate is regular. Peripheral pulses are palpable  Skin: No rashes or ecchymosis. Normal turgor. MSK: ttp lumbar psps     Neurological Exam:  No focal sensorimotor deficits. Reflexes 2+ and symmetric.        Impression:     Lumbar facet arthropathy      Plan:   · Injection as planned today           Hilda Macdonald

## 2019-08-14 ENCOUNTER — OFFICE VISIT (OUTPATIENT)
Dept: PHYSICAL MEDICINE AND REHAB | Age: 54
End: 2019-08-14
Payer: MEDICAID

## 2019-08-14 VITALS
HEART RATE: 76 BPM | BODY MASS INDEX: 18.61 KG/M2 | HEIGHT: 70 IN | WEIGHT: 130 LBS | TEMPERATURE: 98.2 F | SYSTOLIC BLOOD PRESSURE: 138 MMHG | DIASTOLIC BLOOD PRESSURE: 91 MMHG

## 2019-08-14 DIAGNOSIS — M17.0 PRIMARY OSTEOARTHRITIS OF BOTH KNEES: Primary | ICD-10-CM

## 2019-08-14 PROCEDURE — 20611 DRAIN/INJ JOINT/BURSA W/US: CPT | Performed by: PHYSICAL MEDICINE & REHABILITATION

## 2019-08-14 RX ORDER — HYALURONATE SODIUM 10 MG/ML
40 SYRINGE (ML) INTRAARTICULAR WEEKLY
Status: COMPLETED | OUTPATIENT
Start: 2019-08-14 | End: 2019-08-29

## 2019-08-14 RX ADMIN — Medication 40 MG: at 09:44

## 2019-08-23 ENCOUNTER — OFFICE VISIT (OUTPATIENT)
Dept: PHYSICAL MEDICINE AND REHAB | Age: 54
End: 2019-08-23
Payer: MEDICAID

## 2019-08-23 VITALS
TEMPERATURE: 97.9 F | WEIGHT: 136 LBS | BODY MASS INDEX: 19.47 KG/M2 | SYSTOLIC BLOOD PRESSURE: 130 MMHG | DIASTOLIC BLOOD PRESSURE: 88 MMHG | HEIGHT: 70 IN | HEART RATE: 77 BPM

## 2019-08-23 DIAGNOSIS — M17.0 PRIMARY OSTEOARTHRITIS OF BOTH KNEES: Primary | ICD-10-CM

## 2019-08-23 PROCEDURE — 20611 DRAIN/INJ JOINT/BURSA W/US: CPT | Performed by: PHYSICAL MEDICINE & REHABILITATION

## 2019-08-23 RX ADMIN — Medication 40 MG: at 11:11

## 2019-08-23 NOTE — PROGRESS NOTES
Gab Sykes D.O. [place] [facility]  [address] [facility] [place]  Phone: [phone]  Fax: [phone]    8/23/2019    Chief Complaint   Patient presents with    Knee Pain     bilateral Euflexxa #2 knee       Last injection: 8/14/19  Taking anticoagulants/antiplatelets: No  Diabetic: No  Febrile/active infection: No    After explaining the indications, risks, benefits and alternatives of a bilateral knee joint injection, the patient agreed to proceed. The patient was placed in the supine position with slight knee flexion using a pillow. Ethyl chloride vapocoolant spray was applied. . The skin on the medial knee was prepared with Chloraprep. Using ultrasound guidance and a no touch, aseptic technique, a 20 gauge, 1.5\" needle with 10 cc syringe was directed into  the knee joint deep to the medial retinaculum. After negative aspiration, the syringe was changed with needle left in place and 2 cc of Euflexxa  20mg/2cc was injected into the knee joint. The procedure was repeated on the contralateral side. The patient tolerated the procedure well and was educated in post injection care. Adequate hemostasis was achieved and a bandage applied to the injection sites. There was post injection reduction in pain. The patient was monitored clinically and left the office without incident. US images are uploaded separately into the electronic medical record. Gab Sykes D.O., P.T.   Board Certified Physical Medicine and Rehabilitation  Board Certified Electrodiagnostic Medicine    Administrations This Visit     sodium hyaluronate (viscosup) injection 40 mg     Admin Date  08/23/2019  11:11 Action  Given Dose  40 mg Route  Intra-articular Site   Administered By  Coy Carmona MA    Ordering Provider:  Rashel Posey DO    NDC:  [address]-1    Lot#:  [id number]    :  [facility]    Patient Supplied?:  No    Comments:  EXP:08/2020

## 2019-08-29 ENCOUNTER — OFFICE VISIT (OUTPATIENT)
Dept: PHYSICAL MEDICINE AND REHAB | Age: 54
End: 2019-08-29
Payer: MEDICAID

## 2019-08-29 VITALS
DIASTOLIC BLOOD PRESSURE: 70 MMHG | TEMPERATURE: 98.6 F | HEART RATE: 84 BPM | HEIGHT: 70 IN | WEIGHT: 136 LBS | SYSTOLIC BLOOD PRESSURE: 118 MMHG | BODY MASS INDEX: 19.47 KG/M2

## 2019-08-29 DIAGNOSIS — M47.816 LUMBAR SPONDYLOSIS: Primary | ICD-10-CM

## 2019-08-29 DIAGNOSIS — M17.0 PRIMARY OSTEOARTHRITIS OF BOTH KNEES: ICD-10-CM

## 2019-08-29 DIAGNOSIS — G89.29 CHRONIC BILATERAL LOW BACK PAIN WITHOUT SCIATICA: ICD-10-CM

## 2019-08-29 DIAGNOSIS — M54.50 CHRONIC BILATERAL LOW BACK PAIN WITHOUT SCIATICA: ICD-10-CM

## 2019-08-29 PROCEDURE — 20611 DRAIN/INJ JOINT/BURSA W/US: CPT | Performed by: PHYSICAL MEDICINE & REHABILITATION

## 2019-08-29 PROCEDURE — 99999 PR OFFICE/OUTPT VISIT,PROCEDURE ONLY: CPT | Performed by: PHYSICAL MEDICINE & REHABILITATION

## 2019-08-29 RX ADMIN — Medication 40 MG: at 11:19

## 2019-09-16 ENCOUNTER — TELEPHONE (OUTPATIENT)
Dept: PHYSICAL MEDICINE AND REHAB | Age: 54
End: 2019-09-16

## 2019-09-26 ENCOUNTER — TELEPHONE (OUTPATIENT)
Dept: PHYSICAL MEDICINE AND REHAB | Age: 54
End: 2019-09-26

## 2019-09-26 ENCOUNTER — OFFICE VISIT (OUTPATIENT)
Dept: PHYSICAL MEDICINE AND REHAB | Age: 54
End: 2019-09-26
Payer: MEDICAID

## 2019-09-26 VITALS
HEIGHT: 70 IN | SYSTOLIC BLOOD PRESSURE: 122 MMHG | BODY MASS INDEX: 19.33 KG/M2 | HEART RATE: 81 BPM | DIASTOLIC BLOOD PRESSURE: 76 MMHG | WEIGHT: 135 LBS

## 2019-09-26 DIAGNOSIS — M17.0 PRIMARY OSTEOARTHRITIS OF BOTH KNEES: ICD-10-CM

## 2019-09-26 DIAGNOSIS — M54.50 CHRONIC BILATERAL LOW BACK PAIN WITHOUT SCIATICA: Primary | ICD-10-CM

## 2019-09-26 DIAGNOSIS — M47.816 LUMBAR SPONDYLOSIS: ICD-10-CM

## 2019-09-26 DIAGNOSIS — M25.561 CHRONIC PAIN OF BOTH KNEES: ICD-10-CM

## 2019-09-26 DIAGNOSIS — G89.29 CHRONIC BILATERAL LOW BACK PAIN WITHOUT SCIATICA: Primary | ICD-10-CM

## 2019-09-26 DIAGNOSIS — M25.562 CHRONIC PAIN OF BOTH KNEES: ICD-10-CM

## 2019-09-26 DIAGNOSIS — G89.29 CHRONIC PAIN OF BOTH KNEES: ICD-10-CM

## 2019-09-26 PROCEDURE — G8427 DOCREV CUR MEDS BY ELIG CLIN: HCPCS | Performed by: PHYSICAL MEDICINE & REHABILITATION

## 2019-09-26 PROCEDURE — G8420 CALC BMI NORM PARAMETERS: HCPCS | Performed by: PHYSICAL MEDICINE & REHABILITATION

## 2019-09-26 PROCEDURE — 3017F COLORECTAL CA SCREEN DOC REV: CPT | Performed by: PHYSICAL MEDICINE & REHABILITATION

## 2019-09-26 PROCEDURE — 4004F PT TOBACCO SCREEN RCVD TLK: CPT | Performed by: PHYSICAL MEDICINE & REHABILITATION

## 2019-09-26 PROCEDURE — 99214 OFFICE O/P EST MOD 30 MIN: CPT | Performed by: PHYSICAL MEDICINE & REHABILITATION

## 2019-09-26 NOTE — PROGRESS NOTES
Bridger Pike D.O. [place] [facility]  [address] [facility] [place]  Phone: [phone]  Fax: [phone]        9/26/19    Chief Complaint   Patient presents with    Knee Pain     Follow up after Euflexxa Injections    Back Pain       HPI:  Vinny Chavez is a 47y.o. year old man seen today in follow up regarding low back and knee pain. Interval history: Since the last visit the patient had a reduction in his knee pain form an 8 to a 5 with the Euflexxa injection. He had pain during the radiofrequency ablation on the left. He has been unable to use the tens unit because he cannot don it. Today, the pain is rated Pain Score:   5 where 0 is no pain and 10 is pain as bad as it can be. The pain is located in the low back,  does not radiate, and is described as aching. This pain occurs all day. The symptoms have been unchanged since onset. Symptoms are exacerbated by laying flat, standing, walking. Factors which relieve the pain include use of pillows for positioning. Other associated symptoms include stiffness. Otherwise, the pain assessment has not changed since the last visit.      Past Medical History:   Diagnosis Date    Osteoarthritis     Bilateral Knee, Back        Past Surgical History:   Procedure Laterality Date    ANESTHESIA NERVE BLOCK Bilateral 4/11/2019    BILATERAL INTRA-ARTICULAR FACET JOINT INJECTION WITH FLUOROSCOPIC GUIDANCE AT L3-4 AND L4-5 performed by Olga Harris DO at [address] [facility] Left     Elbow     NERVE BLOCK Bilateral 04/11/2019    NERVE BLOCK Left 08/01/2019    lumbar radiofrequency    RADIOFREQUENCY ABLATION NERVES Left 8/1/2019    RADIOFREQUENCY ABLATION LEFT L3-4 AND LEFT L4-5 FACET JOINTS THEN RIGHT L3-4 AND L4-5 FACET JOINTS (CPT [id number]) performed by Olga Harris DO at [address] History     Tobacco Use    Smoking status: Current Every Day Smoker     Packs/day: 1.00

## 2019-10-08 ENCOUNTER — PREP FOR PROCEDURE (OUTPATIENT)
Dept: PHYSICAL MEDICINE AND REHAB | Age: 54
End: 2019-10-08

## 2019-10-09 ENCOUNTER — ANESTHESIA EVENT (OUTPATIENT)
Dept: OPERATING ROOM | Age: 54
End: 2019-10-09
Payer: MEDICAID

## 2019-10-09 ASSESSMENT — LIFESTYLE VARIABLES: SMOKING_STATUS: 1

## 2019-10-10 ENCOUNTER — HOSPITAL ENCOUNTER (OUTPATIENT)
Age: 54
Setting detail: OUTPATIENT SURGERY
Discharge: HOME | End: 2019-10-10
Attending: PHYSICAL MEDICINE & REHABILITATION | Admitting: PHYSICAL MEDICINE & REHABILITATION
Payer: MEDICAID

## 2019-10-10 ENCOUNTER — HOSPITAL ENCOUNTER (OUTPATIENT)
Dept: OPERATING ROOM | Age: 54
Setting detail: OUTPATIENT SURGERY
Discharge: HOME | End: 2019-10-10
Attending: PHYSICAL MEDICINE & REHABILITATION
Payer: MEDICAID

## 2019-10-10 ENCOUNTER — ANESTHESIA (OUTPATIENT)
Dept: OPERATING ROOM | Age: 54
End: 2019-10-10
Payer: MEDICAID

## 2019-10-10 VITALS
OXYGEN SATURATION: 100 % | RESPIRATION RATE: 21 BRPM | SYSTOLIC BLOOD PRESSURE: 110 MMHG | DIASTOLIC BLOOD PRESSURE: 74 MMHG

## 2019-10-10 VITALS
HEIGHT: 70 IN | DIASTOLIC BLOOD PRESSURE: 80 MMHG | OXYGEN SATURATION: 98 % | SYSTOLIC BLOOD PRESSURE: 136 MMHG | RESPIRATION RATE: 14 BRPM | BODY MASS INDEX: 18.9 KG/M2 | WEIGHT: 132 LBS | HEART RATE: 96 BPM | TEMPERATURE: 98.6 F

## 2019-10-10 DIAGNOSIS — M47.896 OTHER OSTEOARTHRITIS OF SPINE, LUMBAR REGION: ICD-10-CM

## 2019-10-10 PROCEDURE — 2580000003 HC RX 258: Performed by: ANESTHESIOLOGY

## 2019-10-10 PROCEDURE — 3600000005 HC SURGERY LEVEL 5 BASE: Performed by: PHYSICAL MEDICINE & REHABILITATION

## 2019-10-10 PROCEDURE — 7100000011 HC PHASE II RECOVERY - ADDTL 15 MIN: Performed by: PHYSICAL MEDICINE & REHABILITATION

## 2019-10-10 PROCEDURE — 3209999900 FLUORO FOR SURGICAL PROCEDURES

## 2019-10-10 PROCEDURE — 3600000015 HC SURGERY LEVEL 5 ADDTL 15MIN: Performed by: PHYSICAL MEDICINE & REHABILITATION

## 2019-10-10 PROCEDURE — 2500000003 HC RX 250 WO HCPCS: Performed by: PHYSICAL MEDICINE & REHABILITATION

## 2019-10-10 PROCEDURE — 3700000000 HC ANESTHESIA ATTENDED CARE: Performed by: PHYSICAL MEDICINE & REHABILITATION

## 2019-10-10 PROCEDURE — 64636 DESTROY L/S FACET JNT ADDL: CPT | Performed by: PHYSICAL MEDICINE & REHABILITATION

## 2019-10-10 PROCEDURE — 3700000001 HC ADD 15 MINUTES (ANESTHESIA): Performed by: PHYSICAL MEDICINE & REHABILITATION

## 2019-10-10 PROCEDURE — 6360000002 HC RX W HCPCS: Performed by: NURSE ANESTHETIST, CERTIFIED REGISTERED

## 2019-10-10 PROCEDURE — 64635 DESTROY LUMB/SAC FACET JNT: CPT | Performed by: PHYSICAL MEDICINE & REHABILITATION

## 2019-10-10 PROCEDURE — 2709999900 HC NON-CHARGEABLE SUPPLY: Performed by: PHYSICAL MEDICINE & REHABILITATION

## 2019-10-10 PROCEDURE — C1713 ANCHOR/SCREW BN/BN,TIS/BN: HCPCS | Performed by: PHYSICAL MEDICINE & REHABILITATION

## 2019-10-10 PROCEDURE — 7100000010 HC PHASE II RECOVERY - FIRST 15 MIN: Performed by: PHYSICAL MEDICINE & REHABILITATION

## 2019-10-10 RX ORDER — FENTANYL CITRATE 50 UG/ML
25 INJECTION, SOLUTION INTRAMUSCULAR; INTRAVENOUS EVERY 5 MIN PRN
Status: DISCONTINUED | OUTPATIENT
Start: 2019-10-10 | End: 2019-10-10 | Stop reason: HOSPADM

## 2019-10-10 RX ORDER — MIDAZOLAM HYDROCHLORIDE 1 MG/ML
INJECTION INTRAMUSCULAR; INTRAVENOUS PRN
Status: DISCONTINUED | OUTPATIENT
Start: 2019-10-10 | End: 2019-10-10 | Stop reason: SDUPTHER

## 2019-10-10 RX ORDER — LIDOCAINE HYDROCHLORIDE 20 MG/ML
INJECTION, SOLUTION EPIDURAL; INFILTRATION; INTRACAUDAL; PERINEURAL PRN
Status: DISCONTINUED | OUTPATIENT
Start: 2019-10-10 | End: 2019-10-10 | Stop reason: ALTCHOICE

## 2019-10-10 RX ORDER — LIDOCAINE HYDROCHLORIDE 10 MG/ML
INJECTION, SOLUTION EPIDURAL; INFILTRATION; INTRACAUDAL; PERINEURAL PRN
Status: DISCONTINUED | OUTPATIENT
Start: 2019-10-10 | End: 2019-10-10 | Stop reason: ALTCHOICE

## 2019-10-10 RX ORDER — MEPERIDINE HYDROCHLORIDE 50 MG/ML
12.5 INJECTION INTRAMUSCULAR; INTRAVENOUS; SUBCUTANEOUS EVERY 5 MIN PRN
Status: DISCONTINUED | OUTPATIENT
Start: 2019-10-10 | End: 2019-10-10 | Stop reason: HOSPADM

## 2019-10-10 RX ORDER — FENTANYL CITRATE 50 UG/ML
INJECTION, SOLUTION INTRAMUSCULAR; INTRAVENOUS PRN
Status: DISCONTINUED | OUTPATIENT
Start: 2019-10-10 | End: 2019-10-10 | Stop reason: SDUPTHER

## 2019-10-10 RX ORDER — SODIUM CHLORIDE, SODIUM LACTATE, POTASSIUM CHLORIDE, CALCIUM CHLORIDE 600; 310; 30; 20 MG/100ML; MG/100ML; MG/100ML; MG/100ML
INJECTION, SOLUTION INTRAVENOUS CONTINUOUS
Status: DISCONTINUED | OUTPATIENT
Start: 2019-10-10 | End: 2019-10-10 | Stop reason: HOSPADM

## 2019-10-10 RX ORDER — FENTANYL CITRATE 50 UG/ML
50 INJECTION, SOLUTION INTRAMUSCULAR; INTRAVENOUS EVERY 5 MIN PRN
Status: DISCONTINUED | OUTPATIENT
Start: 2019-10-10 | End: 2019-10-10 | Stop reason: HOSPADM

## 2019-10-10 RX ORDER — HYDROCODONE BITARTRATE AND ACETAMINOPHEN 5; 325 MG/1; MG/1
1 TABLET ORAL PRN
Status: DISCONTINUED | OUTPATIENT
Start: 2019-10-10 | End: 2019-10-10 | Stop reason: HOSPADM

## 2019-10-10 RX ORDER — HYDROCODONE BITARTRATE AND ACETAMINOPHEN 5; 325 MG/1; MG/1
2 TABLET ORAL PRN
Status: DISCONTINUED | OUTPATIENT
Start: 2019-10-10 | End: 2019-10-10 | Stop reason: HOSPADM

## 2019-10-10 RX ADMIN — FENTANYL CITRATE 50 MCG: 50 INJECTION, SOLUTION INTRAMUSCULAR; INTRAVENOUS at 08:13

## 2019-10-10 RX ADMIN — FENTANYL CITRATE 25 MCG: 50 INJECTION, SOLUTION INTRAMUSCULAR; INTRAVENOUS at 08:18

## 2019-10-10 RX ADMIN — FENTANYL CITRATE 25 MCG: 50 INJECTION, SOLUTION INTRAMUSCULAR; INTRAVENOUS at 08:15

## 2019-10-10 RX ADMIN — MIDAZOLAM 1 MG: 1 INJECTION INTRAMUSCULAR; INTRAVENOUS at 08:11

## 2019-10-10 RX ADMIN — MIDAZOLAM 1 MG: 1 INJECTION INTRAMUSCULAR; INTRAVENOUS at 08:13

## 2019-10-10 RX ADMIN — SODIUM CHLORIDE, POTASSIUM CHLORIDE, SODIUM LACTATE AND CALCIUM CHLORIDE: 600; 310; 30; 20 INJECTION, SOLUTION INTRAVENOUS at 07:47

## 2019-10-10 ASSESSMENT — PAIN DESCRIPTION - DESCRIPTORS: DESCRIPTORS: ACHING;DISCOMFORT

## 2019-10-10 ASSESSMENT — PAIN - FUNCTIONAL ASSESSMENT: PAIN_FUNCTIONAL_ASSESSMENT: 0-10

## 2019-10-10 ASSESSMENT — PULMONARY FUNCTION TESTS
PIF_VALUE: 0

## 2019-10-10 ASSESSMENT — PAIN SCALES - GENERAL
PAINLEVEL_OUTOF10: 0

## 2019-11-01 RX ORDER — IBUPROFEN 800 MG/1
800 TABLET ORAL EVERY 8 HOURS PRN
Qty: 10 TABLET | Refills: 0 | Status: SHIPPED | OUTPATIENT
Start: 2019-11-01 | End: 2019-11-08 | Stop reason: SDUPTHER

## 2019-11-08 ENCOUNTER — OFFICE VISIT (OUTPATIENT)
Dept: PHYSICAL MEDICINE AND REHAB | Age: 54
End: 2019-11-08
Payer: MEDICAID

## 2019-11-08 VITALS
SYSTOLIC BLOOD PRESSURE: 126 MMHG | HEART RATE: 87 BPM | HEIGHT: 70 IN | BODY MASS INDEX: 20.04 KG/M2 | WEIGHT: 140 LBS | DIASTOLIC BLOOD PRESSURE: 84 MMHG

## 2019-11-08 DIAGNOSIS — M25.40 ARTHROPATHY OF SPINAL FACET JOINT CONCURRENT WITH AND DUE TO EFFUSION: ICD-10-CM

## 2019-11-08 DIAGNOSIS — G89.29 CHRONIC BILATERAL LOW BACK PAIN WITHOUT SCIATICA: Primary | ICD-10-CM

## 2019-11-08 DIAGNOSIS — M25.69 BACK STIFFNESS: ICD-10-CM

## 2019-11-08 DIAGNOSIS — M47.819 ARTHROPATHY OF SPINAL FACET JOINT CONCURRENT WITH AND DUE TO EFFUSION: ICD-10-CM

## 2019-11-08 DIAGNOSIS — M47.816 LUMBAR SPONDYLOSIS: ICD-10-CM

## 2019-11-08 DIAGNOSIS — M54.50 CHRONIC BILATERAL LOW BACK PAIN WITHOUT SCIATICA: Primary | ICD-10-CM

## 2019-11-08 PROCEDURE — 4004F PT TOBACCO SCREEN RCVD TLK: CPT | Performed by: PHYSICAL MEDICINE & REHABILITATION

## 2019-11-08 PROCEDURE — 3017F COLORECTAL CA SCREEN DOC REV: CPT | Performed by: PHYSICAL MEDICINE & REHABILITATION

## 2019-11-08 PROCEDURE — 99214 OFFICE O/P EST MOD 30 MIN: CPT | Performed by: PHYSICAL MEDICINE & REHABILITATION

## 2019-11-08 PROCEDURE — G8427 DOCREV CUR MEDS BY ELIG CLIN: HCPCS | Performed by: PHYSICAL MEDICINE & REHABILITATION

## 2019-11-08 PROCEDURE — G8420 CALC BMI NORM PARAMETERS: HCPCS | Performed by: PHYSICAL MEDICINE & REHABILITATION

## 2019-11-08 PROCEDURE — G8484 FLU IMMUNIZE NO ADMIN: HCPCS | Performed by: PHYSICAL MEDICINE & REHABILITATION

## 2019-11-08 RX ORDER — KETOROLAC TROMETHAMINE 15 MG/ML
15 INJECTION, SOLUTION INTRAMUSCULAR; INTRAVENOUS ONCE
Status: COMPLETED | OUTPATIENT
Start: 2019-11-08 | End: 2019-11-08

## 2019-11-08 RX ORDER — IBUPROFEN 800 MG/1
800 TABLET ORAL EVERY 8 HOURS PRN
Qty: 270 TABLET | Refills: 3 | Status: SHIPPED
Start: 2019-11-08 | End: 2020-03-05 | Stop reason: ALTCHOICE

## 2019-11-08 RX ADMIN — KETOROLAC TROMETHAMINE 15 MG: 15 INJECTION, SOLUTION INTRAMUSCULAR; INTRAVENOUS at 12:15

## 2020-02-03 ENCOUNTER — TELEPHONE (OUTPATIENT)
Dept: PHYSICAL MEDICINE AND REHAB | Age: 55
End: 2020-02-03

## 2020-02-04 ENCOUNTER — INITIAL CONSULT (OUTPATIENT)
Dept: NEUROSURGERY | Age: 55
End: 2020-02-04
Payer: MEDICAID

## 2020-02-04 VITALS
HEART RATE: 95 BPM | BODY MASS INDEX: 20.9 KG/M2 | HEIGHT: 70 IN | SYSTOLIC BLOOD PRESSURE: 126 MMHG | DIASTOLIC BLOOD PRESSURE: 78 MMHG | WEIGHT: 146 LBS

## 2020-02-04 PROCEDURE — G8427 DOCREV CUR MEDS BY ELIG CLIN: HCPCS | Performed by: NEUROLOGICAL SURGERY

## 2020-02-04 PROCEDURE — G8420 CALC BMI NORM PARAMETERS: HCPCS | Performed by: NEUROLOGICAL SURGERY

## 2020-02-04 PROCEDURE — G8484 FLU IMMUNIZE NO ADMIN: HCPCS | Performed by: NEUROLOGICAL SURGERY

## 2020-02-04 PROCEDURE — 99243 OFF/OP CNSLTJ NEW/EST LOW 30: CPT | Performed by: NEUROLOGICAL SURGERY

## 2020-02-04 ASSESSMENT — ENCOUNTER SYMPTOMS
ALLERGIC/IMMUNOLOGIC NEGATIVE: 1
EYES NEGATIVE: 1
GASTROINTESTINAL NEGATIVE: 1
BOWEL INCONTINENCE: 0
ABDOMINAL PAIN: 0
RESPIRATORY NEGATIVE: 1
BACK PAIN: 1

## 2020-02-04 NOTE — LETTER
[facility]  [address]  Phone: [phone]  Fax: [phone]    Kylee Byrnes MD      February 4, 2020     Rodriguez Bryant DO  [address] [place] [address]    Patient: Izaiah Rudd  MR Number: [id number]  YOB: 1965  Date of Visit: 2/4/2020    Dear Dr. Nicolas Sánchez [facility]:    Thank you for the request for consultation for Becky Moeller to me for the evaluation of back pain. Below are the relevant portions of my assessment and plan of care. Assessment:  47year old male who presents with chronic back pain. He does not have radiculopathy. His MRI shows some mild disc bulges with foraminal stenosis. Plan:  I will get flexion-extension x-rays. If this does not show listhesis then it is unlikely that surgery will help him  If you have questions, please do not hesitate to call me. I look forward to following Sarah Blankenship along with you.     Sincerely,        Kylee Byrnes MD

## 2020-02-04 NOTE — COMMUNICATION BODY
Assessment:  47year old male who presents with chronic back pain. He does not have radiculopathy. His MRI shows some mild disc bulges with foraminal stenosis. Plan:  I will get flexion-extension x-rays.   If this does not show listhesis then it is unlikely that surgery will help him

## 2020-02-04 NOTE — PROGRESS NOTES
Subjective:      Patient ID: Kahlil Moyer is a 47 y.o. male. Back Pain   This is a chronic problem. The current episode started more than 1 year ago. The problem occurs constantly. The problem has been gradually worsening since onset. The pain is present in the lumbar spine. The quality of the pain is described as aching, stabbing and shooting. The pain does not radiate. The pain is at a severity of 7/10. The pain is moderate. The pain is the same all the time. The symptoms are aggravated by position. Stiffness is present in the morning. Pertinent negatives include no abdominal pain, bladder incontinence, bowel incontinence, chest pain, dysuria, fever, headaches, leg pain, numbness, paresis, paresthesias, pelvic pain, perianal numbness, tingling, weakness or weight loss. He has tried analgesics, NSAIDs, ice, heat and muscle relaxant for the symptoms. The treatment provided mild relief. Review of Systems   Constitutional: Negative. Negative for fever and weight loss. HENT: Negative. Eyes: Negative. Respiratory: Negative. Cardiovascular: Negative. Negative for chest pain. Gastrointestinal: Negative. Negative for abdominal pain and bowel incontinence. Endocrine: Negative. Genitourinary: Negative. Negative for bladder incontinence, dysuria and pelvic pain. Musculoskeletal: Positive for back pain. Skin: Negative. Allergic/Immunologic: Negative. Neurological: Negative. Negative for tingling, weakness, numbness, headaches and paresthesias. Hematological: Negative. Psychiatric/Behavioral: Negative. Objective:   Physical Exam  Vitals signs reviewed. Constitutional:       General: He is not in acute distress. Appearance: He is not ill-appearing or diaphoretic. HENT:      Head: Normocephalic and atraumatic. Nose: Nose normal.      Mouth/Throat:      Pharynx: No oropharyngeal exudate or posterior oropharyngeal erythema. Eyes:      General: No scleral icterus. help him        Satnam Morales MD

## 2020-02-05 ENCOUNTER — TELEPHONE (OUTPATIENT)
Dept: NEUROSURGERY | Age: 55
End: 2020-02-05

## 2020-02-05 ENCOUNTER — HOSPITAL ENCOUNTER (OUTPATIENT)
Age: 55
Discharge: HOME | End: 2020-02-07
Payer: MEDICAID

## 2020-02-05 ENCOUNTER — HOSPITAL ENCOUNTER (OUTPATIENT)
Dept: GENERAL RADIOLOGY | Age: 55
Discharge: HOME | End: 2020-02-07
Payer: MEDICAID

## 2020-02-05 PROCEDURE — 72120 X-RAY BEND ONLY L-S SPINE: CPT

## 2020-02-06 ENCOUNTER — TELEPHONE (OUTPATIENT)
Dept: NEUROSURGERY | Age: 55
End: 2020-02-06

## 2020-02-19 ENCOUNTER — OFFICE VISIT (OUTPATIENT)
Dept: PHYSICAL MEDICINE AND REHAB | Age: 55
End: 2020-02-19
Payer: MEDICAID

## 2020-02-19 VITALS
WEIGHT: 145 LBS | DIASTOLIC BLOOD PRESSURE: 76 MMHG | HEART RATE: 88 BPM | HEIGHT: 70 IN | SYSTOLIC BLOOD PRESSURE: 125 MMHG | BODY MASS INDEX: 20.76 KG/M2

## 2020-02-19 PROCEDURE — 99214 OFFICE O/P EST MOD 30 MIN: CPT | Performed by: PHYSICAL MEDICINE & REHABILITATION

## 2020-02-19 PROCEDURE — G8484 FLU IMMUNIZE NO ADMIN: HCPCS | Performed by: PHYSICAL MEDICINE & REHABILITATION

## 2020-02-19 PROCEDURE — 3017F COLORECTAL CA SCREEN DOC REV: CPT | Performed by: PHYSICAL MEDICINE & REHABILITATION

## 2020-02-19 PROCEDURE — 4004F PT TOBACCO SCREEN RCVD TLK: CPT | Performed by: PHYSICAL MEDICINE & REHABILITATION

## 2020-02-19 PROCEDURE — G8420 CALC BMI NORM PARAMETERS: HCPCS | Performed by: PHYSICAL MEDICINE & REHABILITATION

## 2020-02-19 PROCEDURE — G8427 DOCREV CUR MEDS BY ELIG CLIN: HCPCS | Performed by: PHYSICAL MEDICINE & REHABILITATION

## 2020-02-19 RX ORDER — KETOROLAC TROMETHAMINE 15 MG/ML
15 INJECTION, SOLUTION INTRAMUSCULAR; INTRAVENOUS ONCE
Status: COMPLETED | OUTPATIENT
Start: 2020-02-19 | End: 2020-02-19

## 2020-02-19 RX ORDER — HYDROCODONE BITARTRATE AND ACETAMINOPHEN 5; 325 MG/1; MG/1
1 TABLET ORAL EVERY 4 HOURS PRN
Qty: 18 TABLET | Refills: 0 | Status: SHIPPED
Start: 2020-02-19 | End: 2020-07-29 | Stop reason: SDUPTHER

## 2020-02-19 RX ADMIN — KETOROLAC TROMETHAMINE 15 MG: 15 INJECTION, SOLUTION INTRAMUSCULAR; INTRAVENOUS at 10:48

## 2020-02-19 NOTE — PROGRESS NOTES
Quique Aparicio D.O. [facility]  [address] [facility] [place]  Phone: [phone]  Fax: [phone]        2/19/20    Chief Complaint   Patient presents with    Back Pain     increased back pain saw surgeon        HPI:  Vamsi Choe is a 47y.o. year old man seen today in follow up regarding low back pain. Interval history: Since the last visit the patient has been doing about the same. He saw Dr. Nigel Hair who did not have any surgical intervention to offer him. He has run out of his bttn since Dr. Fatemeh Alexis office closed and is requesting a referral to pain management today. Today, the pain is rated Pain Score:   8 where 0 is no pain and 10 is pain as bad as it can be. The pain is located in the low back,  does not radiate, and is described as aching. This pain occurs intermittently. The symptoms have been worse since onset. Symptoms are exacerbated by walking. Factors which relieve the pain include nothing. Other associated symptoms include stiffness  Otherwise, the pain assessment has not changed since the last visit.      Past Medical History:   Diagnosis Date    Osteoarthritis     Bilateral Knee, Back      Past Surgical History:   Procedure Laterality Date    ANESTHESIA NERVE BLOCK Bilateral 4/11/2019    BILATERAL INTRA-ARTICULAR FACET JOINT INJECTION WITH FLUOROSCOPIC GUIDANCE AT L3-4 AND L4-5 performed by Kavon Arenas DO at [facility] Left     Elbow     HERNIA REPAIR      NERVE BLOCK Bilateral 04/11/2019    NERVE BLOCK Left 08/01/2019    lumbar radiofrequency    NERVE BLOCK Right 10/10/2019    lumbar medial branch neurolysis     RADIOFREQUENCY ABLATION NERVES Left 8/1/2019    RADIOFREQUENCY ABLATION LEFT L3-4 AND LEFT L4-5 FACET JOINTS THEN RIGHT L3-4 AND L4-5 FACET JOINTS (CPT [id number]) performed by Kavon Arenas DO at [address] Right 10/10/2019 RADIOFREQUENCY ABLATION RIGHT L3-4 AND L4-5 FACET JOINTS performed by DO Ramez at [facility]         Social History     Tobacco Use    Smoking status: Current Every Day Smoker     Packs/day: 1.00     Types: Cigarettes     Start date: 1/1/1983    Smokeless tobacco: Never Used    Tobacco comment: smokes 1-1.5 ppd   Substance Use Topics    Alcohol use: No     Comment: Recovering alcoholic 05/98/3476    Drug use: No       Family History   Problem Relation Age of Onset    Stroke Mother     COPD Mother     Diabetes Mother        Current Outpatient Medications   Medication Sig Dispense Refill    HYDROcodone-acetaminophen (NORCO) 5-325 MG per tablet Take 1 tablet by mouth every 4 hours as needed for Pain for up to 3 days. Intended supply: 3 days. Take lowest dose possible to manage pain 18 tablet 0    albuterol sulfate  (90 Base) MCG/ACT inhaler Inhale 2 puffs into the lungs 4 times daily as needed for Wheezing 3 Inhaler 0    Tens Unit MISC by Does not apply route 1 each 0    HYDROcodone-acetaminophen (NORCO)  MG per tablet take 1 tablet by mouth every 8 hours if needed for pain  0    cyclobenzaprine (FLEXERIL) 10 MG tablet Take 10 mg by mouth 3 times daily as needed for Muscle spasms      ibuprofen (ADVIL;MOTRIN) 800 MG tablet Take 1 tablet by mouth every 8 hours as needed for Pain 270 tablet 3    diclofenac sodium (VOLTAREN) 1 % GEL Apply 4 g topically 4 times daily as needed (pain) 480 g 2     Current Facility-Administered Medications   Medication Dose Route Frequency Provider Last Rate Last Dose    ketorolac (TORADOL) injection 15 mg  15 mg Intramuscular Once Anny Camacho DO           No Known Allergies    Review of Systems:  No new weakness, paresthesia, incontinence of bowel or bladder, saddle anesthesia, falls or gait dysfunction. Otherwise, per HPI.      Physical Exam:   Blood pressure 125/76, pulse 88, height 5' 10\" (1.778 m), weight 145 lb

## 2020-03-05 ENCOUNTER — TELEPHONE (OUTPATIENT)
Dept: PHYSICAL MEDICINE AND REHAB | Age: 55
End: 2020-03-05

## 2020-03-05 RX ORDER — CELECOXIB 100 MG/1
100 CAPSULE ORAL DAILY
Qty: 60 CAPSULE | Refills: 2 | Status: SHIPPED
Start: 2020-03-05 | End: 2020-04-01

## 2020-03-05 NOTE — TELEPHONE ENCOUNTER
Patient called regarding this. I informed him of the physician's response. He voiced understanding and said he received a call from the pharmacy stating Celebrex needs a PA.  I will submit the request.

## 2020-03-06 ENCOUNTER — TELEPHONE (OUTPATIENT)
Dept: PHYSICAL MEDICINE AND REHAB | Age: 55
End: 2020-03-06

## 2020-03-18 ENCOUNTER — TELEPHONE (OUTPATIENT)
Dept: PHYSICAL MEDICINE AND REHAB | Age: 55
End: 2020-03-18

## 2020-03-18 ENCOUNTER — OFFICE VISIT (OUTPATIENT)
Dept: PAIN MANAGEMENT | Age: 55
End: 2020-03-18
Payer: MEDICAID

## 2020-03-18 VITALS
HEIGHT: 70 IN | HEART RATE: 86 BPM | OXYGEN SATURATION: 95 % | SYSTOLIC BLOOD PRESSURE: 120 MMHG | DIASTOLIC BLOOD PRESSURE: 80 MMHG | BODY MASS INDEX: 20.47 KG/M2 | RESPIRATION RATE: 16 BRPM | TEMPERATURE: 98.2 F | WEIGHT: 143 LBS

## 2020-03-18 PROCEDURE — G8427 DOCREV CUR MEDS BY ELIG CLIN: HCPCS | Performed by: ANESTHESIOLOGY

## 2020-03-18 PROCEDURE — G8420 CALC BMI NORM PARAMETERS: HCPCS | Performed by: ANESTHESIOLOGY

## 2020-03-18 PROCEDURE — 99244 OFF/OP CNSLTJ NEW/EST MOD 40: CPT | Performed by: ANESTHESIOLOGY

## 2020-03-18 PROCEDURE — G8484 FLU IMMUNIZE NO ADMIN: HCPCS | Performed by: ANESTHESIOLOGY

## 2020-03-18 NOTE — PROGRESS NOTES
procedures/ nerve blocks: yes, No relief      He has not been on anticoagulation medications no. He has not been on herbal supplements. He is not diabetic. H/O Smoking: yes    H/O alcohol abuse : recovering alcoholic (sober for 11 yrs). Goes to [employer] regularly    H/O Illicit drug use : denies    Employment: disability    Imaging:   X ray LS spine: Flex/ Ext: 2/2020: Impression   Findings consistent with degenerative disc disease and   degenerative facets disease. Grade 1 retrolisthesis of L3 on L4 with   no significant subluxation with flexion-extension. MRI fo LS spine: 3/19/2019:  Findings:    Sagittal images document preservation of anatomic alignment of   vertebrae with preservation of normal cortical and marrow signal   throughout. Disc spacing and signal are uniform, with the exception of   decreased disc height and signal at the L3-4 and L4-5 levels,   associated with subtle dorsal disc prominence adjacent the anterior   central canal. The conus medullaris extends to the L1 level, in the   distribution of roots of the cauda equina is uniform. Foramina appear   patent bilaterally, although there is narrowing due to telescoping of   facets at the L3 and L4 root level on the right. Reported symptoms are   on the left. There is no abnormal STIR signal to suggest acute trauma   with bone marrow edema or adjacent paraspinous soft tissue edema.       Coronal images document coronal images show no evidence of paraspinous   soft tissue pathology. Sacroiliac joints are unremarkable.  The   transverse diameter of the central spinal canal is patent throughout.       Axial images best depict patency of the central canal and foramina as   follows:   T12-L1: Unremarkable   L1-L2: Unremarkable     L2-L3: Unremarkable   L3-L4: There is a broad-based transverse disc bulge in conjunction   with a circumferential disc bulge, greater eccentric to the right,,   but also possibly abutting the descending L4 nerve lumbar radiofrequency    NERVE BLOCK Right 10/10/2019    lumbar medial branch neurolysis     RADIOFREQUENCY ABLATION NERVES Left 8/1/2019    RADIOFREQUENCY ABLATION LEFT L3-4 AND LEFT L4-5 FACET JOINTS THEN RIGHT L3-4 AND L4-5 FACET JOINTS (CPT 40963) performed by Judson Nuñez DO at [address] Right 10/10/2019    RADIOFREQUENCY ABLATION RIGHT L3-4 AND L4-5 FACET JOINTS performed by Judson Nuñez DO at [address] [facility]         Prior to Admission medications    Medication Sig Start Date End Date Taking?  Authorizing Provider   cyclobenzaprine (FLEXERIL) 10 MG tablet Take 10 mg by mouth 3 times daily as needed for Muscle spasms   Yes Historical Provider, MD   celecoxib (CELEBREX) 100 MG capsule Take 1 capsule by mouth daily  Patient not taking: Reported on 3/18/2020 3/5/20   Tawanna Camacho DO   diclofenac sodium (VOLTAREN) 1 % GEL Apply 4 g topically 4 times daily as needed (pain) 9/26/19 10/26/19  Anny Camacho DO   albuterol sulfate  (90 Base) MCG/ACT inhaler Inhale 2 puffs into the lungs 4 times daily as needed for Wheezing  Patient not taking: Reported on 3/18/2020 4/2/19   Sai Key DO   Tens Unit MISC by Does not apply route  Patient not taking: Reported on 3/18/2020 1/11/19   Nieves Camacho DO   HYDROcodone-acetaminophen (Grayson Bolls)  MG per tablet take 1 tablet by mouth every 8 hours if needed for pain 7/30/18   Historical Provider, MD       No Known Allergies    Social History     Socioeconomic History    Marital status: Single     Spouse name: Not on file    Number of children: Not on file    Years of education: Not on file    Highest education level: Not on file   Occupational History    Not on file   Social Needs    Financial resource strain: Not on file    Food insecurity     Worry: Not on file     Inability: Not on file    Transportation needs     Medical: Not on file     Non-medical: Not on file Tobacco Use    Smoking status: Current Every Day Smoker     Packs/day: 1.00     Types: Cigarettes     Start date: 1/1/1983    Smokeless tobacco: Never Used    Tobacco comment: smokes 1-1.5 ppd   Substance and Sexual Activity    Alcohol use: No     Comment: Recovering alcoholic 69/46/3994    Drug use: No    Sexual activity: Not on file   Lifestyle    Physical activity     Days per week: Not on file     Minutes per session: Not on file    Stress: Not on file   Relationships    Social connections     Talks on phone: Not on file     Gets together: Not on file     Attends [employer] service: Not on file     Active member of club or organization: Not on file     Attends meetings of clubs or organizations: Not on file     Relationship status: Not on file    Intimate partner violence     Fear of current or ex partner: Not on file     Emotionally abused: Not on file     Physically abused: Not on file     Forced sexual activity: Not on file   Other Topics Concern    Not on file   Social History Narrative    Not on file       Family History   Problem Relation Age of Onset    Stroke Mother     COPD Mother     Diabetes Mother        REVIEW OF SYSTEMS:     Patient specifically denies fever/chills, chest pain, shortness of breath, new bowel or bladder complaints. All other review of systems was negative.   General ROS: negative for - chills, fever, night sweats or weight loss  Psychological ROS: recovering alcoholic  Ophthalmic ROS: negative  ENT ROS: negative  Allergy and Immunology ROS: negative  Hematological and Lymphatic ROS: negative  Endocrine ROS: negative  Respiratory ROS: no cough, shortness of breath, or wheezing  Cardiovascular ROS: no chest pain or dyspnea on exertion  Gastrointestinal ROS: no abdominal pain, change in bowel habits, or black or bloody stools  Genito-Urinary ROS: no dysuria, trouble voiding, or hematuria  Musculoskeletal ROS: See HPI  Neurological ROS: no TIA or stroke symptoms  Dermatological ROS: negative     PHYSICAL EXAMINATION:      /80   Pulse 86   Temp 98.2 °F (36.8 °C) (Oral)   Resp 16   Ht 5' 10\" (1.778 m)   Wt 143 lb (64.9 kg)   SpO2 95%   BMI 20.52 kg/m²     General:      General appearance:  Pleasant and well-hydrated, in no distress and A & O x 3  Build:Normal Weight  Function: Rises from seated position easily and Moves about room without difficulty  Component of over reaction noted. Alyse's signs +    HEENT:    Head:normocephalic, atraumatic  Pupils:regular, round, equal  Sclera: icterus absent    Lungs:    Breathing:normal breathing pattern     CVS:     RRR    Abdomen:    Shape:non-distended and normal  Tenderness:none  Guarding:none    Cervical spine:    Inspection:normal  Palpation:tenderness paravertebral muscles, tenderness trapezium, left, right and positive. Range of motion:Normal flexion, extension, rotation bilaterally and is not painful. Spurling's: negative bilaterally    Thoracic spine:     Spine inspection:normal   Palpation:No tenderness over the midline and paraspinal area, bilaterally  Range of motion:normal in flexion, extension rotation bilateral and is not painful. Lumbar spine:    Spine inspection: Normal   Palpation: Tenderness paravertebral muscles Yes bilaterally  Range of motion: Decreased, flexion Decreased, Lateral bending, extension and rotation bilaterally reduced is painful.   Sacroiliac joint tenderness No bilaterally  ZACH test: negative bilaterally  Gaenslen's test:negative bilaterally   Piriformis tenderness: negative bilaterally  SLR : negative bilaterally  Trochanteric bursa tenderness: negative bilaterally  CVA tenderness:No     Musculoskeletal:    Trigger points in trapezius: No bilaterally  Trigger points in rhomboids: No bilaterally  Trigger points in Paravertebral: No bilaterally    Extremities:    Tremors:None bilaterally upper and lower  Range of motion:Generally normal shoulders , pain with internal no    No follow up appointment made. Counseling :    Patient encouraged to stay active and to watch/lose weight    Encouraged to continue Regular home exercise program as tolerated - stretching / strengthening. Smoking cessation counseling : yes    Treatment plan discussed with the patient including medication and procedure side effects. Controlled Substances Monitoring:     [place]ARRS reviewed. Gabe Mohan MD    Dear Dr. Patrick Cheema,   Thank you for referring Mr. Hieu Sanchez and allowing us to participate in his care. Please do not hesitate to contact me if you have any questions regarding his care.     Hollis Canut MD      CC:    Deangelo Lobato, [address] [place] [address] [facility] [place], [address]

## 2020-03-18 NOTE — PROGRESS NOTES
Patient:  KENNY Ahumada 1965  Date of Service:  3/18/20        Patient presents with complaints of lower back  pain that started 5 years ago and has been getting worse. He states the pain began following taking care of his mother    Pain is constant and is described as aching, throbbing, shooting and stabbing. He rates the pain as a 10/10 on his worst day , 9/10 on his best day, and a 7/10 on average on the VAS scale. Pain does radiate to out to his sides He  has numbness of the left leg. Alleviating factors include: ice. Aggravating factors include:  movement, walking, bending. He states that the pain does keep him from sleeping at night. He took his last dose of     He is not on NSAIDS and  is not on anticoagulation medications to include none and is managed by     Previous treatments: Physical Therapy, Nerve block and medications. .      Personal Expectations from this treatment: decrease pain    /80   Pulse 86   Resp 16   Ht 5' 10\" (1.778 m)   Wt 143 lb (64.9 kg)   SpO2 95%   BMI 20.52 kg/m²     No LMP for male patient.

## 2020-03-19 ENCOUNTER — TELEPHONE (OUTPATIENT)
Dept: PAIN MANAGEMENT | Age: 55
End: 2020-03-19

## 2020-03-19 ENCOUNTER — TELEPHONE (OUTPATIENT)
Dept: PHYSICAL MEDICINE AND REHAB | Age: 55
End: 2020-03-19

## 2020-03-19 NOTE — TELEPHONE ENCOUNTER
The patient called the office stating Dr. Maryana Donohue office will not provide medication management for his lower back pain. He wants to know if he can be referred to [facility] Pain management for that. Also, he wants to know if there are any other injection options for his knees. He had the Euflexxa injections in both knees back in August 2019, and he only had about 2 weeks of relief.

## 2020-04-01 ENCOUNTER — TELEPHONE (OUTPATIENT)
Dept: PHYSICAL MEDICINE AND REHAB | Age: 55
End: 2020-04-01

## 2020-04-01 NOTE — TELEPHONE ENCOUNTER
[facility] does not take the patient's insurance. I called the patient and informed him of this. I also informed him that Dr. Lamona Hamman recommended the chronic pain Rehab program at [facility]. Patient said he can try that. I informed him that I will send the referral.    Patient stopped taking Celebrex 100mg BID after 3 days because it did nothing to help. He went back to alternating between Ibuprofen and Tylenol, but he heard Ibuprofen is not good to take during this time due to higher risks of nithin COVID-19.

## 2020-04-01 NOTE — TELEPHONE ENCOUNTER
[address] it is still pain management so you can send the referral already in the system there.

## 2020-06-01 ENCOUNTER — TELEPHONE (OUTPATIENT)
Dept: PHYSICAL MEDICINE AND REHAB | Age: 55
End: 2020-06-01

## 2020-06-04 NOTE — TELEPHONE ENCOUNTER
I spoke with the patient and discussed this with him. He agrees to proceed with Depo-Medrol steroid injections. We have the medication in stock now, so I scheduled his appointments on 6/8/20 1pm (for right knee) and 6/23/20 10am (for left knee).

## 2020-06-04 NOTE — TELEPHONE ENCOUNTER
I called the patient and he did not answer. I was unable to leave a message because his mailbox is full. Will try again later.

## 2020-06-08 ENCOUNTER — OFFICE VISIT (OUTPATIENT)
Dept: PHYSICAL MEDICINE AND REHAB | Age: 55
End: 2020-06-08
Payer: MEDICAID

## 2020-06-08 VITALS
TEMPERATURE: 98.9 F | SYSTOLIC BLOOD PRESSURE: 112 MMHG | HEART RATE: 85 BPM | WEIGHT: 150 LBS | DIASTOLIC BLOOD PRESSURE: 72 MMHG | BODY MASS INDEX: 21.47 KG/M2 | HEIGHT: 70 IN

## 2020-06-08 PROCEDURE — 96372 THER/PROPH/DIAG INJ SC/IM: CPT | Performed by: PHYSICAL MEDICINE & REHABILITATION

## 2020-06-08 PROCEDURE — 20611 DRAIN/INJ JOINT/BURSA W/US: CPT | Performed by: PHYSICAL MEDICINE & REHABILITATION

## 2020-06-08 RX ORDER — IBUPROFEN 800 MG/1
TABLET ORAL
Status: ON HOLD | COMMUNITY
Start: 2020-05-26 | End: 2020-07-01 | Stop reason: HOSPADM

## 2020-06-08 RX ORDER — METHYLPREDNISOLONE ACETATE 40 MG/ML
40 INJECTION, SUSPENSION INTRA-ARTICULAR; INTRALESIONAL; INTRAMUSCULAR; SOFT TISSUE ONCE
Status: COMPLETED | OUTPATIENT
Start: 2020-06-08 | End: 2020-06-08

## 2020-06-08 RX ORDER — LIDOCAINE HYDROCHLORIDE 10 MG/ML
7 INJECTION, SOLUTION INFILTRATION; PERINEURAL ONCE
Status: COMPLETED | OUTPATIENT
Start: 2020-06-08 | End: 2020-06-08

## 2020-06-08 RX ORDER — KETOROLAC TROMETHAMINE 15 MG/ML
15 INJECTION, SOLUTION INTRAMUSCULAR; INTRAVENOUS ONCE
Status: COMPLETED | OUTPATIENT
Start: 2020-06-08 | End: 2020-06-08

## 2020-06-08 RX ADMIN — METHYLPREDNISOLONE ACETATE 40 MG: 40 INJECTION, SUSPENSION INTRA-ARTICULAR; INTRALESIONAL; INTRAMUSCULAR; SOFT TISSUE at 13:55

## 2020-06-08 RX ADMIN — KETOROLAC TROMETHAMINE 15 MG: 15 INJECTION, SOLUTION INTRAMUSCULAR; INTRAVENOUS at 13:52

## 2020-06-08 RX ADMIN — LIDOCAINE HYDROCHLORIDE 7 ML: 10 INJECTION, SOLUTION INFILTRATION; PERINEURAL at 13:52

## 2020-06-16 ENCOUNTER — TELEPHONE (OUTPATIENT)
Dept: PHYSICAL MEDICINE AND REHAB | Age: 55
End: 2020-06-16

## 2020-06-18 ENCOUNTER — TELEPHONE (OUTPATIENT)
Dept: PHYSICAL MEDICINE AND REHAB | Age: 55
End: 2020-06-18

## 2020-06-21 ENCOUNTER — APPOINTMENT (OUTPATIENT)
Dept: GENERAL RADIOLOGY | Age: 55
DRG: 045 | End: 2020-06-21
Payer: MEDICAID

## 2020-06-21 ENCOUNTER — APPOINTMENT (OUTPATIENT)
Dept: CT IMAGING | Age: 55
DRG: 045 | End: 2020-06-21
Payer: MEDICAID

## 2020-06-21 ENCOUNTER — HOSPITAL ENCOUNTER (INPATIENT)
Age: 55
LOS: 3 days | Discharge: NURSING, REHAB OR LONG-TERM CARE | DRG: 045 | End: 2020-06-24
Attending: EMERGENCY MEDICINE | Admitting: INTERNAL MEDICINE
Payer: MEDICAID

## 2020-06-21 PROBLEM — I63.9 STROKE ABORTED BY ADMINISTRATION OF THROMBOLYTIC AGENT (HCC): Status: ACTIVE | Noted: 2020-06-21

## 2020-06-21 LAB
ALBUMIN SERPL-MCNC: 4.5 G/DL (ref 3.5–5.2)
ALP BLD-CCNC: 80 U/L (ref 40–129)
ALT SERPL-CCNC: 8 U/L (ref 0–40)
ANION GAP SERPL CALCULATED.3IONS-SCNC: 14 MMOL/L (ref 7–16)
APTT: 31.2 SEC (ref 24.5–35.1)
AST SERPL-CCNC: 16 U/L (ref 0–39)
BASOPHILS ABSOLUTE: 0.09 E9/L (ref 0–0.2)
BASOPHILS RELATIVE PERCENT: 0.4 % (ref 0–2)
BILIRUB SERPL-MCNC: 0.5 MG/DL (ref 0–1.2)
BUN BLDV-MCNC: 5 MG/DL (ref 6–20)
CALCIUM SERPL-MCNC: 9.8 MG/DL (ref 8.6–10.2)
CHLORIDE BLD-SCNC: 97 MMOL/L (ref 98–107)
CHP ED QC CHECK: NORMAL
CO2: 24 MMOL/L (ref 22–29)
CREAT SERPL-MCNC: 0.9 MG/DL (ref 0.7–1.2)
EOSINOPHILS ABSOLUTE: 0.16 E9/L (ref 0.05–0.5)
EOSINOPHILS RELATIVE PERCENT: 0.7 % (ref 0–6)
GFR AFRICAN AMERICAN: >60
GFR NON-AFRICAN AMERICAN: >60 ML/MIN/1.73
GLUCOSE BLD-MCNC: 124 MG/DL
GLUCOSE BLD-MCNC: 139 MG/DL (ref 74–99)
HCT VFR BLD CALC: 50.6 % (ref 37–54)
HEMOGLOBIN: 17 G/DL (ref 12.5–16.5)
IMMATURE GRANULOCYTES #: 0.12 E9/L
IMMATURE GRANULOCYTES %: 0.5 % (ref 0–5)
INR BLD: 0.9
LYMPHOCYTES ABSOLUTE: 2.75 E9/L (ref 1.5–4)
LYMPHOCYTES RELATIVE PERCENT: 12.3 % (ref 20–42)
MCH RBC QN AUTO: 30.7 PG (ref 26–35)
MCHC RBC AUTO-ENTMCNC: 33.6 % (ref 32–34.5)
MCV RBC AUTO: 91.5 FL (ref 80–99.9)
MONOCYTES ABSOLUTE: 1.27 E9/L (ref 0.1–0.95)
MONOCYTES RELATIVE PERCENT: 5.7 % (ref 2–12)
NEUTROPHILS ABSOLUTE: 18.04 E9/L (ref 1.8–7.3)
NEUTROPHILS RELATIVE PERCENT: 80.4 % (ref 43–80)
PDW BLD-RTO: 13.9 FL (ref 11.5–15)
PLATELET # BLD: 573 E9/L (ref 130–450)
PMV BLD AUTO: 8.7 FL (ref 7–12)
POTASSIUM SERPL-SCNC: 3.8 MMOL/L (ref 3.5–5)
PROTHROMBIN TIME: 10.1 SEC (ref 9.3–12.4)
RBC # BLD: 5.53 E12/L (ref 3.8–5.8)
SODIUM BLD-SCNC: 135 MMOL/L (ref 132–146)
TOTAL PROTEIN: 7.6 G/DL (ref 6.4–8.3)
TROPONIN: <0.01 NG/ML (ref 0–0.03)
WBC # BLD: 22.4 E9/L (ref 4.5–11.5)

## 2020-06-21 PROCEDURE — 0042T CT BRAIN PERFUSION: CPT

## 2020-06-21 PROCEDURE — 85025 COMPLETE CBC W/AUTO DIFF WBC: CPT

## 2020-06-21 PROCEDURE — 93005 ELECTROCARDIOGRAM TRACING: CPT | Performed by: EMERGENCY MEDICINE

## 2020-06-21 PROCEDURE — 6370000000 HC RX 637 (ALT 250 FOR IP): Performed by: INTERNAL MEDICINE

## 2020-06-21 PROCEDURE — 2000000000 HC ICU R&B

## 2020-06-21 PROCEDURE — 6360000004 HC RX CONTRAST MEDICATION: Performed by: RADIOLOGY

## 2020-06-21 PROCEDURE — 94760 N-INVAS EAR/PLS OXIMETRY 1: CPT

## 2020-06-21 PROCEDURE — 70450 CT HEAD/BRAIN W/O DYE: CPT

## 2020-06-21 PROCEDURE — 6360000002 HC RX W HCPCS: Performed by: EMERGENCY MEDICINE

## 2020-06-21 PROCEDURE — 80053 COMPREHEN METABOLIC PANEL: CPT

## 2020-06-21 PROCEDURE — 70498 CT ANGIOGRAPHY NECK: CPT

## 2020-06-21 PROCEDURE — 84484 ASSAY OF TROPONIN QUANT: CPT

## 2020-06-21 PROCEDURE — 99291 CRITICAL CARE FIRST HOUR: CPT | Performed by: PSYCHIATRY & NEUROLOGY

## 2020-06-21 PROCEDURE — 2580000003 HC RX 258: Performed by: INTERNAL MEDICINE

## 2020-06-21 PROCEDURE — APPSS180 APP SPLIT SHARED TIME > 60 MINUTES: Performed by: NURSE PRACTITIONER

## 2020-06-21 PROCEDURE — 85610 PROTHROMBIN TIME: CPT

## 2020-06-21 PROCEDURE — 85730 THROMBOPLASTIN TIME PARTIAL: CPT

## 2020-06-21 PROCEDURE — 70496 CT ANGIOGRAPHY HEAD: CPT

## 2020-06-21 PROCEDURE — 36415 COLL VENOUS BLD VENIPUNCTURE: CPT

## 2020-06-21 PROCEDURE — 6370000000 HC RX 637 (ALT 250 FOR IP): Performed by: NURSE PRACTITIONER

## 2020-06-21 PROCEDURE — 2580000003 HC RX 258: Performed by: EMERGENCY MEDICINE

## 2020-06-21 PROCEDURE — 82962 GLUCOSE BLOOD TEST: CPT

## 2020-06-21 PROCEDURE — 71045 X-RAY EXAM CHEST 1 VIEW: CPT

## 2020-06-21 PROCEDURE — 3E03317 INTRODUCTION OF OTHER THROMBOLYTIC INTO PERIPHERAL VEIN, PERCUTANEOUS APPROACH: ICD-10-PCS | Performed by: INTERNAL MEDICINE

## 2020-06-21 PROCEDURE — 99285 EMERGENCY DEPT VISIT HI MDM: CPT

## 2020-06-21 PROCEDURE — 2580000003 HC RX 258: Performed by: RADIOLOGY

## 2020-06-21 RX ORDER — HYDROCODONE BITARTRATE AND ACETAMINOPHEN 10; 325 MG/1; MG/1
1 TABLET ORAL EVERY 6 HOURS PRN
Status: DISCONTINUED | OUTPATIENT
Start: 2020-06-21 | End: 2020-06-21

## 2020-06-21 RX ORDER — ALBUTEROL SULFATE 2.5 MG/3ML
2.5 SOLUTION RESPIRATORY (INHALATION) EVERY 4 HOURS PRN
Status: DISCONTINUED | OUTPATIENT
Start: 2020-06-21 | End: 2020-06-24 | Stop reason: HOSPADM

## 2020-06-21 RX ORDER — SODIUM CHLORIDE 0.9 % (FLUSH) 0.9 %
10 SYRINGE (ML) INJECTION EVERY 12 HOURS SCHEDULED
Status: DISCONTINUED | OUTPATIENT
Start: 2020-06-21 | End: 2020-06-24 | Stop reason: HOSPADM

## 2020-06-21 RX ORDER — DEXTROSE MONOHYDRATE 25 G/50ML
12.5 INJECTION, SOLUTION INTRAVENOUS
Status: ACTIVE | OUTPATIENT
Start: 2020-06-21 | End: 2020-06-21

## 2020-06-21 RX ORDER — SODIUM CHLORIDE 0.9 % (FLUSH) 0.9 %
10 SYRINGE (ML) INJECTION PRN
Status: DISCONTINUED | OUTPATIENT
Start: 2020-06-21 | End: 2020-06-22

## 2020-06-21 RX ORDER — 0.9 % SODIUM CHLORIDE 0.9 %
1000 INTRAVENOUS SOLUTION INTRAVENOUS ONCE
Status: COMPLETED | OUTPATIENT
Start: 2020-06-21 | End: 2020-06-21

## 2020-06-21 RX ORDER — 0.9 % SODIUM CHLORIDE 0.9 %
50 INTRAVENOUS SOLUTION INTRAVENOUS ONCE
Status: COMPLETED | OUTPATIENT
Start: 2020-06-21 | End: 2020-06-21

## 2020-06-21 RX ORDER — CYCLOBENZAPRINE HCL 10 MG
10 TABLET ORAL 3 TIMES DAILY
Status: DISCONTINUED | OUTPATIENT
Start: 2020-06-21 | End: 2020-06-24 | Stop reason: HOSPADM

## 2020-06-21 RX ORDER — ACETAMINOPHEN 650 MG/1
650 SUPPOSITORY RECTAL EVERY 6 HOURS PRN
Status: DISCONTINUED | OUTPATIENT
Start: 2020-06-21 | End: 2020-06-21

## 2020-06-21 RX ORDER — ACETAMINOPHEN 325 MG/1
650 TABLET ORAL EVERY 6 HOURS PRN
Status: DISCONTINUED | OUTPATIENT
Start: 2020-06-21 | End: 2020-06-21

## 2020-06-21 RX ORDER — ONDANSETRON 2 MG/ML
4 INJECTION INTRAMUSCULAR; INTRAVENOUS EVERY 6 HOURS PRN
Status: DISCONTINUED | OUTPATIENT
Start: 2020-06-21 | End: 2020-06-24 | Stop reason: HOSPADM

## 2020-06-21 RX ORDER — SODIUM CHLORIDE 9 MG/ML
INJECTION, SOLUTION INTRAVENOUS CONTINUOUS
Status: DISCONTINUED | OUTPATIENT
Start: 2020-06-21 | End: 2020-06-21

## 2020-06-21 RX ORDER — NICOTINE 21 MG/24HR
1 PATCH, TRANSDERMAL 24 HOURS TRANSDERMAL DAILY
Status: DISCONTINUED | OUTPATIENT
Start: 2020-06-21 | End: 2020-06-24 | Stop reason: HOSPADM

## 2020-06-21 RX ORDER — CYCLOBENZAPRINE HCL 10 MG
10 TABLET ORAL 3 TIMES DAILY PRN
Status: DISCONTINUED | OUTPATIENT
Start: 2020-06-21 | End: 2020-06-21

## 2020-06-21 RX ORDER — PROMETHAZINE HYDROCHLORIDE 25 MG/1
12.5 TABLET ORAL EVERY 6 HOURS PRN
Status: DISCONTINUED | OUTPATIENT
Start: 2020-06-21 | End: 2020-06-24 | Stop reason: HOSPADM

## 2020-06-21 RX ORDER — SODIUM CHLORIDE 0.9 % (FLUSH) 0.9 %
10 SYRINGE (ML) INJECTION
Status: COMPLETED | OUTPATIENT
Start: 2020-06-21 | End: 2020-06-21

## 2020-06-21 RX ORDER — POLYETHYLENE GLYCOL 3350 17 G/17G
17 POWDER, FOR SOLUTION ORAL DAILY PRN
Status: DISCONTINUED | OUTPATIENT
Start: 2020-06-21 | End: 2020-06-24 | Stop reason: HOSPADM

## 2020-06-21 RX ORDER — SODIUM CHLORIDE 0.9 % (FLUSH) 0.9 %
10 SYRINGE (ML) INJECTION PRN
Status: DISCONTINUED | OUTPATIENT
Start: 2020-06-21 | End: 2020-06-24 | Stop reason: HOSPADM

## 2020-06-21 RX ORDER — SODIUM CHLORIDE 0.9 % (FLUSH) 0.9 %
10 SYRINGE (ML) INJECTION EVERY 12 HOURS SCHEDULED
Status: DISCONTINUED | OUTPATIENT
Start: 2020-06-21 | End: 2020-06-22

## 2020-06-21 RX ORDER — ACETAMINOPHEN 500 MG
1000 TABLET ORAL EVERY 12 HOURS
Status: DISCONTINUED | OUTPATIENT
Start: 2020-06-21 | End: 2020-06-24 | Stop reason: HOSPADM

## 2020-06-21 RX ADMIN — Medication 10 ML: at 09:06

## 2020-06-21 RX ADMIN — IOPAMIDOL 100 ML: 755 INJECTION, SOLUTION INTRAVENOUS at 09:06

## 2020-06-21 RX ADMIN — SODIUM CHLORIDE, PRESERVATIVE FREE 10 ML: 5 INJECTION INTRAVENOUS at 20:59

## 2020-06-21 RX ADMIN — ACETAMINOPHEN 1000 MG: 500 TABLET ORAL at 20:59

## 2020-06-21 RX ADMIN — CYCLOBENZAPRINE HYDROCHLORIDE 10 MG: 10 TABLET, FILM COATED ORAL at 20:59

## 2020-06-21 RX ADMIN — ALTEPLASE 6.1 MG: KIT at 09:20

## 2020-06-21 RX ADMIN — SODIUM CHLORIDE: 9 INJECTION, SOLUTION INTRAVENOUS at 09:31

## 2020-06-21 RX ADMIN — SODIUM CHLORIDE 1000 ML: 9 INJECTION, SOLUTION INTRAVENOUS at 08:40

## 2020-06-21 RX ADMIN — SODIUM CHLORIDE 50 ML: 9 INJECTION, SOLUTION INTRAVENOUS at 10:22

## 2020-06-21 ASSESSMENT — PAIN SCALES - GENERAL
PAINLEVEL_OUTOF10: 3
PAINLEVEL_OUTOF10: 6
PAINLEVEL_OUTOF10: 0

## 2020-06-21 ASSESSMENT — PAIN DESCRIPTION - PROGRESSION
CLINICAL_PROGRESSION: GRADUALLY WORSENING
CLINICAL_PROGRESSION: GRADUALLY WORSENING

## 2020-06-21 ASSESSMENT — PAIN DESCRIPTION - ONSET: ONSET: ON-GOING

## 2020-06-21 ASSESSMENT — PAIN DESCRIPTION - DESCRIPTORS
DESCRIPTORS: SPASM
DESCRIPTORS: CONSTANT;DISCOMFORT

## 2020-06-21 ASSESSMENT — PAIN DESCRIPTION - LOCATION
LOCATION: BACK;KNEE
LOCATION: BACK

## 2020-06-21 ASSESSMENT — PAIN - FUNCTIONAL ASSESSMENT: PAIN_FUNCTIONAL_ASSESSMENT: PREVENTS OR INTERFERES SOME ACTIVE ACTIVITIES AND ADLS

## 2020-06-21 ASSESSMENT — PAIN DESCRIPTION - FREQUENCY: FREQUENCY: CONTINUOUS

## 2020-06-21 ASSESSMENT — PAIN DESCRIPTION - PAIN TYPE: TYPE: CHRONIC PAIN

## 2020-06-21 NOTE — ED NOTES
This RN was sitting at charge desk when there was a thud sound and a yell. This RN and others went into the room to find the patient sitting on the floor. Pt states he was trying to use the urinal. Side rails were up x2, bed locked and in lowest position. Explained to the patient that he is on strict bedrest due to the medication the patient received with further education on CVA and TPA. Patient states he understood and apologized. Pt states that he did hit his head on the cabinet but did not pass out and feels fine. Pt has abrasion to left scapula. Dr. Michele Eason notified and new orders obtained.        Vivek Rogel, NICK  06/21/20 [id number]

## 2020-06-21 NOTE — PROGRESS NOTES
[facility]  Critical Care  Daily Progress Note 6/21/2020    Date of Admission: 6/21/20  CC:  Stroke s/p tPA. HOSPITAL EVENTS  6/21/20 Presented to ED via fire dept. Was driving by hospital has left sided weakness. NIHSS 10. Diagnostic workup  Revealed occlusion of left common carotid artery. tPA administered. Panfilo Reed out of bed when trying to reach bed side table. Admitted to NSICU. PHYSICAL EXAM:    BP (!) 146/88   Pulse 71   Temp 99.4 °F (37.4 °C) (Temporal)   Resp 23   Ht 5' 10\" (1.778 m)   Wt 150 lb (68 kg)   SpO2 95%   BMI 21.52 kg/m²     Intake/Output Summary (Last 24 hours) at 6/21/2020 1929  Last data filed at 6/21/2020 1900  Gross per 24 hour   Intake 340 ml   Output 850 ml   Net -510 ml       General appearance:  Comfortable. NEUROLOGIC:        GCS:    4 - Opens eyes on own   6 - Follows simple motor commands  5 - Alert and oriented       Pupil size:  Left 3 mm  Right 3 mm  Pupil reaction: Yes   PERRLA  Wiggles fingers: Left No Right Yes  Hand grasp:   Left absent     Right present  Wiggles toes: Left Yes    Right Yes  Plantar flexion: Left decreased    Right present  Facial droop:  Present on left  Speech:  clear      CONSTITUTIONAL: No acute distress  CARDIOVASCULAR: S1 S2, regular rate, regular rhythm,Monitor: SR  PULMONARY:  Respirations unlabored. No rhonchi/rales/wheezes. ABDOMEN: Soft, nontender, nondistended, nontympanic, normal bowel sounds. -  MUSCULOSKELETAL: left hemiparesis. SKIN/EXTREMITIES: No rashes/ecchymosis, no edema/clubbing, warm/dry, good capillary refill. IV ACCESS:   PIV      ASSESSMENT/PLAN:     Active Problems:    Stroke aborted by administration of thrombolytic agent [facility])  Resolved Problems:    * No resolved hospital problems. *    Neuro:  Stroke s/p tPA. Neurology following. Monitor neuro status. Stroke protocol. Stroke education. CT head   CV: No acute issues. SBP goal <180mm Hg. Pulm: No acute issues.   Current smoker est 2 packs per day. Nicoderm. Monitor respiratory status. GI: Passed bedside swallow. Diet. Monitor bowel function. Renal:  No acute issues. Monitor uop, renal function and electrolytes  ID: Leukocytosis. Low grade temp. Endocrine:  Hyperglycemia. Follow labs  MSK:. Left hemiparesis. PT/OT  Heme:No acute issues. Bowel regime: senna  Pain control/Sedation: Acetaminophen. Flexeril. DVT prophylaxis: SCDs. No lovenox dt tPA  GI prophylaxis: diet  Glucose protocol: Follow labs  Mouth/Eye care: as needed. Consults: Neurology. Medicine. Patient/Family update: Will update as family available  Code status:  Full      Disposition:  NSICU.         DOUGLAS Gordon-CNP  6/21/2020  7:25 PM

## 2020-06-21 NOTE — ED NOTES
Blue top obtained, labeled with patient optio, sent to lab in yellow tube for analysis per Stroke Protocol     Yessenia Gibson LPN  37/32/56 [id number]
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
## 2020-06-21 NOTE — PLAN OF CARE
Problem: COMMUNICATION IMPAIRMENT  Goal: Ability to express needs and understand communication  Outcome: Met This Shift

## 2020-06-21 NOTE — ED NOTES
Stroke Alert called at [phone]. Called CT and paged through perfect serve. Called Telestroke at [phone].        James Doyle  06/21/20 4655

## 2020-06-21 NOTE — ED NOTES
Pt resolving in CT, speech is clear, patient full ROM in left side.       Mayelin Baker RN  06/21/20 4131

## 2020-06-22 ENCOUNTER — APPOINTMENT (OUTPATIENT)
Dept: CT IMAGING | Age: 55
DRG: 045 | End: 2020-06-22
Payer: MEDICAID

## 2020-06-22 ENCOUNTER — APPOINTMENT (OUTPATIENT)
Dept: ULTRASOUND IMAGING | Age: 55
DRG: 045 | End: 2020-06-22
Payer: MEDICAID

## 2020-06-22 PROBLEM — Z86.73 H/O: CVA (CEREBROVASCULAR ACCIDENT): Status: ACTIVE | Noted: 2020-06-22

## 2020-06-22 PROBLEM — I65.22 INTERNAL CAROTID ARTERY OCCLUSION, LEFT: Status: ACTIVE | Noted: 2020-06-22

## 2020-06-22 PROBLEM — G83.24 PARALYSIS OF LEFT UPPER EXTREMITY (HCC): Status: ACTIVE | Noted: 2020-06-22

## 2020-06-22 PROBLEM — E78.5 HYPERLIPIDEMIA: Status: ACTIVE | Noted: 2020-06-22

## 2020-06-22 PROBLEM — R29.898 LEFT LEG WEAKNESS: Status: ACTIVE | Noted: 2020-06-22

## 2020-06-22 LAB
ANION GAP SERPL CALCULATED.3IONS-SCNC: 13 MMOL/L (ref 7–16)
BASOPHILS ABSOLUTE: 0.07 E9/L (ref 0–0.2)
BASOPHILS RELATIVE PERCENT: 0.5 % (ref 0–2)
BUN BLDV-MCNC: 6 MG/DL (ref 6–20)
CALCIUM SERPL-MCNC: 9.2 MG/DL (ref 8.6–10.2)
CHLORIDE BLD-SCNC: 108 MMOL/L (ref 98–107)
CHOLESTEROL, TOTAL: 220 MG/DL (ref 0–199)
CO2: 21 MMOL/L (ref 22–29)
CREAT SERPL-MCNC: 0.8 MG/DL (ref 0.7–1.2)
EKG ATRIAL RATE: 95 BPM
EKG P AXIS: 72 DEGREES
EKG P-R INTERVAL: 120 MS
EKG Q-T INTERVAL: 354 MS
EKG QRS DURATION: 92 MS
EKG QTC CALCULATION (BAZETT): 444 MS
EKG R AXIS: 80 DEGREES
EKG T AXIS: 61 DEGREES
EKG VENTRICULAR RATE: 95 BPM
EOSINOPHILS ABSOLUTE: 0.12 E9/L (ref 0.05–0.5)
EOSINOPHILS RELATIVE PERCENT: 0.9 % (ref 0–6)
GFR AFRICAN AMERICAN: >60
GFR NON-AFRICAN AMERICAN: >60 ML/MIN/1.73
GLUCOSE BLD-MCNC: 103 MG/DL (ref 74–99)
HBA1C MFR BLD: 5.3 % (ref 4–5.6)
HCT VFR BLD CALC: 46.1 % (ref 37–54)
HDLC SERPL-MCNC: 36 MG/DL
HEMOGLOBIN: 15.3 G/DL (ref 12.5–16.5)
IMMATURE GRANULOCYTES #: 0.05 E9/L
IMMATURE GRANULOCYTES %: 0.4 % (ref 0–5)
LDL CHOLESTEROL CALCULATED: 146 MG/DL (ref 0–99)
LYMPHOCYTES ABSOLUTE: 3.26 E9/L (ref 1.5–4)
LYMPHOCYTES RELATIVE PERCENT: 24.3 % (ref 20–42)
MCH RBC QN AUTO: 30.5 PG (ref 26–35)
MCHC RBC AUTO-ENTMCNC: 33.2 % (ref 32–34.5)
MCV RBC AUTO: 92 FL (ref 80–99.9)
METER GLUCOSE: 124 MG/DL (ref 74–99)
MONOCYTES ABSOLUTE: 1.05 E9/L (ref 0.1–0.95)
MONOCYTES RELATIVE PERCENT: 7.8 % (ref 2–12)
NEUTROPHILS ABSOLUTE: 8.85 E9/L (ref 1.8–7.3)
NEUTROPHILS RELATIVE PERCENT: 66.1 % (ref 43–80)
PDW BLD-RTO: 14 FL (ref 11.5–15)
PHOSPHORUS: 3.4 MG/DL (ref 2.5–4.5)
PLATELET # BLD: 504 E9/L (ref 130–450)
PMV BLD AUTO: 8.6 FL (ref 7–12)
POTASSIUM REFLEX MAGNESIUM: 4 MMOL/L (ref 3.5–5)
RBC # BLD: 5.01 E12/L (ref 3.8–5.8)
SODIUM BLD-SCNC: 142 MMOL/L (ref 132–146)
TRIGL SERPL-MCNC: 189 MG/DL (ref 0–149)
VLDLC SERPL CALC-MCNC: 38 MG/DL
WBC # BLD: 13.4 E9/L (ref 4.5–11.5)

## 2020-06-22 PROCEDURE — 97530 THERAPEUTIC ACTIVITIES: CPT

## 2020-06-22 PROCEDURE — 6370000000 HC RX 637 (ALT 250 FOR IP): Performed by: INTERNAL MEDICINE

## 2020-06-22 PROCEDURE — 36415 COLL VENOUS BLD VENIPUNCTURE: CPT

## 2020-06-22 PROCEDURE — 93010 ELECTROCARDIOGRAM REPORT: CPT | Performed by: INTERNAL MEDICINE

## 2020-06-22 PROCEDURE — 92523 SPEECH SOUND LANG COMPREHEN: CPT

## 2020-06-22 PROCEDURE — 84100 ASSAY OF PHOSPHORUS: CPT

## 2020-06-22 PROCEDURE — 80061 LIPID PANEL: CPT

## 2020-06-22 PROCEDURE — 85025 COMPLETE CBC W/AUTO DIFF WBC: CPT

## 2020-06-22 PROCEDURE — 6370000000 HC RX 637 (ALT 250 FOR IP): Performed by: NURSE PRACTITIONER

## 2020-06-22 PROCEDURE — 93880 EXTRACRANIAL BILAT STUDY: CPT

## 2020-06-22 PROCEDURE — 2580000003 HC RX 258: Performed by: INTERNAL MEDICINE

## 2020-06-22 PROCEDURE — 70450 CT HEAD/BRAIN W/O DYE: CPT

## 2020-06-22 PROCEDURE — 80048 BASIC METABOLIC PNL TOTAL CA: CPT

## 2020-06-22 PROCEDURE — 83036 HEMOGLOBIN GLYCOSYLATED A1C: CPT

## 2020-06-22 PROCEDURE — 99253 IP/OBS CNSLTJ NEW/EST LOW 45: CPT | Performed by: SURGERY

## 2020-06-22 PROCEDURE — 2060000000 HC ICU INTERMEDIATE R&B

## 2020-06-22 PROCEDURE — 97162 PT EVAL MOD COMPLEX 30 MIN: CPT

## 2020-06-22 PROCEDURE — APPSS30 APP SPLIT SHARED TIME 16-30 MINUTES: Performed by: NURSE PRACTITIONER

## 2020-06-22 RX ORDER — HYDROCODONE BITARTRATE AND ACETAMINOPHEN 10; 325 MG/1; MG/1
1 TABLET ORAL EVERY 6 HOURS PRN
Status: DISCONTINUED | OUTPATIENT
Start: 2020-06-22 | End: 2020-06-24 | Stop reason: HOSPADM

## 2020-06-22 RX ORDER — ATORVASTATIN CALCIUM 80 MG/1
80 TABLET, FILM COATED ORAL NIGHTLY
Status: DISCONTINUED | OUTPATIENT
Start: 2020-06-22 | End: 2020-06-24 | Stop reason: HOSPADM

## 2020-06-22 RX ORDER — LABETALOL HYDROCHLORIDE 5 MG/ML
10 INJECTION, SOLUTION INTRAVENOUS EVERY 10 MIN PRN
Status: DISCONTINUED | OUTPATIENT
Start: 2020-06-22 | End: 2020-06-23

## 2020-06-22 RX ORDER — HYDRALAZINE HYDROCHLORIDE 20 MG/ML
10 INJECTION INTRAMUSCULAR; INTRAVENOUS EVERY 10 MIN PRN
Status: DISCONTINUED | OUTPATIENT
Start: 2020-06-22 | End: 2020-06-23

## 2020-06-22 RX ADMIN — HYDROCODONE BITARTRATE AND ACETAMINOPHEN 1 TABLET: 10; 325 TABLET ORAL at 21:54

## 2020-06-22 RX ADMIN — Medication 10 ML: at 09:19

## 2020-06-22 RX ADMIN — ACETAMINOPHEN 1000 MG: 500 TABLET ORAL at 07:43

## 2020-06-22 RX ADMIN — ATORVASTATIN CALCIUM 80 MG: 80 TABLET, FILM COATED ORAL at 21:54

## 2020-06-22 RX ADMIN — CYCLOBENZAPRINE HYDROCHLORIDE 10 MG: 10 TABLET, FILM COATED ORAL at 20:25

## 2020-06-22 RX ADMIN — HYDROCODONE BITARTRATE AND ACETAMINOPHEN 1 TABLET: 10; 325 TABLET ORAL at 15:49

## 2020-06-22 ASSESSMENT — PAIN DESCRIPTION - LOCATION
LOCATION: BACK
LOCATION: BACK
LOCATION_2: HEAD
LOCATION: BACK
LOCATION_2: HEAD
LOCATION: BACK

## 2020-06-22 ASSESSMENT — PAIN SCALES - GENERAL
PAINLEVEL_OUTOF10: 8
PAINLEVEL_OUTOF10: 0
PAINLEVEL_OUTOF10: 8
PAINLEVEL_OUTOF10: 0
PAINLEVEL_OUTOF10: 8
PAINLEVEL_OUTOF10: 8
PAINLEVEL_OUTOF10: 0

## 2020-06-22 ASSESSMENT — PAIN DESCRIPTION - PROGRESSION
CLINICAL_PROGRESSION: NOT CHANGED
CLINICAL_PROGRESSION: GRADUALLY WORSENING
CLINICAL_PROGRESSION: GRADUALLY WORSENING
CLINICAL_PROGRESSION: NOT CHANGED
CLINICAL_PROGRESSION: GRADUALLY WORSENING
CLINICAL_PROGRESSION: GRADUALLY WORSENING
CLINICAL_PROGRESSION: NOT CHANGED

## 2020-06-22 ASSESSMENT — PAIN DESCRIPTION - FREQUENCY
FREQUENCY: CONTINUOUS

## 2020-06-22 ASSESSMENT — PAIN DESCRIPTION - ONSET
ONSET: ON-GOING

## 2020-06-22 ASSESSMENT — PAIN DESCRIPTION - DESCRIPTORS
DESCRIPTORS: SHARP;CRAMPING
DESCRIPTORS: SHARP;CRAMPING
DESCRIPTORS_2: HEADACHE
DESCRIPTORS: SHARP;CRAMPING
DESCRIPTORS_2: HEADACHE

## 2020-06-22 ASSESSMENT — PAIN DESCRIPTION - INTENSITY
RATING_2: 3
RATING_2: 3

## 2020-06-22 ASSESSMENT — PAIN DESCRIPTION - ORIENTATION
ORIENTATION_2: ANTERIOR;RIGHT
ORIENTATION_2: ANTERIOR;RIGHT

## 2020-06-22 ASSESSMENT — PAIN DESCRIPTION - PAIN TYPE
TYPE: CHRONIC PAIN
TYPE_2: ACUTE PAIN
TYPE: CHRONIC PAIN
TYPE_2: ACUTE PAIN
TYPE: CHRONIC PAIN
TYPE: CHRONIC PAIN

## 2020-06-22 ASSESSMENT — PAIN - FUNCTIONAL ASSESSMENT
PAIN_FUNCTIONAL_ASSESSMENT: PREVENTS OR INTERFERES SOME ACTIVE ACTIVITIES AND ADLS
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
## 2020-06-22 NOTE — PROGRESS NOTES
Consult received,cahrt reviewed. Discussed with Dr. Savanna Menchaca and patient has potential for ARU pending d/c support at home. Will see patient and review ARU program and check on d/c support at home. Thankyou.
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
## 2020-06-22 NOTE — PROGRESS NOTES
SPEECH/LANGUAGE PATHOLOGY  SPEECH/LANGUAGE/COGNITIVE EVALUATION      PATIENT NAME:  Kael Quiroz      :  1965          TODAY'S DATE:  2020 ROOM:  2370/7028-Z       ADMITTING DIAGNOSIS: Stroke aborted by administration of thrombolytic agent ([facility] [address] [I63.9]  Stroke aborted by administration of thrombolytic agent ([facility] [address] [I63.9]    SPEECH PATHOLOGY DIAGNOSIS:    Mild to moderate dysarthria     THERAPY RECOMMENDATIONS:   Speech Pathology intervention is recommended 3-6 times per week for LOS or when goals are met with emphasis on the following:     Increase oral motor range of motion for functional speech and eating tasks. Increase intelligibility and rate of speech to familiar and unfamiliar listeners. MOTOR SPEECH       Oral Peripheral Examination   Left labiobuccal weakness and Left lingual deviation     Parameters of Speech Production  Respiration:  Adequate for speech production  Articulation:  Distortion  Resonance:  Within functional limits  Quality:   Strained and Breathy  Pitch: Within functional limits  Intensity: Within functional limits  Fluency:  Intact  Prosody Intact    RECEPTIVE LANGUAGE    Comprehension of Yes/No Questions:    Within functional limits    Process  Simple Verbal Commands:   Within functional limits  Process Intermediate Verbal Commands:   Within functional limits  Process Complex Verbal Commands:     Within functional limits    Comprehension of Conversation:      Within functional limits      EXPRESSIVE LANGUAGE     Serials: Functional    Imitation:  Words   Functional   Sentences Functional    Naming:  (Modality used:  Verbal)  Confrontation Naming  Functional  Functional Description  Functional  Response Naming: Functional    Conversation:      Conversation was within functional limits    COGNITION     Attention/Orientation  Attention: Sustained attention   Orientation:  Oriented to Person, Place, Date, Reason for hospitalization    Memory   Immediate

## 2020-06-22 NOTE — PROGRESS NOTES
Dominance: Right [x]  Left []     ROM Strength STM goal: PRN   RUE  WFL 4/5      LUE PROM WFL; gross scapular mobilization: horizontal adduction and extension; associated tone in elbow/flexion; no active wrist/hand.   (+) response to digit/wrist extension with tapping. 2-/5 shoulder    0/5 elbow    0/5 forearm    0/5 wrist    0/5 hand    0/5 thumb Pt to demo G tolerance with ROM/ neuro re-ed technique to facilitate motor return. Pt to demo G knowledge of jt protection during ADLs and  transfers. Sensation: c/o numbness L hand; finger ID appropriate L hand. Tone: flaccid distal UE; mild flexor tone in elbow during yawn  Edema: [facility]     Functional Assessment   Initial Eval Status  Date: 6/22 Treatment Status  Date: STG=LTG  7-14  days    Feeding S; set up  Seated up in chair                                 Mod I  while seated up in chair to increase activity tolerance & functional reach. Grooming Mod A  Seated up in chair using R UE for function. Supervision   while seated & demonstrating G knowledge of compensatory techniques; using R UE as  functional assist.     UB dressing/bathing Max A                        Min A  demonstrating G initiation and follow through of otis dressing techniques and requiring min cues for L sided body awareness during tasks. LB dressing/bathing Mod A  Seated up in chair; mod cues for follow through of adapted technique. Min A for dynamic sitting balance. Min A   using AE as needed for safe reach/ energy conservation       Toileting NT                        Min A     Bed Mobility  Supine to sit: Mod A with min cues for technique    Sit to supine: NT                        Min A  in prep of ADL tasks & transfers   Functional Transfers Sit to stand:  Mod A    Stand to sit: Mod A                        Min A  sit<>stand/functional bathroom transfers using AD/DME as needed for balance and safety   Functional Mobility SPT:
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
## 2020-06-22 NOTE — PROGRESS NOTES
functional mobility practice will be used as well as appropriate assistive devices or modalities to obtain goals. Patient and family education will also be administered as needed. Frequency of treatments: 2-5x/week x 1-2 weeks. Time in  1000  Time out  1035    Total Treatment Time 25 minutes     Evaluation Time includes thorough review of current medical information, gathering information on past medical history/social history and prior level of function, completion of standardized testing/informal observation of tasks, assessment of data and education on plan of care and goals.     CPT codes:  [] Low Complexity PT evaluation 20834  [x] Moderate Complexity PT evaluation 48087  [] High Complexity PT evaluation 92909  [] PT Re-evaluation 59782  [] Gait training 50871 -- minutes  [] Manual therapy [id number] -- minutes  [x] Therapeutic activities 23049 25 minutes  [] Therapeutic exercises 86485 -- minutes  [] Neuromuscular reeducation 22333 -- minutes     Camille Matt, PT, DPT  [id number]

## 2020-06-22 NOTE — CONSULTS
Normal.  LUNGS:  Decreased breath sounds throughout. HEART:  Regular rate and rhythm. S1, S2 normal.  No murmurs or gallops. ABDOMEN:  Bowel sounds normal.  Soft, nontender. No masses or  organomegaly. EXTREMITIES:  Without clubbing, cyanosis or edema. MENTAL STATUS:  Alert and oriented. Sensation diminished on the left  side. NEUROMUSCULAR:  4/5 on the right. Left upper is flaccid. Left  lower is 3- at the hip and knee and 2- at the ankle. PROBLEM LIST:  1. 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 2.  Status post t-PA. 3.  Nicotine addiction. 4.  COPD. 5.  Chronic back pain. RECOMMENDATIONS:  The patient is going to require further rehab. He  would do well in acute rehab, but I have some concerns due to the lack  of family support. I think we need to look into that a little bit  further before making a final decision and attempting to pre-cert.         Azul Rain MD    D: 06/22/2020 12:32:06       T: 06/22/2020 12:35:51     [email or web address]  Job#: [id number]     Doc#: [id number]    CC:
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
## 2020-06-22 NOTE — CONSULTS
regular rate and regular rhythm  Extremities: no lower extremity edema, extremities atraumatic  Neurologic:    Mental Status: Alert, oriented, thought content appropriate    Speech: clear  Language: appropriate     Cranial Nerves:  I: smell NA   II: visual acuity  NA   II: visual fields Full    II: pupils KOMAL   III,VII: ptosis None   III,IV,VI: extraocular muscles  EOMI without nystagmus    V: mastication    V: facial light touch sensation  Normal   V,VII: corneal reflex     VII: facial muscle function - upper     VII: facial muscle function - lower Left droop   VIII: hearing Normal   IX: soft palate elevation  Normal   IX,X: gag reflex    XI: trapezius strength  5/5   XI: sternocleidomastoid strength 3/5   XI: neck extension strength  5/5   XII: tongue strength  Normal     Motor:  Right   5/5              Left   0/5               Right Bicep  5/5           Left Bicep  0/5              Right Triceps   5/5       Left Triceps  0/5          Right Deltoid  5/5     Left Deltoid  0/5         Right IPS  5/5            Left IPS  4/5               Right Quadriceps  5/5          Left Quadriceps    4/5           Right Gastrocnemius    5/5    Left Gastrocnemius   4/5  Right Ant Tibialis   5/5  Left Ant Tibialis   4/5         Sensory:  LT and PP normal      Coordination:   FN, FFM and BONNY normal  HS normal      DTR:     Right Brachioradialis reflex 2+  Left Brachioradialis reflex 2+  Right Biceps reflex NOT DONE  Left Biceps reflex NOT DONE  Right Triceps reflex NOT DONE  Left Triceps reflex NOT DONE  Right Quadriceps reflex 2+  Left Quadriceps reflex 2+  Right Achilles reflex 2+  Left Achilles reflex 2+            Laboratory/Radiology:     CBC with Differential:    Lab Results   Component Value Date    WBC 13.4 06/22/2020    RBC 5.01 06/22/2020    HGB 15.3 06/22/2020    HCT 46.1 06/22/2020     06/22/2020    MCV 92.0 06/22/2020    MCH 30.5 06/22/2020    MCHC 33.2 06/22/2020    RDW 14.0 06/22/2020    LYMPHOPCT

## 2020-06-23 ENCOUNTER — APPOINTMENT (OUTPATIENT)
Dept: MRI IMAGING | Age: 55
DRG: 045 | End: 2020-06-23
Payer: MEDICAID

## 2020-06-23 PROCEDURE — 99233 SBSQ HOSP IP/OBS HIGH 50: CPT | Performed by: CLINICAL NURSE SPECIALIST

## 2020-06-23 PROCEDURE — 70551 MRI BRAIN STEM W/O DYE: CPT

## 2020-06-23 PROCEDURE — 97116 GAIT TRAINING THERAPY: CPT | Performed by: PHYSICAL THERAPIST

## 2020-06-23 PROCEDURE — 6370000000 HC RX 637 (ALT 250 FOR IP): Performed by: INTERNAL MEDICINE

## 2020-06-23 PROCEDURE — 97530 THERAPEUTIC ACTIVITIES: CPT | Performed by: PHYSICAL THERAPIST

## 2020-06-23 PROCEDURE — 6370000000 HC RX 637 (ALT 250 FOR IP): Performed by: NURSE PRACTITIONER

## 2020-06-23 PROCEDURE — 97535 SELF CARE MNGMENT TRAINING: CPT

## 2020-06-23 PROCEDURE — 2580000003 HC RX 258: Performed by: NURSE PRACTITIONER

## 2020-06-23 PROCEDURE — 2060000000 HC ICU INTERMEDIATE R&B

## 2020-06-23 PROCEDURE — 97530 THERAPEUTIC ACTIVITIES: CPT

## 2020-06-23 PROCEDURE — 92526 ORAL FUNCTION THERAPY: CPT

## 2020-06-23 RX ORDER — CLOPIDOGREL BISULFATE 75 MG/1
75 TABLET ORAL DAILY
Status: DISCONTINUED | OUTPATIENT
Start: 2020-06-23 | End: 2020-06-24 | Stop reason: HOSPADM

## 2020-06-23 RX ORDER — ASPIRIN 81 MG/1
81 TABLET, CHEWABLE ORAL DAILY
Status: DISCONTINUED | OUTPATIENT
Start: 2020-06-23 | End: 2020-06-24 | Stop reason: HOSPADM

## 2020-06-23 RX ADMIN — SODIUM CHLORIDE, PRESERVATIVE FREE 10 ML: 5 INJECTION INTRAVENOUS at 08:49

## 2020-06-23 RX ADMIN — ASPIRIN 81 MG 81 MG: 81 TABLET ORAL at 08:48

## 2020-06-23 RX ADMIN — HYDROCODONE BITARTRATE AND ACETAMINOPHEN 1 TABLET: 10; 325 TABLET ORAL at 12:06

## 2020-06-23 RX ADMIN — HYDROCODONE BITARTRATE AND ACETAMINOPHEN 1 TABLET: 10; 325 TABLET ORAL at 18:43

## 2020-06-23 RX ADMIN — ATORVASTATIN CALCIUM 80 MG: 80 TABLET, FILM COATED ORAL at 19:59

## 2020-06-23 RX ADMIN — ACETAMINOPHEN 1000 MG: 500 TABLET ORAL at 08:48

## 2020-06-23 RX ADMIN — CLOPIDOGREL 75 MG: 75 TABLET, FILM COATED ORAL at 08:48

## 2020-06-23 RX ADMIN — CYCLOBENZAPRINE HYDROCHLORIDE 10 MG: 10 TABLET, FILM COATED ORAL at 19:57

## 2020-06-23 RX ADMIN — HYDROCODONE BITARTRATE AND ACETAMINOPHEN 1 TABLET: 10; 325 TABLET ORAL at 05:25

## 2020-06-23 RX ADMIN — SODIUM CHLORIDE, PRESERVATIVE FREE 10 ML: 5 INJECTION INTRAVENOUS at 20:02

## 2020-06-23 RX ADMIN — ACETAMINOPHEN 1000 MG: 500 TABLET ORAL at 19:57

## 2020-06-23 ASSESSMENT — PAIN DESCRIPTION - PAIN TYPE: TYPE: CHRONIC PAIN

## 2020-06-23 ASSESSMENT — PAIN SCALES - GENERAL
PAINLEVEL_OUTOF10: 8
PAINLEVEL_OUTOF10: 8
PAINLEVEL_OUTOF10: 5
PAINLEVEL_OUTOF10: 7
PAINLEVEL_OUTOF10: 8
PAINLEVEL_OUTOF10: 7
PAINLEVEL_OUTOF10: 5

## 2020-06-23 ASSESSMENT — PAIN DESCRIPTION - LOCATION: LOCATION: BACK

## 2020-06-23 NOTE — PRE-CERTIFICATION NOTE
Precertification initiated and clinicals faxed to [facility]. Will advise when determination is made.

## 2020-06-23 NOTE — PROGRESS NOTES
accident)    Internal carotid artery occlusion, left    Paralysis of left upper extremity (HCC)    Left leg weakness  Resolved Problems:    * No resolved hospital problems. *     Start aspirin and Plavix. Echo pending. MRI pending. Discussed with vascular. No need for acute intervention on the left carotid. Thought to be chronic occlusion rather than acute thrombosis. Continue statin.       Nicotine patch    COPD stable    Requires continued inpatient level of care   Arash Rose Smoker    1:23 PM  6/23/2020  Cell: [phone]

## 2020-06-23 NOTE — CARE COORDINATION
Spoke with Jenni Reis at [facility]. They have accepted. They will initiate precert, await auth.

## 2020-06-23 NOTE — PROGRESS NOTES
Pt assisted to chair for breakfast, ate in chair, bed changed, pt assisted back to bed for comfort, pt stated his back feels so relief sitting in chair, encouraged pt he can sit in chair as much as he would like, to use call light for assistance and staff will help him into chair
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
## 2020-06-24 ENCOUNTER — HOSPITAL ENCOUNTER (INPATIENT)
Age: 55
LOS: 7 days | Discharge: HOME | DRG: 058 | End: 2020-07-01
Attending: PHYSICAL MEDICINE & REHABILITATION | Admitting: PHYSICAL MEDICINE & REHABILITATION
Payer: MEDICAID

## 2020-06-24 VITALS
HEIGHT: 70 IN | HEART RATE: 80 BPM | BODY MASS INDEX: 21.47 KG/M2 | RESPIRATION RATE: 26 BRPM | WEIGHT: 150 LBS | DIASTOLIC BLOOD PRESSURE: 90 MMHG | SYSTOLIC BLOOD PRESSURE: 144 MMHG | OXYGEN SATURATION: 95 % | TEMPERATURE: 98 F

## 2020-06-24 PROBLEM — I63.9 INFARCTION OF RIGHT BASAL GANGLIA (HCC): Status: ACTIVE | Noted: 2020-06-24

## 2020-06-24 PROBLEM — I63.9 ACUTE CVA (CEREBROVASCULAR ACCIDENT) (HCC): Status: ACTIVE | Noted: 2020-06-24

## 2020-06-24 LAB
LV EF: 60 %
LVEF MODALITY: NORMAL

## 2020-06-24 PROCEDURE — 6370000000 HC RX 637 (ALT 250 FOR IP): Performed by: INTERNAL MEDICINE

## 2020-06-24 PROCEDURE — 99232 SBSQ HOSP IP/OBS MODERATE 35: CPT | Performed by: SURGERY

## 2020-06-24 PROCEDURE — 2580000003 HC RX 258: Performed by: NURSE PRACTITIONER

## 2020-06-24 PROCEDURE — 6370000000 HC RX 637 (ALT 250 FOR IP): Performed by: NURSE PRACTITIONER

## 2020-06-24 PROCEDURE — 92526 ORAL FUNCTION THERAPY: CPT

## 2020-06-24 PROCEDURE — 93306 TTE W/DOPPLER COMPLETE: CPT

## 2020-06-24 PROCEDURE — 99232 SBSQ HOSP IP/OBS MODERATE 35: CPT | Performed by: CLINICAL NURSE SPECIALIST

## 2020-06-24 PROCEDURE — 1280000000 HC REHAB R&B

## 2020-06-24 RX ORDER — ACETAMINOPHEN 500 MG
1000 TABLET ORAL EVERY 12 HOURS
Status: DISCONTINUED | OUTPATIENT
Start: 2020-06-24 | End: 2020-07-01 | Stop reason: HOSPADM

## 2020-06-24 RX ORDER — POLYETHYLENE GLYCOL 3350 17 G/17G
17 POWDER, FOR SOLUTION ORAL DAILY PRN
Status: DISCONTINUED | OUTPATIENT
Start: 2020-06-24 | End: 2020-06-24 | Stop reason: SDUPTHER

## 2020-06-24 RX ORDER — CYCLOBENZAPRINE HCL 10 MG
10 TABLET ORAL 3 TIMES DAILY
Status: CANCELLED | OUTPATIENT
Start: 2020-06-24

## 2020-06-24 RX ORDER — NICOTINE 21 MG/24HR
1 PATCH, TRANSDERMAL 24 HOURS TRANSDERMAL DAILY
Status: DISCONTINUED | OUTPATIENT
Start: 2020-06-25 | End: 2020-06-30

## 2020-06-24 RX ORDER — ALBUTEROL SULFATE 2.5 MG/3ML
2.5 SOLUTION RESPIRATORY (INHALATION) EVERY 4 HOURS PRN
Status: DISCONTINUED | OUTPATIENT
Start: 2020-06-24 | End: 2020-07-01 | Stop reason: HOSPADM

## 2020-06-24 RX ORDER — ASPIRIN 81 MG/1
81 TABLET, CHEWABLE ORAL DAILY
Status: DISCONTINUED | OUTPATIENT
Start: 2020-06-25 | End: 2020-07-01 | Stop reason: HOSPADM

## 2020-06-24 RX ORDER — ACETAMINOPHEN 500 MG
1000 TABLET ORAL EVERY 12 HOURS
Status: CANCELLED | OUTPATIENT
Start: 2020-06-24

## 2020-06-24 RX ORDER — CYCLOBENZAPRINE HCL 10 MG
10 TABLET ORAL 3 TIMES DAILY
Status: DISCONTINUED | OUTPATIENT
Start: 2020-06-24 | End: 2020-06-26

## 2020-06-24 RX ORDER — CLOPIDOGREL BISULFATE 75 MG/1
75 TABLET ORAL DAILY
Status: CANCELLED | OUTPATIENT
Start: 2020-06-25

## 2020-06-24 RX ORDER — ALBUTEROL SULFATE 2.5 MG/3ML
2.5 SOLUTION RESPIRATORY (INHALATION) EVERY 4 HOURS PRN
Status: CANCELLED | OUTPATIENT
Start: 2020-06-24

## 2020-06-24 RX ORDER — HYDROCODONE BITARTRATE AND ACETAMINOPHEN 10; 325 MG/1; MG/1
1 TABLET ORAL EVERY 6 HOURS PRN
Status: DISCONTINUED | OUTPATIENT
Start: 2020-06-24 | End: 2020-07-01 | Stop reason: HOSPADM

## 2020-06-24 RX ORDER — ATORVASTATIN CALCIUM 80 MG/1
80 TABLET, FILM COATED ORAL NIGHTLY
Status: DISCONTINUED | OUTPATIENT
Start: 2020-06-24 | End: 2020-07-01 | Stop reason: HOSPADM

## 2020-06-24 RX ORDER — POLYETHYLENE GLYCOL 3350 17 G/17G
17 POWDER, FOR SOLUTION ORAL DAILY PRN
Status: CANCELLED | OUTPATIENT
Start: 2020-06-24

## 2020-06-24 RX ORDER — POLYETHYLENE GLYCOL 3350 17 G/17G
17 POWDER, FOR SOLUTION ORAL DAILY PRN
Status: DISCONTINUED | OUTPATIENT
Start: 2020-06-24 | End: 2020-07-01 | Stop reason: HOSPADM

## 2020-06-24 RX ORDER — NICOTINE 21 MG/24HR
1 PATCH, TRANSDERMAL 24 HOURS TRANSDERMAL DAILY
Status: CANCELLED | OUTPATIENT
Start: 2020-06-25

## 2020-06-24 RX ORDER — CLOPIDOGREL BISULFATE 75 MG/1
75 TABLET ORAL DAILY
Status: DISCONTINUED | OUTPATIENT
Start: 2020-06-25 | End: 2020-07-01 | Stop reason: HOSPADM

## 2020-06-24 RX ORDER — ACETAMINOPHEN 325 MG/1
650 TABLET ORAL EVERY 4 HOURS PRN
Status: DISCONTINUED | OUTPATIENT
Start: 2020-06-24 | End: 2020-07-01 | Stop reason: HOSPADM

## 2020-06-24 RX ORDER — ATORVASTATIN CALCIUM 80 MG/1
80 TABLET, FILM COATED ORAL NIGHTLY
Status: CANCELLED | OUTPATIENT
Start: 2020-06-24

## 2020-06-24 RX ORDER — ASPIRIN 81 MG/1
81 TABLET, CHEWABLE ORAL DAILY
Status: CANCELLED | OUTPATIENT
Start: 2020-06-25

## 2020-06-24 RX ORDER — HYDROCODONE BITARTRATE AND ACETAMINOPHEN 10; 325 MG/1; MG/1
1 TABLET ORAL EVERY 6 HOURS PRN
Status: CANCELLED | OUTPATIENT
Start: 2020-06-24

## 2020-06-24 RX ADMIN — HYDROCODONE BITARTRATE AND ACETAMINOPHEN 1 TABLET: 10; 325 TABLET ORAL at 08:30

## 2020-06-24 RX ADMIN — ACETAMINOPHEN 1000 MG: 500 TABLET ORAL at 21:40

## 2020-06-24 RX ADMIN — SODIUM CHLORIDE, PRESERVATIVE FREE 10 ML: 5 INJECTION INTRAVENOUS at 08:31

## 2020-06-24 RX ADMIN — CYCLOBENZAPRINE 10 MG: 10 TABLET, FILM COATED ORAL at 21:40

## 2020-06-24 RX ADMIN — CLOPIDOGREL 75 MG: 75 TABLET, FILM COATED ORAL at 08:30

## 2020-06-24 RX ADMIN — ASPIRIN 81 MG 81 MG: 81 TABLET ORAL at 08:29

## 2020-06-24 RX ADMIN — HYDROCODONE BITARTRATE AND ACETAMINOPHEN 1 TABLET: 10; 325 TABLET ORAL at 14:38

## 2020-06-24 RX ADMIN — HYDROCODONE BITARTRATE AND ACETAMINOPHEN 1 TABLET: 10; 325 TABLET ORAL at 21:40

## 2020-06-24 RX ADMIN — ATORVASTATIN CALCIUM 80 MG: 80 TABLET, FILM COATED ORAL at 21:40

## 2020-06-24 ASSESSMENT — PAIN SCALES - GENERAL
PAINLEVEL_OUTOF10: 8
PAINLEVEL_OUTOF10: 0
PAINLEVEL_OUTOF10: 7
PAINLEVEL_OUTOF10: 8

## 2020-06-24 ASSESSMENT — PAIN DESCRIPTION - LOCATION: LOCATION: BACK;KNEE

## 2020-06-24 ASSESSMENT — PAIN DESCRIPTION - PAIN TYPE: TYPE: CHRONIC PAIN

## 2020-06-24 NOTE — LETTER
PORTABLE PATIENT PROFILE  Dania Otoole  [id number]    MEDICAL DIAGNOSIS/CONDITION:   Patient Active Problem List   Diagnosis    Primary osteoarthritis involving multiple joints    Tobacco abuse    Chronic bilateral low back pain with sciatica    Chronic pain of both knees    Closed nondisplaced fracture of phalanx of left thumb with routine healing    Contusion of rib on left side    Centrilobular emphysema (HCC)    Lumbar spondylosis    Stroke aborted by administration of thrombolytic agent ([address]    Hyperlipidemia    H/O: CVA (cerebrovascular accident)    Internal carotid artery occlusion, left    Paralysis of left upper extremity (HCC)    Left leg weakness    Stroke-like symptom    Infarction of right basal ganglia (HCC)    Acute CVA (cerebrovascular accident) ([address]       INSURANCE INFORMATION:  Payor: [employer] [place] /  /  /     ADVANCED DIRECTIVES:   Advance Directives (For Healthcare)  Healthcare Directive: No, patient does not have an advance directive for healthcare treatment  Information on Healthcare Directives Requested: Yes  [unfilled]     EMERGENCY CONTACT:       RISK FACTORS:   Social History     Tobacco Use    Smoking status: Current Every Day Smoker     Packs/day: 1.00     Types: Cigarettes     Start date: 1/1/1983    Smokeless tobacco: Never Used    Tobacco comment: smokes 1-1.5 ppd   Substance Use Topics    Alcohol use: No     Comment: Recovering alcoholic 83/76/7555       ALLERGIES:  No Known Allergies    IMMUNIZATIONS:    There is no immunization history on file for this patient. SWALLOWING:   Difficulty Chewing or Swallowing Food: No    VISION AND HEARING:   Sensory Problems  Visual impairment: Glasses  Hearing impairment: Hard of hearing    PHYSICIANS INVOLVED WITH CARE:    Ry Tineo MD  No ref.  provider found  Juan Luis Lindsay MD
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
## 2020-06-24 NOTE — DISCHARGE INSTR - COC
Care:99182} for {GREATER/LESS:776526680} 30 days.      Update Admission H&P: [id number]}    PHYSICIAN SIGNATURE:  {Esignature:[id number]}

## 2020-06-24 NOTE — DISCHARGE SUMMARY
1,000 mg, 1,000 mg, Oral, Q12H, Fany Smithcoretta, APRN - CNS, 1,000 mg at 06/23/20 2652     Activity: activity as tolerated  Diet: regular diet    Follow-up with PCP in 1 week.     Note that over 30 minutes was spent in preparing discharge papers, discussing discharge with patient, medication review, etc.    Signed:  Norma     6/24/2020  5:22 PM

## 2020-06-25 LAB
ANION GAP SERPL CALCULATED.3IONS-SCNC: 12 MMOL/L (ref 7–16)
BASOPHILS ABSOLUTE: 0.06 E9/L (ref 0–0.2)
BASOPHILS RELATIVE PERCENT: 0.4 % (ref 0–2)
BUN BLDV-MCNC: 9 MG/DL (ref 6–20)
CALCIUM SERPL-MCNC: 9.1 MG/DL (ref 8.6–10.2)
CHLORIDE BLD-SCNC: 103 MMOL/L (ref 98–107)
CO2: 27 MMOL/L (ref 22–29)
CREAT SERPL-MCNC: 0.9 MG/DL (ref 0.7–1.2)
EOSINOPHILS ABSOLUTE: 0.2 E9/L (ref 0.05–0.5)
EOSINOPHILS RELATIVE PERCENT: 1.3 % (ref 0–6)
GFR AFRICAN AMERICAN: >60
GFR NON-AFRICAN AMERICAN: >60 ML/MIN/1.73
GLUCOSE BLD-MCNC: 97 MG/DL (ref 74–99)
HCT VFR BLD CALC: 45.4 % (ref 37–54)
HEMOGLOBIN: 15 G/DL (ref 12.5–16.5)
IMMATURE GRANULOCYTES #: 0.08 E9/L
IMMATURE GRANULOCYTES %: 0.5 % (ref 0–5)
LYMPHOCYTES ABSOLUTE: 2.99 E9/L (ref 1.5–4)
LYMPHOCYTES RELATIVE PERCENT: 19.9 % (ref 20–42)
MCH RBC QN AUTO: 30.8 PG (ref 26–35)
MCHC RBC AUTO-ENTMCNC: 33 % (ref 32–34.5)
MCV RBC AUTO: 93.2 FL (ref 80–99.9)
MONOCYTES ABSOLUTE: 1.16 E9/L (ref 0.1–0.95)
MONOCYTES RELATIVE PERCENT: 7.7 % (ref 2–12)
NEUTROPHILS ABSOLUTE: 10.5 E9/L (ref 1.8–7.3)
NEUTROPHILS RELATIVE PERCENT: 70.2 % (ref 43–80)
PDW BLD-RTO: 13.9 FL (ref 11.5–15)
PLATELET # BLD: 516 E9/L (ref 130–450)
PMV BLD AUTO: 8.8 FL (ref 7–12)
POTASSIUM REFLEX MAGNESIUM: 4.1 MMOL/L (ref 3.5–5)
RBC # BLD: 4.87 E12/L (ref 3.8–5.8)
SODIUM BLD-SCNC: 142 MMOL/L (ref 132–146)
WBC # BLD: 15 E9/L (ref 4.5–11.5)

## 2020-06-25 PROCEDURE — 97530 THERAPEUTIC ACTIVITIES: CPT

## 2020-06-25 PROCEDURE — 97162 PT EVAL MOD COMPLEX 30 MIN: CPT

## 2020-06-25 PROCEDURE — 85025 COMPLETE CBC W/AUTO DIFF WBC: CPT

## 2020-06-25 PROCEDURE — 36415 COLL VENOUS BLD VENIPUNCTURE: CPT

## 2020-06-25 PROCEDURE — 97110 THERAPEUTIC EXERCISES: CPT

## 2020-06-25 PROCEDURE — 80048 BASIC METABOLIC PNL TOTAL CA: CPT

## 2020-06-25 PROCEDURE — 1280000000 HC REHAB R&B

## 2020-06-25 PROCEDURE — 92610 EVALUATE SWALLOWING FUNCTION: CPT

## 2020-06-25 PROCEDURE — 6370000000 HC RX 637 (ALT 250 FOR IP): Performed by: INTERNAL MEDICINE

## 2020-06-25 PROCEDURE — 6370000000 HC RX 637 (ALT 250 FOR IP): Performed by: PHYSICAL MEDICINE & REHABILITATION

## 2020-06-25 PROCEDURE — 97112 NEUROMUSCULAR REEDUCATION: CPT

## 2020-06-25 PROCEDURE — 92523 SPEECH SOUND LANG COMPREHEN: CPT

## 2020-06-25 PROCEDURE — 97535 SELF CARE MNGMENT TRAINING: CPT

## 2020-06-25 PROCEDURE — 92507 TX SP LANG VOICE COMM INDIV: CPT

## 2020-06-25 PROCEDURE — 97166 OT EVAL MOD COMPLEX 45 MIN: CPT

## 2020-06-25 RX ADMIN — ACETAMINOPHEN 1000 MG: 500 TABLET ORAL at 08:43

## 2020-06-25 RX ADMIN — ASPIRIN 81 MG 81 MG: 81 TABLET ORAL at 08:44

## 2020-06-25 RX ADMIN — ATORVASTATIN CALCIUM 80 MG: 80 TABLET, FILM COATED ORAL at 22:59

## 2020-06-25 RX ADMIN — HYDROCODONE BITARTRATE AND ACETAMINOPHEN 1 TABLET: 10; 325 TABLET ORAL at 08:42

## 2020-06-25 RX ADMIN — HYDROCODONE BITARTRATE AND ACETAMINOPHEN 1 TABLET: 10; 325 TABLET ORAL at 20:55

## 2020-06-25 RX ADMIN — ACETAMINOPHEN 650 MG: 325 TABLET ORAL at 19:12

## 2020-06-25 RX ADMIN — ACETAMINOPHEN 1000 MG: 500 TABLET ORAL at 20:56

## 2020-06-25 RX ADMIN — CYCLOBENZAPRINE 10 MG: 10 TABLET, FILM COATED ORAL at 14:21

## 2020-06-25 RX ADMIN — CYCLOBENZAPRINE 10 MG: 10 TABLET, FILM COATED ORAL at 20:56

## 2020-06-25 RX ADMIN — CLOPIDOGREL 75 MG: 75 TABLET, FILM COATED ORAL at 08:44

## 2020-06-25 RX ADMIN — HYDROCODONE BITARTRATE AND ACETAMINOPHEN 1 TABLET: 10; 325 TABLET ORAL at 14:21

## 2020-06-25 ASSESSMENT — PAIN SCALES - GENERAL
PAINLEVEL_OUTOF10: 7
PAINLEVEL_OUTOF10: 5
PAINLEVEL_OUTOF10: 8
PAINLEVEL_OUTOF10: 8
PAINLEVEL_OUTOF10: 0
PAINLEVEL_OUTOF10: 7
PAINLEVEL_OUTOF10: 3
PAINLEVEL_OUTOF10: 0

## 2020-06-25 ASSESSMENT — PAIN DESCRIPTION - PROGRESSION: CLINICAL_PROGRESSION: NOT CHANGED

## 2020-06-25 NOTE — PROGRESS NOTES
SPEECH/LANGUAGE PATHOLOGY  CLINICAL ASSESSMENT OF SWALLOWING FUNCTION    PATIENT NAME:  Eliz Laboy      :  1965      TODAY'S DATE:  2020  ROOM:  15/[facility]    SUMMARY OF EVALUATION     DYSPHAGIA DIAGNOSIS:  Minimal/resolving oropharyngeal dysphagia      DIET RECOMMENDATIONS:  Regular consistency solids with  thin liquids     FEEDING RECOMMENDATIONS:     Assistance level:  Set-up is required for all oral intake      Compensatory strategies recommended: Small bites/sips and No straw    THERAPY RECOMMENDATIONS:      Dysphagia therapy is not recommended                  PROCEDURE     Consistencies Administered During the Evaluation   Liquids: thin liquid   Solids:  pureed foods, soft solid foods and solid foods      Method of Intake:   cup, spoon/fork  Self fed      Position:   Seated, upright                  RESULTS     Oral Stage:         Increased time necessary for mastication of bolus. Pharyngeal Stage:      Cough x1 with large presentation of mixed greens/salad during mealtime assessment. Patient advised to take small bites/sips. No additional difficulties observed. The Speech Language Pathologist (SLP) completed education with the patient regarding results of evaluation. Explained that Speech Pathology intervention is not warranted  at this time   Prognosis for improvements is n/a  This plan will be re-evaluated and revised in n/a  if warranted. Patient stated goals: n/a  Treatment goals discussed with n/a  The Patient understand(s) the diagnosis, prognosis and plan of care       CPT code:  76450  bedside swallow eval      [x]The admitting diagnosis and active problem list, as listed below have been reviewed prior to initiation of this evaluation.      ADMITTING DIAGNOSIS: Acute CVA (cerebrovascular accident) [facility]) [I63.9]     ACTIVE PROBLEM LIST:   Patient Active Problem List   Diagnosis    Primary osteoarthritis involving multiple joints    Tobacco abuse    Chronic bilateral low back pain with sciatica    Chronic pain of both knees    Closed nondisplaced fracture of phalanx of left thumb with routine healing    Contusion of rib on left side    Centrilobular emphysema (HCC)    Lumbar spondylosis    Stroke aborted by administration of thrombolytic agent ([facility]    Hyperlipidemia    H/O: CVA (cerebrovascular accident)    Internal carotid artery occlusion, left    Paralysis of left upper extremity (HCC)    Left leg weakness    Stroke-like symptom    Infarction of right basal ganglia (HCC)    Acute CVA (cerebrovascular accident) ([facility]

## 2020-06-25 NOTE — PROGRESS NOTES
Speech Language Pathology  ACUTE REHABILITATION--DAILY PROGRESS NOTE            ADMITTING DIAGNOSIS: Acute CVA (cerebrovascular accident) ([facility] [I63.9]     SPEECH PATHOLOGY DIAGNOSIS:    Mild/resolving dysarthria with minimal left labiobuccal weakness. Functional cognitive-linguistic skills for verbal tasks; however, impulsivity reported with execution of functionally-based motor tasks.     THERAPY RECOMMENDATIONS:   Speech Pathology intervention is recommended 3-6 times per week for LOS or when goals are met with emphasis on the following:      Increase speech intelligibility by               -- improving strength/ROM of the facial and lingual musculature to facilitate and sustain accuracy of articulator placement. -- articulation drill training to promote precision of phoneme production at the word and sentence levels (goal met given 90% accuracy of production with unknown context). Identify physical/cognitive/visuospatial impairments, verbalize impact each have on overall level of functional independence, and demonstrate appropriate insight/safety precautions given these limitations with >90% of trials.                                                                                                                                         FIMS SCORES                            Swallowing                          Current Status             6--Modified Independent                       Short Term Goal         6--Modified Independent                       Long Term Goal          6--Modified Independent              Receptive                          Current Status             7--Independent                         Short Term Goal         7--Independent                         Long Term Goal          7--Independent                Expressive                          Current Status             6--Modified Independent                       Short Term Goal         7--Independent Long Term Goal          7--Independent                Social Interaction                          Current Status             7--Independent                         Short Term Goal         7--Independent                         Long Term Goal          7--Independent                                                               Problem Solving                          Current Status             5--Supervision               Short Term Goal         6--Modified Independent                       Long Term Goal          6--Modified Independent                          Memory                          Current Status             5--Supervision               Short Term Goal         6--Modified Independent                       Long Term Goal          6--Modified Independent                                                      SWALLOWING:      Diet:  Regular consistency solids with thin liquids     Minimal/resolving oropharyngeal dysphagia. Patient verbalizes good awareness of compensatory strategies (small bites/sips and no straw). Set-up is required for all oral intake. Patient prefers to eat in dining room; although, supervision for all mealtimes is not required. LANGUAGE:     Chuckie Diamond was within functional limits. COGNITION:       Functional cognition for verbal tasks. Safety: To be assessed. Impulsivity reported by staff. SPEECH:     Pt self reported that speech was different from before his stroke. Pt reported that people have difficulty understanding him when he speaks too fast. Slurred quality noted during conversational speech. Pt completed articulation exercises with prompts to over-enunciate his speech. This approach increased intelligibility and naturally slowed down his speech when reading sentences. Pt was instructed to complete articulation exercises and oral  motor exercises independently.      SP recommended after discharge:  TBD    Will continue SP intervention as per previously established POC.     Minute Tracking:    Individual therapy:   30 minutes  Concurrent therapy:    0 minutes  Group therapy:     0 minutes  Co-treatment therapy:    0 minutes    Total minutes for 6/25/2020: 30 minutes      Noel Gomes. speech pathology graduate extern

## 2020-06-25 NOTE — PROGRESS NOTES
SPEECH/LANGUAGE PATHOLOGY  SPEECH/LANGUAGE/COGNITIVE EVALUATION      PATIENT NAME:  Jai Sanchez      :  1965         TODAY'S DATE:  2020 ROOM:  9805/9692-U     ADMITTING DIAGNOSIS: Acute CVA (cerebrovascular accident) ([facility] [I63.9]    SPEECH PATHOLOGY DIAGNOSIS:    Mild/resolving dysarthria with minimal left labiobuccal weakness. Functional cognitive-linguistic skills for verbal tasks; however, impulsivity reported with execution of functionally-based motor tasks. THERAPY RECOMMENDATIONS:   Speech Pathology intervention is recommended 3-6 times per week for LOS or when goals are met with emphasis on the following:     Increase speech intelligibility by    -- improving strength/ROM of the facial and lingual musculature to facilitate and sustain accuracy of articulator placement. -- articulation drill training to promote precision of phoneme production at the word and sentence levels (goal met given 90% accuracy of production with unknown context). Identify physical/cognitive/visuospatial impairments, verbalize impact each have on overall level of functional independence, and demonstrate appropriate insight/safety precautions given these limitations with >90% of trials.                FIMS SCORES      Swallowing    Current Status  6--Modified Independent    Short Term Goal 6--Modified Independent    Long Term Goal 6--Modified Independent     Receptive    Current Status  7--Independent    Short Term Goal 7--Independent    Long Term Goal 7--Independent     Expressive    Current Status  6--Modified Independent    Short Term Goal 7--Independent    Long Term Goal 7--Independent     Social Interaction    Current Status  7--Independent    Short Term Goal 7--Independent    Long Term Goal 7--Independent         Problem Solving    Current Status  5--Supervision    Short Term Goal 6--Modified Independent    Long Term Goal 6--Modified Independent      Memory    Current Status  5--Supervision    Short Term Goal 6--Modified Independent    Long Term Goal 6--Modified Independent         MOTOR SPEECH       Oral Peripheral Examination   Left labiobuccal weakness    Parameters of Speech Production  Respiration:  Adequate for speech production  Articulation:  Distortion  Resonance:  Within functional limits  Quality:   Within functional limits  Pitch: Within functional limits  Intensity: Within functional limits  Fluency:  Intact  Prosody Intact    RECEPTIVE LANGUAGE    Comprehension of Yes/No Questions: Within functional limits    Process  Simple Verbal Commands:   Within functional limits  Process Intermediate Verbal Commands:   Within functional limits  Process Complex Verbal Commands:     Within functional limits    Comprehension of Conversation:      Within functional limits      EXPRESSIVE LANGUAGE     Serials: Functional    Imitation:  Words   Functional   Sentences Functional    Naming:  (Modality used:  Verbal)  Confrontation Naming  Functional  Functional Description  Functional  Response Naming: Functional    Conversation:      Conversation was within functional limits. Decreased intelligibility at times due to fast rate and mild distortions. COGNITION     Attention/Orientation  Attention: Sustained attention   Orientation:   Person:  oriented    Place:  oriented    Year:  correct    Month:  accurate within 5 days    Day of Week:  correct    Situation:  accurately described    Memory   Long Term Recall: Address:  recalled without cuing  Birthdate:  recalled without cuing  Age: recalled without cuing  Family: recalled without cuing    Immediate Recall: Sock:  recalled accurately     Blue:  recalled accurately     Bed:  recalled accurately    Delayed Recall:   Sock: recalled without cuing     Blue:  recalled without cuing     Bed:  recalled without cuing    Organization/Problem Solving/Reasoning   Sequencing:    Verbal: Functional      Motor:   To be assessed    Problem solving: Verbal: ([facility]    Acute CVA (cerebrovascular accident) ([facility]       REYMUNDO Salinas. speech pathology graduate extern

## 2020-06-25 NOTE — PROGRESS NOTES
Occupational Therapy   Occupational Therapy Initial Assessment  Date: 2020   Patient Name: Mona Bearden  MRN: [id number]     : 1965  Room: 5515A    Referring Practitioner: Norma Singh MD  Diagnosis: CVA- R basal ganglia infarction; s/p tPA  Additional Pertinent Hx: +ETOH, OA, hx chronic back pain    Date of Service: 2020    Discharge Recommendations:  Home with assist PRN, Outpatient OT     Restrictions   Fall Risk, Impulsivity, L otis     Subjective   Chart Reviewed:  Yes  Additional Pertinent Hx: +ETOH, OA, hx chronic back pain  Family / Caregiver Present: No  Referring Practitioner: Norma Singh MD  Diagnosis: CVA- R basal ganglia infarction; s/p tPA  Subjective: Pt presents supine in bed & was agreeable to OT intervention  Pain Comments: Pt did not c/o pain during OT session    Social/Functional History  Lives With: Alone  Type of Home: Apartment- 3rd floor apt  Home Layout: One level, Laundry in basement- uses elevator to assess laundry  Home Access: Stairs to enter without rails  Bathroom Shower/Tub: Tub/Shower unit, Curtain  Bathroom Toilet: Standard  Bathroom Equipment: Grab bars in shower  ADL Assistance: [address]: Independent  Homemaking Responsibilities: Yes  Meal Prep Responsibility: Primary  Laundry Responsibility: Primary  Cleaning Responsibility: Primary  Bill Paying/Finance Responsibility: Primary  Shopping Responsibility: Primary  Ambulation Assistance: Independent  Transfer Assistance: Independent  Active : Yes  Mode of Transportation: Car  Occupation: Unemployed  IADL Comments: PLOF pt independent in all areas     Objective   Vision: Impaired  Vision Exceptions: Wears glasses for reading  Hearing: Within functional limits      Orientation  Overall Orientation Status: Within Functional Limits     Observation/Palpation  Posture: Good     Balance  Sitting Balance: Supervision  Standing Balance: Contact guard assistance  Functional Mobility  Functional - Mobility Device: No device  Activity: Other  Assist Level: Minimal assistance  Functional Mobility Comments: Pt was CGA ambulating in his room & min vc's for safet & insight. As pt fatigues pt did demo L foot drag while ambulating & vc's for safety & insight  Toilet Transfers  Toilet - Technique: Ambulating  Equipment Used: Standard toilet  Toilet Transfer: Minimal assistance;Contact guard assistance  Toilet Transfers Comments: Pt varies between CGA-Min A- as pt fatigues pt demo L foot drag & vc's for saety & insight  Shower Transfers  Shower - Transfer Type: To and From  Shower - Transfer To: Shower seat without back  Shower - Technique: Ambulating  Shower Transfers: Contact Guard  Shower Transfers Comments: vc's for safety     ADL  Feeding: Minimal assistance;Setup(assist to open small packages)  Grooming: Minimal assistance;Verbal cueing; Increased time to complete;Setup(seated)  UE Bathing: Setup;Minimal assistance(assist to wash & condition his hair)  LE Bathing: Minimal assistance; Increased time to complete;Verbal cueing;Setup(standing in shower to wash his familia area)  UE Dressing: Moderate assistance; Increased time to complete;Verbal cueing;Setup  LE Dressing: Contact guard assistance; Increased time to complete;Verbal cueing;Setup  Toileting: Contact guard assistance  Additional Comments: Pt require min vc's for otis dressing technique & vc's for safety as he moves throughout his environment inhis room     Tone LUE  LUE Tone: Hypotonic  Coordination  Movements Are Fluid And Coordinated: No  Coordination and Movement description: Fine motor impairments;Gross motor impairments; Left UE     Bed mobility  Supine to Sit: Stand by assistance  Scooting: Stand by assistance  Transfers  Stand Pivot Transfers: Stand by assistance  Sit to stand: Stand by assistance  Stand to sit: Stand by assistance     Vision - Basic Assessment  Prior Vision: Wears glasses only for reading  Visual History: No significant visual history  Patient Visual Report: No visual complaint reported. Cognition  Overall Cognitive Status: Exceptions  Problem Solving: Assistance required to generate solutions;Assistance required to correct errors made  Cognition Comment: Impulsivity noted during mobilty & transfers     Sensation  Overall Sensation Status: WFL     LUE PROM: 3/4 PROM LUE in all planes  LUE AROM: Pt has slight shoulder elevation & scapular movement  Left Hand PROM: PPROM 3/4 in all planes  RUE AROM : WFL  Right Hand AROM: WFL    LUE Strength  L Hand: 0/5  LUE Strength Comment: 1+/5 shoulder, elbow, wrist & hand 0/5  RUE Strength  R Hand: 4/5  RUE Strength Comment: shoulder, elbow & wrist 5/5     Hand Dominance: Right    Left Hand Strength -   Handle Setting 2: Pt unable to grasp  & norm for pt age & gender is 102#    Right Hand Strength -    Handle Setting 2: 82# & norm for pt age & gender is 113#    Fine Motor Skills  Left 9-Hole Peg Test: Pt unable to complete & norm for pt age & gender is 19.8 seconds  Right 9-Hole Peg Test: 23.5 seconds & norm for pt age & gender is 18.9 seconds    [employer] Robotic Arm evaluation: [facility], pre & post testing & [employer] protocol using his LUE. Pt LUE ace wrapped for positioning purposes. Pt tolerated the 14cm [place] & completed a total of 164 movement using his LUE. Pt demo the following using his LUE- smoothness 0.259, reach error 0.105, path error 0.051 & initiation time 0.092 seconds, [place] size 0.014 & [place] indepemndence 0.391, hold deviation 0.099 & displacement 0.044.  All movement is aimed @  improving pt neurologic function of his LUE so that he can complete ADLs & IADLs independently     Plan   Times per week: OT twice a day for 10 days to address above problem areas  Times per day: Twice a day  Current Treatment Recommendations: Strengthening, ROM, Gait Training, Safety Education & Training, Balance Training, Self-Care / ADL, Patient/Caregiver Education & Training, Functional Mobility Training, Neuromuscular Re-education, Equipment Evaluation, Education, & procurement, Home Management Training, Endurance Training, Cognitive Reorientation, Modalities (comment), Positioning    Assessment   Performance deficits / Impairments: Decreased functional mobility ; Decreased high-level IADLs;Decreased cognition;Decreased ADL status; Decreased fine motor control;Decreased ROM; Decreased coordination;Decreased strength;Decreased balance;Decreased safe awareness  Prognosis: Good  Decision Making: Medium Complexity  OT Education: OT Role;Energy Conservation;Plan of Care;Precautions; ADL Adaptive Strategies;Transfer Training  Patient Education: Pt educated with regards to OT process. Pt participated in OT goal planning. Pt education to be ongoing to ensure pt safety  REQUIRES OT FOLLOW UP: Yes  Activity Tolerance: Patient Tolerated treatment well  Safety Devices in place: Yes  Type of devices: Call light within reach      Treatment Initiated: Pt educated with regards to dressing strategies & safety during mobility & transfers. Pt pleasant & highly motivated to engage in OT.      Goals  Long term goals  Time Frame for Long term goals : 10 days to address above problem areas  Long term goal 1: Pt demo Mod I to eat all meals  Long term goal 2: Pt demo Mod I grooming standing @ sink level  Long term goal 3: Pt demo Sup bathing @ shower level seated or standing   Long term goal 4: Pt demo independent UE dress & Mod I LE dress & demo G safety  Long term goal 5: Pt demo independent commode trf with appropriate DME to ensure pt safety  Long term goals 6: Pt demo Sup tub trf with approrpiate DME to ensure pt safety  Long term goal 7: Pt demo Mod I light homemaking activity  Long term goal 8: Pt demo improved movement LUE as evidenced by gains made on [employer] robotic arm- LUE- smoothness 0.259, reach error 0.105, path error 0.051 & initiation time 0.092 seconds, [place] size 0.014 & [place] indepemndence 0.391, hold deviation 0.099 & displacement 0.044. All gains to improve pt neurologic recovery of his LUE so that he can complete ADLs & IADLs independently  Long term goal 9: Pt jose angel JIMENEZ endurance for a 30 minute functional activity     Patient goals : \"Move my arm & hand. \" Pt gestured to his LUE     Therapy Time   Individual Concurrent Group Co-treatment   Time In 700-OT eval  710-OT rx         Time Out 710-OT eval  830- OT rx         Minutes 90 Min OT total  [address] [facility]  [address] OT rx            Rell Morales, OTR/L [id number]

## 2020-06-25 NOTE — PROGRESS NOTES
2-/5       Balance  Sitting: sup  Standing: cg[place] [facility]       Date Family Teach Completed        Is additional Family Teaching Needed? Y or N        Hindering Progress L hemiparesis, balance       PT recommended ELOS 2-3 weeks       Team's Discharge Plan        Therapist at Team Meeting          Therapeutic Exercise:   PM: 5x STS transfer at w/c, emphasis on technique and hand placement x2 reps  evowalker 125' with emphasis on L LE placement/reciprocal patterning   4 in curb step step ups ( L LE ascending, R LE descending) x15 to increase strength in involved LE with concentric/eccentric control  30' x2 without AD Raysa (assistance with weight shifting)      Patient education  Pt educated on weight shifting with ambulation, hand placement with transfers, ambulation/stair patterning and technique     Patient response to education:   Pt verbalized understanding Pt demonstrated skill Pt requires further education in this area   yes Yes with vcA yes     Additional Comments: Assisted pt with personal bilateral knee OA braces to reported improvement in knee pain t/o the session. Verbal cues for hand placement/technique at times with decent carryover noted with step to patterning. Trial evowalker with reciprocal patterning emphasized with occasional cues for L LE placement. Trial ambulation without AD short distances to challenge strengh/balance with Raysa required for weight shifting to facilitate consistent toe clearance with the L LE. Will continue to progress as able. Chair/bed alarm: yes    PM  Time in: 1300  Time out: 1345    Pt is making fair progress toward established Physical Therapy goals. Continue with physical therapy current plan of care.     Carol Maher DPT  [id number]

## 2020-06-25 NOTE — H&P
[address]                  [address] [place],  [facility]                              HISTORY AND PHYSICAL    PATIENT NAME: Fahad Marr                      :        1965  MED REC NO:   [id number]                            ROOM:       7340  ACCOUNT NO:   [de-identified]                           ADMIT DATE: 2020  PROVIDER:     Alison Parmar MD    CHIEF COMPLAINT:  CVA. HISTORY OF PRESENT ILLNESS:  The patient was admitted to [facility]  with sudden onset of left hemiparesis. He did receive t-PA. CT of the  head showed a right basal ganglion stroke. He is still at a mod to max  assist level, but is showing some improvement in strength of the left  side more so in the lower. The upper is still very weak. He is felt to  be a good candidate for further rehab. PAST MEDICAL HISTORY:  1. Nicotine addiction. 2.  Recovering alcohol use disorder. 3.  Chronic back pain. 4.  Osteoarthritis of both knees. 5.  COPD. ALLERGIES:  None known. CURRENT MEDICATIONS:  Tylenol, aspirin, Lipitor, Plavix, Flexeril,  Nicoderm patch, and Norco as needed for pain. HABITS:  The patient smoked right up until his hospitalization. SOCIAL HISTORY:  He lives alone. REVIEW OF SYSTEMS:  See prior consult. PHYSICAL EXAMINATION:  GENERAL:  A well-nourished, well-developed, white male in no acute  distress. HEENT:  Head normocephalic, atraumatic. Eyes:  Pupils equal, round, and  reactive to light. LUNGS:  Decreased breath sounds with scattered rales and rhonchi. HEART:  Regular rate and rhythm. S1, S2 normal.  No murmurs or gallops. ABDOMEN:  Bowel sounds normal.  Soft, nontender. No masses. EXTREMITIES:  Without clubbing, cyanosis or edema. MENTAL STATUS:  Alert and oriented. Sensation diminished on the left. NEUROMUSCULAR:  4/5 on the right. Left upper is trace.   Left lower is  3-/5 at the hip and knee, and 2-/5 at the ankle.    PROBLEM LIST:  1. Right basal ganglion stroke with left hemiparesis. 2.  Status post t-PA. 3.  Nicotine addiction. 4.  COPD. 5.  Chronic back pain. Individualized overall plan of care:  I think the patient is a good  candidate for further rehab. We have addressed smoking issue. He has  not had any alcohol for a number of years, so he has been doing well in  that regard. The left upper is flaccid. Therapy will complete their  evals, but I suspect we are going to need to do some E-Stim as well as  InMotion ARM to try to get that working more effectively. Left lower  seems to be coming back a little bit better. The ankle is weak. I am  not sure if we are going to need ankle bracing or not. I think it  depends on how he recovers over the next few days, but there is good  potential for improvement. Rehab prognosis is good. Medical prognosis  is good. PT will be working on ambulation, transfers, and advanced transfers an  hour and a half, five to seven days a week with goals of modified  independent. OT will be working on upper extremity strengthening,  functioning, ADL skills and basic homemaking an hour and a half, five to  seven days a week with goals of modified independent. Speech will be  seeing him for any dysarthria issues, although he seems to be good in  that regard. He will have 24-hour-a-day, seven-day-a-week rehab nursing  to address bowel and bladder issues, carryover from therapy and safety. Overall length of stay will probably be about three weeks with the  patient returning home at the end of that time.         Jenni Sales MD    D: 06/25/2020 9:42:50       T: 06/25/2020 9:51:25     [email or web address]  Job#: [id number]     Doc#: [id number]    CC:

## 2020-06-25 NOTE — CARE COORDINATION
Social Work Assessment Summary  Completed with patient     PCP: Shazia Kearney     Patient Resides: In a subsidized efficiency apartment at [facility] :one floor living . Building has elevator. Elevator to [employer]. Tub/shower with curtain     Will you return to your own home? yes        Primary Radha Harris is estranged from his children. His sister Yulia Lovell is local and supportive. She is in good health and drives but works full time. She is willing to provide intermittent support and transportation /shopping. Level of Function PTA :independent and driving    [address] employment. 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 Guera Aguilar  Phone: [phone]. He contacted SW this date and is available to coordinate needed services at discharge.      Do they have a medical profile:no  Family Teaching:TBS    Strength: Very motivated    Preference: Stay here and get my walking back and work on my left arm     NAME RELATION HOME # WORK # CELL #   Shania King sister   [phone]                   Height:5'10\"  Weight:145

## 2020-06-25 NOTE — PROGRESS NOTES
Physical Therapy    Facility/Department: [address] REHAB  Initial Assessment    NAME: Chad Leo  : 1965  MRN: [id number]    Date of Service: 2020  Evaluating Therapist: Brett Madison DPT    ROOM: [address] [place] [address]  DIAGNOSIS: Acute CVA  PMH: To ED for L sided weakness/numbness. Found to have occlusion of left common carotid artery. tPA administered. CTA performed today showed an evolving late acute infarct of the right basal ganglia. Noted fall during hospitalization. PMH includes HTN, EtOH abuse, chronic back/knee pain     PRECAUTIONS: Left hemiparesis, falls, chair/bed alarm     Pt lives alone in a 3rs floor apartment with elevator access. Pt ambulated with no AD PTA. Pt independent for ADL performance. Initial Evaluation  20   Short Term Goals Long Term Goals    Was pt agreeable to Eval/treatment? yes       Does pt have pain? Chronic knee/back pain        Bed Mobility  Rolling: sba  Supine to sit: sba  Sit to supine: sba  Scooting: sba   sup  mod I    Transfers Sit to stand: cgA  Stand to sit: cgA  Stand pivot: cgA with LBQC    Sup with AAD Mod I with AAD   Ambulation    50 feet with LBQC with cgA (L DF ace wrap)   300 feet with AAD with sup >500 feet with AAD with mod I   Walking 10 feet on uneven surface 10 feet with LBQC with cgA       Wheel Chair Mobility n/a       Car Transfers Raysa   sup Mod I   Stair negotiation: ascended and descended  8 steps with R rail with Raysa   >12 steps with one rail with sup >12 steps with one rail with mod I   Curb Step:   ascended and descended 7.5 inch step with LBQC and Raysa   7.5 inch step with AAD and sup 7.5 inch step with AAD and mod I    Picking up object off the floor Picked up 1# with Raysa        BLE ROM WFL       BLE Strength R LE: 4+/5    L LE: hip 3-/5, knee 3-/5, ankle 2-/5       Balance  Sitting: sup  Standing: cg/Raysa with [facility]       Date Family Teach Completed        Is additional Family Teaching Needed?   Y or N        Hindering Progress L hemiparesis, balance       PT recommended ELOS 2-3 weeks       Team's Discharge Plan        Therapist at Team Meeting          Pt is alert and Oriented x4. Sensation: intact to LT  Edema: none noted  Coordination: delayed due to strength deficits on L LE with HTN, L UE impaired due to weakness  Vision: Slight L visual field deficit with peripheral field testing   Tone/reflexes: none noted   Endurance: fair, rest breaks PRN   Skin was inspected: intact  Chair alarm: yes     ASSESSMENT  Pt displays functional ability as noted in the objective portion of this evaluation. Comments:  Pt supine in bed, agreeable to PT evaluation and treatment. Educated extensively on directional preferences with transfers and current impairment. L hemiparesis noted with mobility with UE more impaired than LE. Trial ace wrap to L LE for DF/EV due to weakness noted. Educated on step to patterning and weight shifting with tactile input for weight shifting and verbal cues for sequencing required. Slight knee wobble noted with WA on L LE. Pt reporting chronic knee and back pain and wearing knee braces from home for support. Pt will benefit from L UE sling at this time due to flaccidity to protect his shoulder and reduce risk of subluxation. Pt will benefit form intensive skilled PT to increase functional mobility and progress to safest LRAD. Pt may benefit from future orthotics consult pending NM return for L DF weakness. Patient education  Pt educated on weight shifting with mobility, safety with mobility, sequencing with functional mobility, hand placement with transfers, role of PT, PT POC, call bell use, need for assistance    Patient response to education:   Pt verbalized understanding Pt demonstrated skill Pt requires further education in this area   yes Yes with vcA yes     Rehab potential is Good for reaching above PT goals. Pts/ family goals   1.  To get out of here    Patient and or family understand(s) diagnosis, prognosis, and plan of care. PLAN  PT care will be provided in accordance with the objectives noted above. Whenever appropriate, clear delegation orders will be provided for nursing staff. Exercises and functional mobility practice will be used as well as appropriate assistive devices or modalities to obtain goals. Patient and family education will also be administered as needed. Frequency of treatments will be 2x/day M-F and 1x/day Sat or Sun x  14-21 days.     Time in: 0915  Time out: [address], [place]   [id number]

## 2020-06-25 NOTE — PROGRESS NOTES
OCCUPATIONAL THERAPY DAILY NOTE    Date:2020  Patient Name: Rose Rivas  MRN: [id number]  : 1965  [facility]   Referring Practitioner: Elisabet Fam MD  Diagnosis: CVA- R basal ganglia infarction; s/p tPA  Additional Pertinent Hx: +ETOH, OA, hx chronic back pain     Precautions: Fall Risk, Impulsivity, L otis     Functional Assessment:   Date Status AE  Comments   Feeding 20 Minimal Assist   Per eval   Grooming 20 Minimal Assist   \" \"   Bathing 20 Minimal Assist   \" \"   UB Dressing 20 Moderate Assist   \" \"   LB Dressing 20 Contact Guard Assist   \" \"   Homemaking  TBA       Functional Transfers / Balance:   Date Status DME  Comments   Sit Balance 20 Supervision   Sitting in w/c   Stand Balance 20 Minimal Assist   Per eval   [] Tub  [] Shower   Transfer 20 Contact Guard Assist   \" \"   Commode   Transfer 20 Minimal Assist   \" \"   Functional   Mobility 20 Minimal Assist   \" \"   Other:         Functional Exercises / Activity:   Pt sitting in chair upon arrival to OT gym. Engaged in therex of towel slides on inclined wedge to increase LUE ROM, 3x15 reps shoulder flex and abduction/adduction, 3x15 reps. Pt completed with hand over hand assist. Participated in arm scait, 3x10 reps using LUE to increase ROM for ease with ADLs. Pt able to complete without assist from [address]. Sensory / Neuromuscular Re-Education:      Cognitive Skills:   Status Comments   Problem   Solving fair     Memory fair     Sequencing fair     Safety fair       Visual Perception:    Education:    [] Family teach completed on:    Pain Level: 0/10    Additional Notes:       Patient has made fair  progress during treatment sessions toward set goals.  Therapy emphasis to obtain goals: Strengthening, ROM, Gait Training, Safety Education & Training, Balance Training, Self-Care / ADL, Patient/Caregiver Education & Training, Functional Mobility Training, Neuromuscular Re-education, Equipment Evaluation, Education, & procurement, Home Management Training, Endurance Training, Cognitive Reorientation, Modalities (comment), Positioning    [x] Continue with current OT Plan of care.   [] Prepare for Discharge     DISCHARGE RECOMMENDATIONS  Recommended DME:    Post Discharge Care:   []Home Independently  []Home with 24hr Care / Supervision []Home with Partial Supervision []Home with Home Health OT []Home with Out Pt OT []Other: ___   Comments:         Time in Time out Tx Time Breakdown  Variance:   First Session  eval  [] Individual Tx-   [] Concurrent Tx -  [] Co-Tx -   [] Group Tx -   [] Time Missed -     Second Session 10:00 10:30 [] Individual Tx-   [x] Concurrent Tx -30  [] Co-Tx -   [] Group Tx -   [] Time Missed -     Third Session    [] Individual Tx-   [] Concurrent Tx -  [] Co-Tx -   [] Group Tx -   [] Time Missed -         Total Tx Time  [place], [address] [facility], OTR/L [id number]

## 2020-06-26 PROCEDURE — 97112 NEUROMUSCULAR REEDUCATION: CPT

## 2020-06-26 PROCEDURE — 97110 THERAPEUTIC EXERCISES: CPT

## 2020-06-26 PROCEDURE — 97530 THERAPEUTIC ACTIVITIES: CPT

## 2020-06-26 PROCEDURE — 1280000000 HC REHAB R&B

## 2020-06-26 PROCEDURE — 97535 SELF CARE MNGMENT TRAINING: CPT

## 2020-06-26 PROCEDURE — 6370000000 HC RX 637 (ALT 250 FOR IP): Performed by: PHYSICAL MEDICINE & REHABILITATION

## 2020-06-26 PROCEDURE — 6370000000 HC RX 637 (ALT 250 FOR IP): Performed by: INTERNAL MEDICINE

## 2020-06-26 PROCEDURE — 92526 ORAL FUNCTION THERAPY: CPT

## 2020-06-26 RX ORDER — CYCLOBENZAPRINE HCL 10 MG
5 TABLET ORAL 3 TIMES DAILY
Status: DISCONTINUED | OUTPATIENT
Start: 2020-06-26 | End: 2020-06-28

## 2020-06-26 RX ADMIN — ACETAMINOPHEN 1000 MG: 500 TABLET ORAL at 20:38

## 2020-06-26 RX ADMIN — HYDROCODONE BITARTRATE AND ACETAMINOPHEN 1 TABLET: 10; 325 TABLET ORAL at 08:09

## 2020-06-26 RX ADMIN — CYCLOBENZAPRINE 5 MG: 10 TABLET, FILM COATED ORAL at 20:38

## 2020-06-26 RX ADMIN — CLOPIDOGREL 75 MG: 75 TABLET, FILM COATED ORAL at 08:46

## 2020-06-26 RX ADMIN — HYDROCODONE BITARTRATE AND ACETAMINOPHEN 1 TABLET: 10; 325 TABLET ORAL at 14:49

## 2020-06-26 RX ADMIN — ATORVASTATIN CALCIUM 80 MG: 80 TABLET, FILM COATED ORAL at 20:38

## 2020-06-26 RX ADMIN — ASPIRIN 81 MG 81 MG: 81 TABLET ORAL at 08:45

## 2020-06-26 RX ADMIN — ACETAMINOPHEN 1000 MG: 500 TABLET ORAL at 08:46

## 2020-06-26 ASSESSMENT — PAIN DESCRIPTION - ONSET
ONSET: ON-GOING
ONSET: ON-GOING

## 2020-06-26 ASSESSMENT — PAIN DESCRIPTION - PROGRESSION
CLINICAL_PROGRESSION: NOT CHANGED
CLINICAL_PROGRESSION: NOT CHANGED

## 2020-06-26 ASSESSMENT — PAIN SCALES - GENERAL
PAINLEVEL_OUTOF10: 8
PAINLEVEL_OUTOF10: 8
PAINLEVEL_OUTOF10: 0
PAINLEVEL_OUTOF10: 3
PAINLEVEL_OUTOF10: 8
PAINLEVEL_OUTOF10: 6
PAINLEVEL_OUTOF10: 0

## 2020-06-26 ASSESSMENT — PAIN DESCRIPTION - DESCRIPTORS
DESCRIPTORS: ACHING;DISCOMFORT
DESCRIPTORS: ACHING;CONSTANT;DISCOMFORT

## 2020-06-26 ASSESSMENT — PAIN DESCRIPTION - LOCATION
LOCATION: BACK;KNEE
LOCATION: BACK

## 2020-06-26 ASSESSMENT — PAIN - FUNCTIONAL ASSESSMENT
PAIN_FUNCTIONAL_ASSESSMENT: PREVENTS OR INTERFERES SOME ACTIVE ACTIVITIES AND ADLS
PAIN_FUNCTIONAL_ASSESSMENT: PREVENTS OR INTERFERES SOME ACTIVE ACTIVITIES AND ADLS

## 2020-06-26 ASSESSMENT — PAIN DESCRIPTION - PAIN TYPE
TYPE: CHRONIC PAIN
TYPE: CHRONIC PAIN

## 2020-06-26 ASSESSMENT — PAIN DESCRIPTION - FREQUENCY
FREQUENCY: CONTINUOUS
FREQUENCY: CONTINUOUS

## 2020-06-26 NOTE — PROGRESS NOTES
Olga Lidia Nieves is a 47 y.o. male patient. Current Facility-Administered Medications   Medication Dose Route Frequency Provider Last Rate Last Dose    acetaminophen (TYLENOL) tablet 650 mg  650 mg Oral Q4H PRN Siva Iglesias MD   650 mg at 06/25/20 1912    polyethylene glycol (GLYCOLAX) packet 17 g  17 g Oral Daily PRN Siva Iglesias MD        nicotine (NICODERM CQ) 21 MG/24HR 1 patch  1 patch Transdermal Daily Fuad Trejo MD   1 patch at 06/25/20 0843    acetaminophen (TYLENOL) tablet 1,000 mg  1,000 mg Oral Q12H Fuad Trejo MD   1,000 mg at 06/25/20 2056    albuterol (PROVENTIL) nebulizer solution 2.5 mg  2.5 mg Nebulization Q4H PRN Fuad Trejo MD        aspirin chewable tablet 81 mg  81 mg Oral Daily Fuad Trejo MD   81 mg at 06/25/20 0844    atorvastatin (LIPITOR) tablet 80 mg  80 mg Oral Nightly Fuad Trejo MD   80 mg at 06/25/20 2259    clopidogrel (PLAVIX) tablet 75 mg  75 mg Oral Daily Fuad Trejo MD   75 mg at 06/25/20 0844    cyclobenzaprine (FLEXERIL) tablet 10 mg  10 mg Oral TID Fuad Trejo MD   10 mg at 06/25/20 2056    HYDROcodone-acetaminophen (NORCO)  MG per tablet 1 tablet  1 tablet Oral Q6H PRN Fuad Trejo MD   1 tablet at 06/26/20 0809     No Known Allergies  Active Problems:    Acute CVA (cerebrovascular accident) [facility])  Resolved Problems:    * No resolved hospital problems. *    Blood pressure 127/81, pulse 70, temperature 98.1 °F (36.7 °C), temperature source Temporal, resp. rate 18, height 5' 10\" (1.778 m), weight 145 lb (65.8 kg), SpO2 95 %. Subjective:  Symptoms:  Stable. He reports weakness. Diet:  Adequate intake. Activity level: Impaired due to weakness. Pain:  He reports no pain. Objective:  General Appearance: In no acute distress. Vital signs: (most recent): Blood pressure 127/81, pulse 70, temperature 98.1 °F (36.7 °C), temperature source Temporal, resp. rate 18, height 5' 10\" (1.778 m), weight 145 lb (65.8 kg), SpO2 95 %. Vital signs are normal.    Output: Producing urine and producing stool. Lungs:  Normal effort and normal respiratory rate. Breath sounds clear to auscultation. Heart: Normal rate. Regular rhythm. S1 normal and S2 normal.    Abdomen: Abdomen is soft. Bowel sounds are normal.   There is no abdominal tenderness. Extremities: Normal range of motion. Neurological: Patient is alert. (Trace left arm  4-/5 left leg). Assessment:      Date   Status   AE    Comments     Feeding   6/25/20   Minimal Assist        Per eval     Grooming   6/25/20   Minimal Assist        \" \"     Bathing   6/25/20   Minimal Assist        \" \"     UB Dressing   6/25/20   Moderate Assist        \" \"     LB Dressing   6/25/20   Contact Guard Assist        \" \"     Homemaking       TBA                  Functional Transfers / Balance:      Date Status DME  Comments   Sit Balance 6/25/20   Supervision    Sitting in w/c   Stand Balance 6/25/20   Minimal Assist    Per eval   []? Tub  []? Shower   Transfer 6/25/20   Contact Guard Assist    \" \"     Commode   Transfer 6/25/20   Minimal Assist    \" \"     Functional   Mobility 6/25/20   Minimal Assist    \" \"     Other:                Assessment:    Condition: In stable condition. Improving.   (CVA). Plan:   Encourage ambulation. (He is doing well with therapy overall  Very impulsive  I caught him trying to get up in the bathroom by himself and he was unsteady  Also had some choking with his breakfast this morning when he tried to take too large of a drink  I will review that further with therapy but I think the main issue is impulsivity as he continues to improve).        Katrina Ferrer MD  6/26/2020

## 2020-06-26 NOTE — PROGRESS NOTES
Problem   Solving fair     Memory fair     Sequencing fair     Safety fair       Visual Perception:    Education:  Pt educated on safety during SPT. [] Family teach completed on:    Pain Level: 0/10    Additional Notes:       Patient has made fair  progress during treatment sessions toward set goals. Therapy emphasis to obtain goals: Strengthening, ROM, Gait Training, Safety Education & Training, Balance Training, Self-Care / ADL, Patient/Caregiver Education & Training, Functional Mobility Training, Neuromuscular Re-education, Equipment Evaluation, Education, & procurement, Home Management Training, Endurance Training, Cognitive Reorientation, Modalities (comment), Positioning    [x] Continue with current OT Plan of care. [] Prepare for Discharge     DISCHARGE RECOMMENDATIONS  Recommended DME:    Post Discharge Care:   []Home Independently  []Home with 24hr Care / Supervision []Home with Partial Supervision []Home with Home Health OT []Home with Out Pt OT []Other: ___   Comments:         Time in Time out Tx Time Breakdown  Variance:   First Session  8:05 8:15 [x] Individual Tx- 10 Mins  [] Concurrent Tx -  [] Co-Tx -   [] Group Tx -   [] Time Missed -     Second Session 10:00 10:45 [x] Individual Tx- 45 Mins  [] Concurrent Tx -  [] Co-Tx -   [] Group Tx -   [] Time Missed -     Third Session  2:05 2:35 [x] Individual Tx- 30 Mins  [] Concurrent Tx -  [] Co-Tx -   [] Group Tx -   [] Time Missed -         Total Tx Time  85 Mins     Rosalba Boxer CASTILLO/L [id number]  I have read the above note and agree with the documentation.   Izzy Patel OTR/L [id number]

## 2020-06-26 NOTE — PROGRESS NOTES
Physical Therapy    Facility/Department: [address] REHAB  Treatment Note    NAME: Dat Goodwin  : 1965  MRN: [id number]    Date of Service: 2020  Evaluating Therapist: Andrew Good DPT    ROOM: [facility]/[address]  DIAGNOSIS: Acute CVA  PMH: To ED for L sided weakness/numbness. Found to have occlusion of left common carotid artery. tPA administered. CTA performed today showed an evolving late acute infarct of the right basal ganglia. Noted fall during hospitalization. PMH includes HTN, EtOH abuse, chronic back/knee pain     PRECAUTIONS: Left hemiparesis, falls, chair/bed alarm, bilateral knee OA braces     Pt lives alone in a 3rs floor apartment with elevator access. Pt ambulated with no AD PTA. Pt independent for ADL performance. Initial Evaluation  20 AM PM Short Term Goals Long Term Goals    Was pt agreeable to Eval/treatment? yes yes yes     Does pt have pain?  Chronic knee/back pain  Chronic back/knee pain  Chronic back/knee pain      Bed Mobility  Rolling: sba  Supine to sit: sba  Sit to supine: sba  Scooting: sba Sup (all aspects)  sup  mod I    Transfers Sit to stand: cgA  Stand to sit: cgA  Stand pivot: cgA with LBQC  Sit to stand: cgA/sba  Stand to sit: cgA/sba  Stand pivot: cgA with LBQC  Sit to stand: cgA/sba  Stand to sit: cgA/sba  Stand pivot: cgA without AD Sup with AAD Mod I with AAD   Ambulation    50 feet with LBQC with cgA (L DF ace wrap) 75 feet with LBQC with cgA/sba (, bilateral OA braces) 75 feet without AD cg/sba (bilateral OA braces) 300 feet with AAD with sup >500 feet with AAD with mod I   Walking 10 feet on uneven surface 10 feet with LBQC with cgA       Wheel Chair Mobility n/a       Car Transfers Raysa   sup Mod I   Stair negotiation: ascended and descended  8 steps with R rail with Raysa 8 steps with R rail with cgA (NR patterning)  12 steps with R rail with cgA (NR patterning)  >12 steps with one rail with sup >12 steps with one rail with mod I   Curb Step:   ascended and descended 7.5 inch step with LBQC and Raysa  2 in curb step without AD cg/Raysa 7.5 inch step with AAD and sup 7.5 inch step with AAD and mod I    Picking up object off the floor Picked up 1# with Raysa        BLE ROM WFL       BLE Strength R LE: 4+/5    L LE: hip 3-/5, knee 3-/5, ankle 2-/5       Balance  Sitting: sup  Standing: cg/Raysa with [facility]       Date Family Teach Completed        Is additional Family Teaching Needed? Y or N        Hindering Progress L hemiparesis, balance       PT recommended ELOS 2-3 weeks       Team's Discharge Plan        Therapist at Team Meeting          Therapeutic Exercise:   AM: 5x STS transfer at w/c, emphasis on technique and hand placement x2 reps  evowalker 150' with emphasis on L LE placement/reciprocal patterning   6 in curb step step ups ( L LE ascending, R LE descending) x15 to increase strength in involved LE with concentric/eccentric control  50' x2 without AD cgA (assistance with weight shifting)      PM: 5x STS transfer at w/c, emphasis on technique and hand placement x2 reps  76' x2 without AD cgA/sba (assistance with weight shifting)  evowalker 150' with emphasis on L LE placement/reciprocal patterning   Agility ladder: NR patterning 10' x2 bilateral LE, alternating NR patterning 10' x2, side stepping laterally 10' x2 with emphasis on weight shifting/heel strike/foot placement     Patient education  Pt educated on weight shifting with ambulation, hand placement with transfers, ambulation/stair patterning and technique     Patient response to education:   Pt verbalized understanding Pt demonstrated skill Pt requires further education in this area   yes Yes with vcA yes     Additional Comments: Reviewed all mobility as per above. Increased DF strength noted with ace wrap removed t/o session. With verbal cues for compensatory knee/hip flexion and emphasizing heel strike, improved consistency on heel strike and toe off noted. Will continue to trial mobility without ace wrap. Improvement in weight shifting/toe clearance with ambulation without AD with only occasional weight shifting required. In PM, continued to challenge balance without AD with ambulation with occasional verbal cues to facilitate L LE toe clearance/heel strike. Improving foot clearance noted with L LE with weight shifting however still requiring vcA to increase VIANEY. Will continue to challenge/balance as able. Chair/bed alarm: yes    AM  Time in: 0915  Time out: 1000    PM  Time in: 1300  Time out: 1345    Pt is making fair progress toward established Physical Therapy goals. Continue with physical therapy current plan of care.     Dre Ramirez, CLAIRET  [id number]

## 2020-06-26 NOTE — PROGRESS NOTES
Speech Language Pathology  ACUTE REHABILITATION--DAILY PROGRESS NOTE            ADMITTING DIAGNOSIS: [facility] [I63.9]     SUMMARY OF EVALUATION                 DYSPHAGIA DIAGNOSIS:  Minimal/resolving oropharyngeal dysphagia                            DIET RECOMMENDATIONS:  Regular consistency solids with  thin liquids                 FEEDING RECOMMENDATIONS:                           Assistance level:  Set-up is required for all oral intake                             Compensatory strategies recommended: Small bites/sips and No straw     THERAPY RECOMMENDATIONS:                 Ongoing meal endurance analysis to provide diet modification and compensatory strategy implementation to minimize risk of aspiration associated with PO intake     SPEECH PATHOLOGY DIAGNOSIS:    Mild/resolving dysarthria with minimal left labiobuccal weakness. Functional cognitive-linguistic skills for verbal tasks; however, impulsivity reported with execution of functionally-based motor tasks.     THERAPY RECOMMENDATIONS:   Speech Pathology intervention is recommended 3-6 times per week for LOS or when goals are met with emphasis on the following:      Increase speech intelligibility by               -- improving strength/ROM of the facial and lingual musculature to facilitate and sustain accuracy of articulator placement. -- articulation drill training to promote precision of phoneme production at the word and sentence levels (goal met given 90% accuracy of production with unknown context). Identify physical/cognitive/visuospatial impairments, verbalize impact each have on overall level of functional independence, and demonstrate appropriate insight/safety precautions given these limitations with >90% of trials.                                                                                                                                         FIMS SCORES               noted with eventual clearance of oral residuals. Pharyngeal-     Strong coughing observed x1 with larger presentation of coffee post meal. Physician reported observing similar episode this morning. Reiterated importance of single/small sips. Clear vocal quality was maintained throughout. Completed education with the patient regarding type of swallowing impairment. Reviewed current solid/liquid consistency diet recommendations and reinforced need for consistent use of compensatory strategies to ensure safe PO intake. Reviewed aspiration precautions. Encouraged patient to engage SLP in unstructured Q&A session relative to identified deficit areas -- patient indicated understanding of all information provided via satisfactory verbal response. Supervision is required for all meals. LANGUAGE:     Jh Richardson was within functional limits. COGNITION:       Functional cognition for verbal tasks. Safety: To be assessed. Impulsivity reported by staff. SPEECH:     Slurred quality noted during conversational speech. Pt completed oral motor exercises with prompts to maximize movements. Pt reported fatigue at the end of session. Pt was instructed to complete articulation exercises and oral  motor exercises independently. SP recommended after discharge:  TBD    Will continue SP intervention as per previously established POC.     Minute Tracking:    Individual therapy:   15 minutes oral motor  Concurrent therapy:  30 minutes swallow  Group therapy:     0 minutes  Co-treatment therapy:    0 minutes    Total minutes for 6/26/2020: 45 minutes      Nile Bumpers, [place]. speech pathology graduate extern

## 2020-06-27 PROCEDURE — 92526 ORAL FUNCTION THERAPY: CPT

## 2020-06-27 PROCEDURE — 99232 SBSQ HOSP IP/OBS MODERATE 35: CPT | Performed by: PHYSICAL MEDICINE & REHABILITATION

## 2020-06-27 PROCEDURE — 6370000000 HC RX 637 (ALT 250 FOR IP): Performed by: PHYSICAL MEDICINE & REHABILITATION

## 2020-06-27 PROCEDURE — 6370000000 HC RX 637 (ALT 250 FOR IP): Performed by: INTERNAL MEDICINE

## 2020-06-27 PROCEDURE — 1280000000 HC REHAB R&B

## 2020-06-27 PROCEDURE — 97530 THERAPEUTIC ACTIVITIES: CPT

## 2020-06-27 RX ADMIN — CLOPIDOGREL 75 MG: 75 TABLET, FILM COATED ORAL at 08:12

## 2020-06-27 RX ADMIN — HYDROCODONE BITARTRATE AND ACETAMINOPHEN 1 TABLET: 10; 325 TABLET ORAL at 14:25

## 2020-06-27 RX ADMIN — ACETAMINOPHEN 1000 MG: 500 TABLET ORAL at 08:12

## 2020-06-27 RX ADMIN — ACETAMINOPHEN 650 MG: 325 TABLET ORAL at 17:52

## 2020-06-27 RX ADMIN — ACETAMINOPHEN 1000 MG: 500 TABLET ORAL at 22:40

## 2020-06-27 RX ADMIN — ATORVASTATIN CALCIUM 80 MG: 80 TABLET, FILM COATED ORAL at 20:29

## 2020-06-27 RX ADMIN — HYDROCODONE BITARTRATE AND ACETAMINOPHEN 1 TABLET: 10; 325 TABLET ORAL at 20:28

## 2020-06-27 RX ADMIN — CYCLOBENZAPRINE 5 MG: 10 TABLET, FILM COATED ORAL at 20:28

## 2020-06-27 RX ADMIN — HYDROCODONE BITARTRATE AND ACETAMINOPHEN 1 TABLET: 10; 325 TABLET ORAL at 08:13

## 2020-06-27 RX ADMIN — ASPIRIN 81 MG 81 MG: 81 TABLET ORAL at 08:12

## 2020-06-27 ASSESSMENT — PAIN DESCRIPTION - LOCATION
LOCATION: BACK;KNEE

## 2020-06-27 ASSESSMENT — PAIN SCALES - GENERAL
PAINLEVEL_OUTOF10: 6
PAINLEVEL_OUTOF10: 8
PAINLEVEL_OUTOF10: 0
PAINLEVEL_OUTOF10: 6
PAINLEVEL_OUTOF10: 7
PAINLEVEL_OUTOF10: 2
PAINLEVEL_OUTOF10: 9
PAINLEVEL_OUTOF10: 8
PAINLEVEL_OUTOF10: 2
PAINLEVEL_OUTOF10: 3
PAINLEVEL_OUTOF10: 0
PAINLEVEL_OUTOF10: 9
PAINLEVEL_OUTOF10: 8

## 2020-06-27 ASSESSMENT — PAIN DESCRIPTION - PROGRESSION
CLINICAL_PROGRESSION: NOT CHANGED

## 2020-06-27 ASSESSMENT — PAIN DESCRIPTION - FREQUENCY
FREQUENCY: CONTINUOUS

## 2020-06-27 ASSESSMENT — PAIN DESCRIPTION - ONSET
ONSET: GRADUAL
ONSET: ON-GOING
ONSET: ON-GOING
ONSET: GRADUAL

## 2020-06-27 ASSESSMENT — PAIN DESCRIPTION - DESCRIPTORS
DESCRIPTORS: ACHING;BURNING
DESCRIPTORS: ACHING;CRAMPING;DISCOMFORT
DESCRIPTORS: ACHING;BURNING
DESCRIPTORS: ACHING;CRAMPING;DISCOMFORT

## 2020-06-27 ASSESSMENT — PAIN - FUNCTIONAL ASSESSMENT
PAIN_FUNCTIONAL_ASSESSMENT: PREVENTS OR INTERFERES WITH MANY ACTIVE NOT PASSIVE ACTIVITIES
PAIN_FUNCTIONAL_ASSESSMENT: PREVENTS OR INTERFERES SOME ACTIVE ACTIVITIES AND ADLS
PAIN_FUNCTIONAL_ASSESSMENT: PREVENTS OR INTERFERES WITH MANY ACTIVE NOT PASSIVE ACTIVITIES

## 2020-06-27 ASSESSMENT — PAIN DESCRIPTION - PAIN TYPE
TYPE: CHRONIC PAIN

## 2020-06-27 ASSESSMENT — PAIN DESCRIPTION - ORIENTATION
ORIENTATION: LOWER

## 2020-06-27 NOTE — PROGRESS NOTES
Physical Therapy    Facility/Department: [facility] 5SE REHAB  Treatment Note    NAME: Eliz Goodelor  : 1965  MRN: [id number]    Date of Service: 2020  Evaluating Therapist: Marsha Esteban DPT    ROOM: [address] [place] [address]  DIAGNOSIS: Acute CVA  PMH: To ED for L sided weakness/numbness. Found to have occlusion of left common carotid artery. tPA administered. CTA performed today showed an evolving late acute infarct of the right basal ganglia. Noted fall during hospitalization. PMH includes HTN, EtOH abuse, chronic back/knee pain     PRECAUTIONS: Left hemiparesis, falls, chair/bed alarm, bilateral knee OA braces     Pt lives alone in a 3rs floor apartment with elevator access. Pt ambulated with no AD PTA. Pt independent for ADL performance. Initial Evaluation  20 AM Short Term Goals Long Term Goals    Was pt agreeable to Eval/treatment? yes yes     Does pt have pain?  Chronic knee/back pain  Chronic back/knee pain      Bed Mobility  Rolling: sba  Supine to sit: sba  Sit to supine: sba  Scooting: sba NT sup  mod I    Transfers Sit to stand: cgA  Stand to sit: cgA  Stand pivot: cgA with LBQC  Sit to stand: cgA/sba  Stand to sit: cgA/sba  Stand pivot: cgA without AD Sup with AAD Mod I with AAD   Ambulation    50 feet with LBQC with cgA (L DF ace wrap) 120 feet without AD cg/sba (bilateral OA braces) 300 feet with AAD with sup >500 feet with AAD with mod I   Walking 10 feet on uneven surface 10 feet with LBQC with cgA NT     Wheel Chair Mobility n/a      Car Transfers Raysa NT sup Mod I   Stair negotiation: ascended and descended  8 steps with R rail with Raysa 12 steps with R rail with cgA (NR patterning)  >12 steps with one rail with sup >12 steps with one rail with mod I   Curb Step:   ascended and descended 7.5 inch step with LBQC and Raysa NT 7.5 inch step with AAD and sup 7.5 inch step with AAD and mod I    Picking up object off the floor Picked up 1# with Raysa       BLE ROM WFL      BLE Strength R LE: 4+/5    L LE: hip 3-/5, knee 3-/5, ankle 2-/5      Balance  Sitting: sup  Standing: cg/Raysa with LBQC Sitting: sup  Standing: CGA/Raysa no AD     Date Family Teach Completed       Is additional Family Teaching Needed? Y or N       Hindering Progress L hemiparesis, balance L hemiparesis, balance     PT recommended ELOS 2-3 weeks      Team's Discharge Plan       Therapist at Team Meeting         Therapeutic Exercise:   AM: STS x 10 reps at wheelchair CGA/SBA  evowalker 300' x 2 reps at SBA level with emphasis on heel-toe sequencing of LLE  6\" curb step step ups x 10 reps with LLE ascending and RLE descending for improved LLE strength/balance  Functional mobility as noted above    Patient education  Pt educated decreasing speed of mobility for safety, educated on heel toe sequencing of LLE. Patient response to education:   Pt verbalized understanding Pt demonstrated skill Pt requires further education in this area   yes Yes with cues yes     Additional Comments: Reviewed all mobility as per above. Pt's balance continues to improve with all mobility. Continued gait training without AD. Samlker utilized to promote weight bearing through LUE as well as improve gait quality. Chair/bed alarm: yes    AM  Time in: 1005  Time out: 1035      Pt is making fair progress toward established Physical Therapy goals. Continue with physical therapy current plan of care.     David Moore, PT, DPT  [id number]

## 2020-06-27 NOTE — PROGRESS NOTES
BILIRUBINUR Negative 04/01/2019     Lab Results   Component Value Date    LABA1C 5.3 06/22/2020          Assessment/Plan:       47 y.o. male admitted to inpatient rehabilitation for CVA.      -Stable  Condition  -Continue secondary prevention   -Continue Acute rehab program, encourage ambulation  -Continue remainder of plan of care    Electronically signed by Jun Smith MD on 6/27/2020 at 1:35 PM

## 2020-06-27 NOTE — PROGRESS NOTES
Speech Language Pathology  ACUTE REHABILITATION--DAILY PROGRESS NOTE            ADMITTING DIAGNOSIS: Acute CV[facility]brovascular accident[facility] [I63.9]     SUMMARY OF EVALUATION                 DYSPHAGIA DIAGNOSIS:  Minimal/resolving oropharyngeal dysphagia                            DIET RECOMMENDATIONS:  Regular consistency solids with  thin liquids                 FEEDING RECOMMENDATIONS:                           Assistance level:  Set-up is required for all oral intake                             Compensatory strategies recommended: Small bites/sips and No straw     THERAPY RECOMMENDATIONS:                 Ongoing meal endurance analysis to provide diet modification and compensatory strategy implementation to minimize risk of aspiration associated with PO intake     SPEECH PATHOLOGY DIAGNOSIS:    Mild/resolving dysarthria with minimal left labiobuccal weakness. Functional cognitive-linguistic skills for verbal tasks; however, impulsivity reported with execution of functionally-based motor tasks.     THERAPY RECOMMENDATIONS:   Speech Pathology intervention is recommended 3-6 times per week for LOS or when goals are met with emphasis on the following:      Increase speech intelligibility by               -- improving strength/ROM of the facial and lingual musculature to facilitate and sustain accuracy of articulator placement. -- articulation drill training to promote precision of phoneme production at the word and sentence levels (goal met given 90% accuracy of production with unknown context). Identify physical/cognitive/visuospatial impairments, verbalize impact each have on overall level of functional independence, and demonstrate appropriate insight/safety precautions given these limitations with >90% of trials.                                                                                                                                         FIMS SCORES               eventual clearance of oral residuals. Pharyngeal-     Strong coughing observed x2 with coffee (both were latent and appeared to be larger sips). Reiterated importance of single/small sips. Clear vocal quality was maintained throughout. Completed education with the patient regarding type of swallowing impairment. Reviewed current solid/liquid consistency diet recommendations and reinforced need for consistent use of compensatory strategies to ensure safe PO intake. Reviewed aspiration precautions. Encouraged patient to engage SLP in unstructured Q&A session relative to identified deficit areas -- patient indicated understanding of all information provided via satisfactory verbal response. Supervision is required for all meals. LANGUAGE:     River Cade was within functional limits. COGNITION:       Functional cognition for verbal tasks. Safety: To be assessed. Impulsivity reported by staff. SPEECH:     Mild slurring and rapid rate- SLP had to ask for repetition during conversation and cue a slowed speech rate. He was receptive, but minimal independent carryover. SP recommended after discharge:  TBD    Will continue SP intervention as per previously established POC.     Minute Tracking:    Individual therapy:   15 minutes   Concurrent therapy:   0 minutes   Group therapy:     0 minutes  Co-treatment therapy:    0 minutes    Total minutes for 6/27/2020: 15 minutes      Kiara Cruz M.S. [address]  6/27/2020

## 2020-06-28 PROCEDURE — 6370000000 HC RX 637 (ALT 250 FOR IP): Performed by: INTERNAL MEDICINE

## 2020-06-28 PROCEDURE — 1280000000 HC REHAB R&B

## 2020-06-28 PROCEDURE — 6370000000 HC RX 637 (ALT 250 FOR IP): Performed by: PHYSICAL MEDICINE & REHABILITATION

## 2020-06-28 PROCEDURE — 97110 THERAPEUTIC EXERCISES: CPT

## 2020-06-28 PROCEDURE — 97530 THERAPEUTIC ACTIVITIES: CPT

## 2020-06-28 RX ORDER — CYCLOBENZAPRINE HCL 10 MG
5 TABLET ORAL NIGHTLY
Status: DISCONTINUED | OUTPATIENT
Start: 2020-06-28 | End: 2020-07-01 | Stop reason: HOSPADM

## 2020-06-28 RX ADMIN — ATORVASTATIN CALCIUM 80 MG: 80 TABLET, FILM COATED ORAL at 20:35

## 2020-06-28 RX ADMIN — HYDROCODONE BITARTRATE AND ACETAMINOPHEN 1 TABLET: 10; 325 TABLET ORAL at 06:19

## 2020-06-28 RX ADMIN — HYDROCODONE BITARTRATE AND ACETAMINOPHEN 1 TABLET: 10; 325 TABLET ORAL at 18:56

## 2020-06-28 RX ADMIN — ACETAMINOPHEN 1000 MG: 500 TABLET ORAL at 07:56

## 2020-06-28 RX ADMIN — CLOPIDOGREL 75 MG: 75 TABLET, FILM COATED ORAL at 07:56

## 2020-06-28 RX ADMIN — HYDROCODONE BITARTRATE AND ACETAMINOPHEN 1 TABLET: 10; 325 TABLET ORAL at 13:04

## 2020-06-28 RX ADMIN — CYCLOBENZAPRINE 5 MG: 10 TABLET, FILM COATED ORAL at 20:35

## 2020-06-28 RX ADMIN — ASPIRIN 81 MG 81 MG: 81 TABLET ORAL at 07:56

## 2020-06-28 RX ADMIN — ACETAMINOPHEN 1000 MG: 500 TABLET ORAL at 20:35

## 2020-06-28 ASSESSMENT — PAIN SCALES - GENERAL
PAINLEVEL_OUTOF10: 8
PAINLEVEL_OUTOF10: 0
PAINLEVEL_OUTOF10: 6
PAINLEVEL_OUTOF10: 8
PAINLEVEL_OUTOF10: 7
PAINLEVEL_OUTOF10: 4
PAINLEVEL_OUTOF10: 7
PAINLEVEL_OUTOF10: 6
PAINLEVEL_OUTOF10: 7

## 2020-06-28 ASSESSMENT — PAIN - FUNCTIONAL ASSESSMENT
PAIN_FUNCTIONAL_ASSESSMENT: PREVENTS OR INTERFERES SOME ACTIVE ACTIVITIES AND ADLS

## 2020-06-28 ASSESSMENT — PAIN DESCRIPTION - DESCRIPTORS
DESCRIPTORS: ACHING;DISCOMFORT;SORE

## 2020-06-28 ASSESSMENT — PAIN DESCRIPTION - LOCATION
LOCATION: KNEE
LOCATION: BACK;KNEE
LOCATION: BACK;KNEE
LOCATION: KNEE

## 2020-06-28 ASSESSMENT — PAIN DESCRIPTION - PROGRESSION
CLINICAL_PROGRESSION: NOT CHANGED

## 2020-06-28 ASSESSMENT — PAIN DESCRIPTION - ONSET
ONSET: ON-GOING

## 2020-06-28 ASSESSMENT — PAIN DESCRIPTION - FREQUENCY
FREQUENCY: CONTINUOUS

## 2020-06-28 ASSESSMENT — PAIN DESCRIPTION - ORIENTATION
ORIENTATION: RIGHT;LEFT
ORIENTATION: LEFT;RIGHT
ORIENTATION: LOWER;LEFT
ORIENTATION: LOWER;LEFT

## 2020-06-28 ASSESSMENT — PAIN DESCRIPTION - PAIN TYPE
TYPE: CHRONIC PAIN

## 2020-06-28 NOTE — PROGRESS NOTES
OCCUPATIONAL THERAPY DAILY NOTE    Date:2020  Patient Name: Chad Leo  MRN: [id number]  : 1965  [facility]   Referring Practitioner: Jumana Madrid MD  Diagnosis: CVA- R basal ganglia infarction; s/p tPA  Additional Pertinent Hx: +ETOH, OA, hx chronic back pain     Precautions: Fall Risk, Impulsivity, L otis     Functional Assessment:   Date Status AE  Comments   Feeding 20 Minimal Assist      Grooming 20 Minimal Assist      Bathing 20 Minimal Assist      UB Dressing 20 Moderate Assist      LB Dressing 20 Contact Guard Assist      Homemaking  TBA       Functional Transfers / Balance:   Date Status DME  Comments   Sit Balance 20 Supervision   Sitting balance up in w/c and on arm chair    Stand Balance 20 CGA  Standing balance during arm chair to w/c transfers    [] Tub  [] Shower   Transfer 20 Contact Guard Assist      Commode   Transfer 20 Minimal Assist      Functional   Mobility 20 Minimal Assist      Other:         Functional Exercises / Activity:  BUE SROM ex's to increase LUE ROM at all joints while seated at table top then used furniture glider with LUE on skateboard to increase ROM/strength needing assist for follow thru and movement. Massager input for LUE triceps, biceps and forearm to promote active movement/muscular input and patient tolerated well. Pt educated with LUE positioning when seated up in arm chair, w/c and in bed to promote joint integrity while preventing shld subluxation. Sensory / Neuromuscular Re-Education:       Cognitive Skills:   Status Comments   Problem   Solving fair     Memory fair     Sequencing fair     Safety fair       Visual Perception:    Education:  Pt educated on importance with LUE positioning when not wearing sling to promote joint integrity. [] Family teach completed on:    Pain Level: 0/10    Additional Notes:       Patient has made fair  progress during treatment sessions toward set goals.  Therapy emphasis to obtain goals: Strengthening, ROM, Gait Training, Safety Education & Training, Balance Training, Self-Care / ADL, Patient/Caregiver Education & Training, Functional Mobility Training, Neuromuscular Re-education, Equipment Evaluation, Education, & procurement, Home Management Training, Endurance Training, Cognitive Reorientation, Modalities (comment), Positioning    [x] Continue with current OT Plan of care. [] Prepare for Discharge     DISCHARGE RECOMMENDATIONS  Recommended DME:    Post Discharge Care:   []Home Independently  []Home with 24hr Care / Supervision []Home with Partial Supervision []Home with Home Health OT []Home with Out Pt OT []Other: ___   Comments:         Time in Time out Tx Time Breakdown  Variance:   First Session  10:35am 11:20am [x] Individual Tx- 45 Mins  [] Concurrent Tx -  [] Co-Tx -   [] Group Tx -   [] Time Missed -     Second Session   [] Individual Tx-  Mins  [] Concurrent Tx -  [] Co-Tx -   [] Group Tx -   [] Time Missed -     Third Session    [] Individual Tx-  Mins  [] Concurrent Tx -  [] Co-Tx -   [] Group Tx -   [] Time Missed -         Total Tx Time  45 Mins     Molly Desir CASTILLO/L [id number]  I have read the above note and agree with the documentation.   Antonio Orantes OTR/L [id number]

## 2020-06-28 NOTE — PROGRESS NOTES
Therapeutic Recreation Assessment     LEISURE INTEREST SURVEY               Key: P = Past Interest C = Current Interest F = Future Interest     Arts/Crafts:  _P__Mechanical  ___Woodworking    ___ Painting   ___Floral arranging ___ Ceramics         _ __ Knitting                                                     ___ Crocheting        ___Other: ___________      Cooking:  _ _Baking ___Cooking    ___   Other: _______   Comments:       Games:  ___Cards  ___Darts  ___Board games    ___Word games  ___Crossword puzzles    ___Seek-n-find/jumble                   ___Other: _________      Horticulture:  ___Vegetables  P___Flowers  ___House plants                                                     ___Other: ___________      House/Yard Care:  ___Ironing  ___Laundry  ___Cleaning                                                      C___Repairs              ___Lawn care                                                     ___Other: ___________      Music Listening/Playing:  ___Classical       ___Big Band       ___Rock-n-Roll                                                     ___Country          ___R&B             ___Gospel/Hymns                                                     _C__Other: __A variety_________      Outdoor Activities:  ___Hunting          ___Fishing          _P__Camping                                                     ___Other: ___________      Pets/Animals:  _P__Dogs             _P__Cats                ___Fish                                                     ___Birds             ___Livestock         ___Other: _________    Reading:   ___Newspapers  ___Magazines ___Other:stories                                                     ___Other: ___________      Orthodox Activities:  Rastafari: ___________   [employer] Activities: ___________      Shopping:   ___Grocery  ___Clothing  ___Flea market     ___Other: ___________      Socialization: ___Family  ___Friends  ___Neighbors       ___Church ___Volunteer ___Club/Organization                                                     ___Other: ___________    Sports/Exercises:  ___Walking  ___Football  ___Basketball     ___Golf  ___Baseball  ___Tennis       ___Bowling  ___Other: ___________      Traveling:   ___Global  _C__Stateside  _C__Local       ___Other: ___________      TV/Radio:   ___Mysteries  ___Comedies ___Romance                                                     ___Game show       _C__Sports       ___News                                                      ___Talk show          _C__Other: __Movies_________      Other:Christiano would like to be addressed as Jerilyn Siddiqui. Jerilyn Siddiqui identified feelings of being frustrated and depressed. Personal Leisure Profile:   Question Response   Attributes Identify a positive quality: []Ambitious  []Appreciative  []Caring  []Courteous  []Considerate  []Compassionate  []Creative  []Dependable   []Generous  [x]Honest  []Hard working  []Helpful  []Kind  []Eckert   [][place]  []Mannerly  []Patient  []Polite  []Respectful  []Sociable  []Sense of humor  []Thoughtful  []Other: ___________    You are very good at Mechanical Work   Awareness Why do you participate in your leisure interest: []Competition  []Creativity  []Concentration  []Exercise  []Enjoyment  []Fun  []Hand/eye Coordination  []Increase confidence  []Learning a new skill  [x]Relaxation  []Social Interaction  []Supporting one another  []Thinking  []Other: ___________    Are your leisure activities limited at this time? [x]Yes  []No  Comments: _________    How often do you participate in your leisure interest? 2-3 x a week   Resources Name a restaurant, store or business you frequent? [facility] / [address] [place] [address] When are you most happy?  Being around nature     Barriers to Leisure Participation:  []Visual   []Eyak  []Cognition/Communication  []Motivation  []Financial  []Transportation  []Time Constraints  [x]Physical Ability  []Leisure Awareness  []Drug/Alcohol  []Other: ___________    Recommendations:  []Group Session  [x]Individual Session  []Client Refused Services  []No Therapy  []Other: ___________    Objectives:  []Establish adaptations for leisure involvement   []Increase Self worth/self esteem  []Community reintegration training   [x]Social interaction  [x]Leisure participation  []Other: ___________    Goals for Therapeutic Recreation Services:   Social Interaction:   Admission Functional Bowie Measure: ____6_______  Discharge Functional Bowie Measure: _____6_____   Other:________________________________      Leisure Choice/ Increase Lily Quality of Life: To participate in 1 premorbid leisure activities of choice by discharge to increase leisure choice and independence thus increasing the overall quality of his/her life. Socialization/Social Interaction: To increase social interaction skills by introducing self 1 x per week at the beginning of TR session. Leisure Activity Tolerance: To increase leisure tolerance by attending 1 leisure activities per week for 15 minutes with active participation. Self Expression: To participate in 1 leisure activity(s) of choice, identifying 1 benefits to leisure participation. Leisure Skills: To increase leisure skills by displaying the ability to participate in 1 new leisure activities by discharge. Self esteem/confidence: To complete 1 leisure activities with success 1 x  by discharge. Kate Wooten

## 2020-06-29 ENCOUNTER — TELEPHONE (OUTPATIENT)
Dept: PHYSICAL MEDICINE AND REHAB | Age: 55
End: 2020-06-29

## 2020-06-29 PROCEDURE — 1280000000 HC REHAB R&B

## 2020-06-29 PROCEDURE — 6370000000 HC RX 637 (ALT 250 FOR IP): Performed by: PHYSICAL MEDICINE & REHABILITATION

## 2020-06-29 PROCEDURE — 6370000000 HC RX 637 (ALT 250 FOR IP): Performed by: INTERNAL MEDICINE

## 2020-06-29 PROCEDURE — 97530 THERAPEUTIC ACTIVITIES: CPT

## 2020-06-29 PROCEDURE — 92526 ORAL FUNCTION THERAPY: CPT

## 2020-06-29 PROCEDURE — 97110 THERAPEUTIC EXERCISES: CPT

## 2020-06-29 PROCEDURE — 97112 NEUROMUSCULAR REEDUCATION: CPT

## 2020-06-29 PROCEDURE — 97535 SELF CARE MNGMENT TRAINING: CPT

## 2020-06-29 PROCEDURE — 97124 MASSAGE THERAPY: CPT

## 2020-06-29 PROCEDURE — 97032 APPL MODALITY 1+ESTIM EA 15: CPT

## 2020-06-29 RX ORDER — BACITRACIN, NEOMYCIN, POLYMYXIN B 400; 3.5; 5 [USP'U]/G; MG/G; [USP'U]/G
OINTMENT TOPICAL 2 TIMES DAILY
Status: DISCONTINUED | OUTPATIENT
Start: 2020-06-29 | End: 2020-07-01 | Stop reason: HOSPADM

## 2020-06-29 RX ADMIN — BACITRACIN ZINC, NEOMYCIN SULFATE, POLYMYXIN B SULFATE: 3.5; 5000; 4 OINTMENT TOPICAL at 09:01

## 2020-06-29 RX ADMIN — HYDROCODONE BITARTRATE AND ACETAMINOPHEN 1 TABLET: 10; 325 TABLET ORAL at 13:23

## 2020-06-29 RX ADMIN — CLOPIDOGREL 75 MG: 75 TABLET, FILM COATED ORAL at 09:01

## 2020-06-29 RX ADMIN — HYDROCODONE BITARTRATE AND ACETAMINOPHEN 1 TABLET: 10; 325 TABLET ORAL at 07:29

## 2020-06-29 RX ADMIN — ACETAMINOPHEN 650 MG: 325 TABLET ORAL at 16:35

## 2020-06-29 RX ADMIN — ACETAMINOPHEN 1000 MG: 500 TABLET ORAL at 09:01

## 2020-06-29 RX ADMIN — ACETAMINOPHEN 1000 MG: 500 TABLET ORAL at 20:15

## 2020-06-29 RX ADMIN — ATORVASTATIN CALCIUM 80 MG: 80 TABLET, FILM COATED ORAL at 20:12

## 2020-06-29 RX ADMIN — BACITRACIN ZINC, NEOMYCIN SULFATE, POLYMYXIN B SULFATE: 3.5; 5000; 4 OINTMENT TOPICAL at 20:13

## 2020-06-29 RX ADMIN — HYDROCODONE BITARTRATE AND ACETAMINOPHEN 1 TABLET: 10; 325 TABLET ORAL at 21:53

## 2020-06-29 RX ADMIN — ASPIRIN 81 MG 81 MG: 81 TABLET ORAL at 09:01

## 2020-06-29 RX ADMIN — CYCLOBENZAPRINE 5 MG: 10 TABLET, FILM COATED ORAL at 20:12

## 2020-06-29 ASSESSMENT — PAIN SCALES - GENERAL
PAINLEVEL_OUTOF10: 8
PAINLEVEL_OUTOF10: 8
PAINLEVEL_OUTOF10: 6
PAINLEVEL_OUTOF10: 4
PAINLEVEL_OUTOF10: 7
PAINLEVEL_OUTOF10: 9
PAINLEVEL_OUTOF10: 4
PAINLEVEL_OUTOF10: 5
PAINLEVEL_OUTOF10: 9
PAINLEVEL_OUTOF10: 9

## 2020-06-29 ASSESSMENT — PAIN DESCRIPTION - ONSET
ONSET: ON-GOING

## 2020-06-29 ASSESSMENT — PAIN DESCRIPTION - LOCATION
LOCATION: KNEE

## 2020-06-29 ASSESSMENT — PAIN DESCRIPTION - PROGRESSION
CLINICAL_PROGRESSION: NOT CHANGED
CLINICAL_PROGRESSION: NOT CHANGED

## 2020-06-29 ASSESSMENT — PAIN DESCRIPTION - DESCRIPTORS
DESCRIPTORS: ACHING;DISCOMFORT
DESCRIPTORS: ACHING;DISCOMFORT;SORE
DESCRIPTORS: CONSTANT;DISCOMFORT;DULL;ACHING

## 2020-06-29 ASSESSMENT — PAIN DESCRIPTION - ORIENTATION
ORIENTATION: RIGHT;LEFT

## 2020-06-29 ASSESSMENT — PAIN DESCRIPTION - FREQUENCY
FREQUENCY: CONTINUOUS
FREQUENCY: CONTINUOUS

## 2020-06-29 ASSESSMENT — PAIN - FUNCTIONAL ASSESSMENT: PAIN_FUNCTIONAL_ASSESSMENT: PREVENTS OR INTERFERES SOME ACTIVE ACTIVITIES AND ADLS

## 2020-06-29 ASSESSMENT — PAIN DESCRIPTION - PAIN TYPE
TYPE: CHRONIC PAIN

## 2020-06-29 NOTE — PROGRESS NOTES
Swallowing                          Current Status             5--Supervision               Short Term Goal         6--Modified Independent                       Long Term Goal          6--Modified Independent                Receptive                          Current Status             7--Independent                         Short Term Goal         7--Independent                         Long Term Goal          7--Independent                Expressive                          Current Status             6--Modified Independent                       Short Term Goal         7--Independent                         Long Term Goal          7--Independent                Social Interaction                          Current Status             7--Independent                         Short Term Goal         7--Independent                         Long Term Goal          7--Independent                                                               Problem Solving                          Current Status             5--Supervision               Short Term Goal         6--Modified Independent                       Long Term Goal          6--Modified Independent                          Memory                          Current Status             5--Supervision               Short Term Goal         6--Modified Independent                       Long Term Goal          6--Modified Independent                                                      SWALLOWING:      Diet:  Regular consistency solids with thin liquids     Pt seen in dining room due to observations coughing on thin liquids prior to this date. Reviewed need for slow rate of intake and regulated bite size prior to initiation of PO intake. Diet:   Regular consistency solids with  thin liquids     Oral prep/oral-     No oral containment issues were identified. Adequate oral prep and A-P propulsion of bolus were noted with eventual clearance of oral residuals.       Pharyngeal- Pt reported drinking thin liquids in smaller amounts during lunch. Throat clearing on thin liquids noted x2. Will begin laryngeal elevation exercises. Clear vocal quality was maintained throughout. Completed education with the patient regarding type of swallowing impairment. Reviewed current solid/liquid consistency diet recommendations and reinforced need for consistent use of compensatory strategies to ensure safe PO intake. Reviewed aspiration precautions. Encouraged patient to engage SLP in unstructured Q&A session relative to identified deficit areas -- patient indicated understanding of all information provided via satisfactory verbal response. Supervision is required for all meals. LANGUAGE:     Kiara Yu was within functional limits. COGNITION:       Functional cognition for verbal tasks. Safety:  Fair, impulsive at times     SPEECH:     Pt completed oral motor exercises and articulation drills at the sentence level using compensatory strategies of slowing down and over articulating. Pt reported that he hadn't completed oral motor exercises in several days. Pt was encouraged to keep doing exercises independently. SP recommended after discharge:  TBD    Will continue SP intervention as per previously established POC.     Minute Tracking:    Individual therapy:   15 minutes (oral motor (2:30-2:45)  Concurrent therapy:  15 minutes (swallowing 11:45-12:00)  Group therapy:     0 minutes  Co-treatment therapy:    0 minutes    Total minutes for 6/29/2020: 15 minutes      Noel Romeo. speech pathology graduate extern

## 2020-06-29 NOTE — PROGRESS NOTES
Physical Therapy    Facility/Department: [address] [facility]  Treatment Note    NAME: Marcial Salinas  : 1965  MRN: [id number]    Date of Service: 2020  Evaluating Therapist: Francisco Javier Lopze DPT    ROOM: 8311/7752-Y  DIAGNOSIS: Acute CVA  PMH: To ED for L sided weakness/numbness. Found to have occlusion of left common carotid artery. tPA administered. CTA performed today showed an evolving late acute infarct of the right basal ganglia. Noted fall during hospitalization. PMH includes HTN, EtOH abuse, chronic back/knee pain     PRECAUTIONS: Left hemiparesis, falls, chair/bed alarm, bilateral knee OA braces     Pt lives alone in a 3rs floor apartment with elevator access. Pt ambulated with no AD PTA. Pt independent for ADL performance. Initial Evaluation  20 AM PM Short Term Goals Long Term Goals    Was pt agreeable to Eval/treatment? yes yes yes     Does pt have pain?  Chronic knee/back pain  Chronic back/knee pain  Chronic back/knee pain      Bed Mobility  Rolling: sba  Supine to sit: sba  Sit to supine: sba  Scooting: sba Mod I   sup  mod I    Transfers Sit to stand: cgA  Stand to sit: cgA  Stand pivot: cgA with LBQC  Sit to stand: sba  Stand to sit: sba  Stand pivot: sba without AD Sit to stand: sba  Stand to sit: sba  Stand pivot: sba without AD Sup with AAD Mod I with AAD   Ambulation    50 feet with LBQC with cgA (L DF ace wrap) 150' without AD sba (, bilateral OA braces) 150' without AD sba (, bilateral OA braces) 300 feet with AAD with sup >500 feet with AAD with mod I   Walking 10 feet on uneven surface 10 feet with LBQC with cgA       Wheel Chair Mobility n/a       Car Transfers Raysa sba  sup Mod I   Stair negotiation: ascended and descended  8 steps with R rail with Raysa 12 steps with R rail with sba (recip patterning up,NR patterning down)  12 steps with R rail with sba (recip patterning up,NR patterning down)    12 steps with L HR cg/sba (sideways approach ascending with R UE)   >12 steps with one rail with sup >12 steps with one rail with mod I   Curb Step:   ascended and descended 7.5 inch step with LBQC and Raysa 7.5 in curb step without AD cgA 7.5 in curb step x2 reps without AD cgA/sba 7.5 inch step with AAD and sup 7.5 inch step with AAD and mod I    Picking up object off the floor Picked up 1# with Raysa        BLE ROM WFL       BLE Strength R LE: 4+/5    L LE: hip 3-/5, knee 3-/5, ankle 2-/5 R LE: 4+/5    L LE: hip 4-/5, knee 4/5, ankle 4-/5      Balance  Sitting: sup  Standing: cg/Raysa with LBQC Sitting: Independent   Standing: cg/sba without AD      Date Family Teach Completed        Is additional Family Teaching Needed? Y or N        Hindering Progress L hemiparesis, balance       PT recommended ELOS 2-3 weeks       Team's Discharge Plan        Therapist at Team Meeting          Therapeutic Exercise:   AM: 5x STS transfer at w/c, emphasis on technique and hand placement x2 reps  Bridging 2x10, L SLR 2x10  Stepping over various height quad canes without UE support: NR patterning 10' x2, alternating NR patterning 10' x2, side stepping 10' x2 with occasional sway with L SLS  airex foam: NBOS EO/EC/EO with perturbations with increased sway/LOB to the left  Tapping various height objects without UE support- difficulty with SLS on L LE and reaching midline     PM: 5x STS transfer at w/c, emphasis on technique and hand placement x2 reps  Agility ladder: in in out (icky shuffle) 10 'x 2, in in out out 10' x2 reps with verbal cues for adhere to patterning     Patient education  Pt educated on weight shifting with ambulation, hand placement with transfers, ambulation/stair patterning and technique     Patient response to education:   Pt verbalized understanding Pt demonstrated skill Pt requires further education in this area   yes Yes with vcA yes     Additional Comments: Significant LE improvement noted in involved L LE leading to reduced assistance with all functional mobility.  Continues to require occasional verbal cues to emphasize heel strike on L LE with mobility due to inconsistency with heel strike due to fatigue/weakness over longer distances. Balance deficits persisting with L SLS tasks however planning to continue to challenge SLS. In PM, continued to progress mobility and challenge balance as able. Trial stair negotiation with L HR (sideways approach with R UE) completed with NR patterning with no buckling or instability. Will continue to challenge balance/strength as able. Chair/bed alarm: yes    AM  Time in: 0915  Time out: 1000    PM  Time in: 1300  Time out: 1345    Pt is making fair progress toward established Physical Therapy goals. Continue with physical therapy current plan of care.     Michelle Kenyon DPT  [id number]

## 2020-06-29 NOTE — TELEPHONE ENCOUNTER
Patient called the office stating he missed last week's appointment for the left knee Depo-Medrol injection due to a stroke, and is still in the hospital. He said he will reschedule when he's able to.

## 2020-06-29 NOTE — PROGRESS NOTES
Physical Therapy    Facility/Department: [address] [facility]  Weekly Progress Note     NAME: Hiro White  : 1965  MRN: [id number]    Date of Service: 2020  Evaluating Therapist: Ardyth Goldberg, DPT    ROOM: [address] [place] [address]  DIAGNOSIS: Acute CVA  PMH: To ED for L sided weakness/numbness. Found to have occlusion of left common carotid artery. tPA administered. CTA performed today showed an evolving late acute infarct of the right basal ganglia. Noted fall during hospitalization. PMH includes HTN, EtOH abuse, chronic back/knee pain     PRECAUTIONS: Left hemiparesis, falls, chair/bed alarm     Pt lives alone in a 3rs floor apartment with elevator access. Pt ambulated with no AD PTA. Pt independent for ADL performance.      Initial Evaluation  20 Comments Short Term Goals Long Term Goals    Bed Mobility  Rolling: sba  Supine to sit: sba  Sit to supine: sba  Scooting: sba Mod I (all aspects)  sup  mod I    Transfers Sit to stand: cgA  Stand to sit: cgA  Stand pivot: cgA with LBQC  Sit to stand: sba  Stand to sit: sba  Stand pivot: sba without AD  Sup with AAD Mod I with AAD   Ambulation    50 feet with LBQC with cgA (L DF ace wrap) 150' without AD sba  Bilateral OA braces 300 feet with AAD with sup >500 feet with AAD with mod I   Wheel Chair Mobility n/a n/a      Car Transfers Raysa sba  sup Mod I   Stair negotiation: ascended and descended  8 steps with R rail with Raysa 12 steps with R rail with sba (recip patterning up, NR patterning down)  >12 steps with one rail with sup >12 steps with one rail with mod I   Curb Step:   ascended and descended 7.5 inch step with LBQC and Raysa 7.5 in curb step without AD cgA  7.5 inch step with AAD and sup 7.5 inch step with AAD and mod I    BLE ROM WFL WFL      BLE Strength R LE: 4+/5    L LE: hip 3-/5, knee 3-/5, ankle 2-/5 R LE: 4+/5    L LE: hip 4-/5, knee 4/5, ankle 4-/5      Balance  Sitting: sup  Standing: cg/Raysa with LBQC Sitting: Independent   Standing: cg/sba without AD      Date Family Teach Completed        Is additional Family Teaching Needed? Y or N        Hindering Progress L hemiparesis, balance L hemiparesis, balance      PT recommended ELOS 2-3 weeks 1 week      Team's Discharge Plan  1 week pending insurance approval       Therapist at Team Meeting  Candice Mcdonald [place], [place] [id number]          Date:  6/29/20  Supporting factors:  Improving NM return, high PLOF, age  Barriers to discharge:  L hemiparesis (UE more impaired than LE), balance  Additional comments:  Significant improvement in L LE strength leading to progressing to no AD with all mobility. Balance deficits still persist with occasional inconsistency with L LE toe clearance during swing through, however do not anticipate requiring AFO at this time. May benefit from FT prior to d/c home.      DME:  TBD (anticipate no AD)  After Care:  Odette Jett DPT   [id number]

## 2020-06-29 NOTE — PROGRESS NOTES
°C), temperature source Temporal, resp. rate 16, height 5' 10\" (1.778 m), weight 145 lb (65.8 kg), SpO2 97 %. Vital signs are normal.    Output: Producing urine and producing stool. Lungs:  Normal effort and normal respiratory rate. There are decreased breath sounds. Heart: Normal rate. Regular rhythm. S1 normal and S2 normal.    Abdomen: Abdomen is soft. Bowel sounds are normal.   There is no abdominal tenderness. Extremities: Normal range of motion. Neurological: Patient is alert. (2/5 LUE  4/5 LLE). Assessment:      Date   Status   AE    Comments     Feeding   6/25/20   Minimal Assist              Grooming   6/25/20   Minimal Assist              Bathing   6/25/20   Minimal Assist              UB Dressing   6/25/20   Moderate Assist              LB Dressing   6/25/20   Contact Guard Assist              Homemaking       TBA                  Functional Transfers / Balance:      Date Status DME  Comments   Sit Balance 6/28/20   Supervision    Sitting balance up in w/c and on arm chair    Stand Balance 6/28/20   CGA   Standing balance during arm chair to w/c transfers    []? Tub  []? Shower   Transfer 6/25/20   Contact Guard Assist          Commode   Transfer 6/25/20   Minimal Assist          Functional   Mobility 6/25/20   Minimal Assist          Other:                Assessment:    Condition: In stable condition. Improving.   (CVA). Plan:   Encourage ambulation. (Beginning to move left arm better  He is unaware of it due to left neglect  Will add e-stim  We will add recreation).        Crystal Layne MD  6/29/2020

## 2020-06-29 NOTE — PROGRESS NOTES
OCCUPATIONAL THERAPY DAILY NOTE    Date:2020  Patient Name: Mary Pruitt  MRN: [id number]  : 1965  [facility]   Referring Practitioner: Martin Henning MD  Diagnosis: CVA- R basal ganglia infarction; s/p tPA  Additional Pertinent Hx: +ETOH, OA, hx chronic back pain     Precautions: Fall Risk, Impulsivity, L otis     Functional Assessment:   Date Status AE  Comments   Feeding 20 Minimal Assist      Grooming 20 Minimal Assist      Bathing 20 Minimal Assist      UB Dressing 20 Moderate Assist      LB Dressing 20 Contact Guard Assist   Donned shoes seated EOB. SBA for safety. Homemaking  TBA       Functional Transfers / Balance:   Date Status DME  Comments   Sit Balance 20 Supervision      Stand Balance 20 SBA     [x] Tub  [] Shower   Transfer 20 SBA Extended tub bench Pt completes both step over tub transfer and extended tub bench transfer. SBA for safety. Pt states he feels more comfortable with extended tub bench. Commode   Transfer 20 SBA     Functional   Mobility 20 SBA No device    Other:  Recliner, couch   20 SBA       Functional Exercises / Activity:  Arm Skate on figure 8 board with occasional muscle tapping to increase AROM and strength- good skill demo'd. Towel stretches to increase BUE coordination and LUE ROM clockwise, counterclockwise and forward/backward. Sensory / Neuromuscular Re-Education:  PROM 1 X 10 on all planes of LUE to increase ROM and maintain joint mobility  Muscle tapping provided to bicep to increase elbow flexion and extension- poor response noted. Retrograde massage to LUE to decrease edema  and followed by PROM in digits 1-5. E-stim completed to LUE X 20 Mins on intensity of 20 to encourage flexion for independence with ADLs. Fair response noted.       Cognitive Skills:   Status Comments   Problem   Solving fair     Memory fair     Sequencing fair     Safety fair       Visual Perception:    Education:  Pt educated on importance with LUE positioning when not wearing sling to promote joint integrity. [] Family teach completed on:    Pain Level: 0/10    Additional Notes:       Patient has made fair  progress during treatment sessions toward set goals. Therapy emphasis to obtain goals: Strengthening, ROM, Gait Training, Safety Education & Training, Balance Training, Self-Care / ADL, Patient/Caregiver Education & Training, Functional Mobility Training, Neuromuscular Re-education, Equipment Evaluation, Education, & procurement, Home Management Training, Endurance Training, Cognitive Reorientation, Modalities (comment), Positioning    [x] Continue with current OT Plan of care. [] Prepare for Discharge     DISCHARGE RECOMMENDATIONS  Recommended DME:    Post Discharge Care:   []Home Independently  []Home with 24hr Care / Supervision []Home with Partial Supervision []Home with Home Health OT []Home with Out Pt OT []Other: ___   Comments:         Time in Time out Tx Time Breakdown  Variance:   First Session  10:00 10:45 [x] Individual Tx- 45 Mins  [] Concurrent Tx -  [] Co-Tx -   [] Group Tx -   [] Time Missed -     Second Session 1:45 2:30 [x] Individual Tx-  Mins  [] Concurrent Tx - 45 Mins  [] Co-Tx -   [] Group Tx -   [] Time Missed -     Third Session    [] Individual Tx-  Mins  [] Concurrent Tx -  [] Co-Tx -   [] Group Tx -   [] Time Missed -         Total Tx Time  90 Mins     Erasmo CASTILLO/L [id number]  I have read the above note and agree with the documentation.   Laura Groves OTR/L [id number]

## 2020-06-30 PROCEDURE — 97112 NEUROMUSCULAR REEDUCATION: CPT

## 2020-06-30 PROCEDURE — 9900000072 HC NEUROMUSCULAR RE-EDUCATION (SELF-PAY)

## 2020-06-30 PROCEDURE — 6370000000 HC RX 637 (ALT 250 FOR IP): Performed by: PHYSICAL MEDICINE & REHABILITATION

## 2020-06-30 PROCEDURE — 1280000000 HC REHAB R&B

## 2020-06-30 PROCEDURE — 97530 THERAPEUTIC ACTIVITIES: CPT

## 2020-06-30 PROCEDURE — 97110 THERAPEUTIC EXERCISES: CPT

## 2020-06-30 PROCEDURE — 6370000000 HC RX 637 (ALT 250 FOR IP): Performed by: INTERNAL MEDICINE

## 2020-06-30 PROCEDURE — 92526 ORAL FUNCTION THERAPY: CPT

## 2020-06-30 PROCEDURE — 97535 SELF CARE MNGMENT TRAINING: CPT

## 2020-06-30 RX ORDER — NICOTINE 21 MG/24HR
1 PATCH, TRANSDERMAL 24 HOURS TRANSDERMAL DAILY
Status: DISCONTINUED | OUTPATIENT
Start: 2020-07-01 | End: 2020-07-01 | Stop reason: HOSPADM

## 2020-06-30 RX ADMIN — ATORVASTATIN CALCIUM 80 MG: 80 TABLET, FILM COATED ORAL at 20:32

## 2020-06-30 RX ADMIN — HYDROCODONE BITARTRATE AND ACETAMINOPHEN 1 TABLET: 10; 325 TABLET ORAL at 18:57

## 2020-06-30 RX ADMIN — CLOPIDOGREL 75 MG: 75 TABLET, FILM COATED ORAL at 07:38

## 2020-06-30 RX ADMIN — HYDROCODONE BITARTRATE AND ACETAMINOPHEN 1 TABLET: 10; 325 TABLET ORAL at 07:13

## 2020-06-30 RX ADMIN — ACETAMINOPHEN 1000 MG: 500 TABLET ORAL at 20:33

## 2020-06-30 RX ADMIN — ACETAMINOPHEN 1000 MG: 500 TABLET ORAL at 07:38

## 2020-06-30 RX ADMIN — BACITRACIN ZINC, NEOMYCIN SULFATE, POLYMYXIN B SULFATE: 3.5; 5000; 4 OINTMENT TOPICAL at 07:37

## 2020-06-30 RX ADMIN — BACITRACIN ZINC, NEOMYCIN SULFATE, POLYMYXIN B SULFATE: 3.5; 5000; 4 OINTMENT TOPICAL at 20:32

## 2020-06-30 RX ADMIN — ASPIRIN 81 MG 81 MG: 81 TABLET ORAL at 07:37

## 2020-06-30 RX ADMIN — HYDROCODONE BITARTRATE AND ACETAMINOPHEN 1 TABLET: 10; 325 TABLET ORAL at 12:57

## 2020-06-30 RX ADMIN — CYCLOBENZAPRINE 5 MG: 10 TABLET, FILM COATED ORAL at 20:32

## 2020-06-30 ASSESSMENT — PAIN SCALES - GENERAL
PAINLEVEL_OUTOF10: 5
PAINLEVEL_OUTOF10: 5
PAINLEVEL_OUTOF10: 8
PAINLEVEL_OUTOF10: 9
PAINLEVEL_OUTOF10: 5

## 2020-06-30 ASSESSMENT — PAIN DESCRIPTION - DESCRIPTORS
DESCRIPTORS: ACHING;DISCOMFORT
DESCRIPTORS: ACHING;DISCOMFORT
DESCRIPTORS: ACHING;DISCOMFORT;SORE

## 2020-06-30 ASSESSMENT — PAIN - FUNCTIONAL ASSESSMENT
PAIN_FUNCTIONAL_ASSESSMENT: PREVENTS OR INTERFERES SOME ACTIVE ACTIVITIES AND ADLS

## 2020-06-30 ASSESSMENT — PAIN DESCRIPTION - ONSET
ONSET: ON-GOING

## 2020-06-30 ASSESSMENT — PAIN DESCRIPTION - PROGRESSION
CLINICAL_PROGRESSION: NOT CHANGED
CLINICAL_PROGRESSION: NOT CHANGED

## 2020-06-30 ASSESSMENT — PAIN DESCRIPTION - LOCATION
LOCATION: KNEE

## 2020-06-30 ASSESSMENT — PAIN DESCRIPTION - FREQUENCY
FREQUENCY: CONTINUOUS

## 2020-06-30 ASSESSMENT — PAIN DESCRIPTION - PAIN TYPE
TYPE: CHRONIC PAIN

## 2020-06-30 ASSESSMENT — PAIN DESCRIPTION - ORIENTATION
ORIENTATION: RIGHT;LEFT

## 2020-06-30 NOTE — CARE COORDINATION
Team :  Team meeting this date. Rehab progress discussed with patient at bedside. .  Team recommends an additional week in patient rehab pending authorization and base on functional and clinical barriers related to left sided hemiparesis. Left arm with only trace movement is biggest barrier to functional independence. Patient has good insight and is willing to work in patient for as long as able toward goals. Estimated dc  7/8     DC Plan:  Home alone Out patient therapy at [facility] per patient choice this date. DME :  Will need extended tub bench. Submitted order to [facility] DME for insurance verification of coverage. Will discuss private pay if necessary. Family teach :  Patient will be home alone  . Question need. Will follow .

## 2020-06-30 NOTE — PATIENT CARE CONFERENCE
transfers goal: supervision  Long term car transfers goal:Modified independent    Stairs:   Current level : 12 with 1 rail at standby assist  Short term stairs goal: 12 at supervision  Long term stairs goal: 12 at Modified independent    Lower Extremity Strength Issues:   R LE: 4+/5    L LE: hip 4-/5, knee 4/5, ankle 4-/5    Other comments: standing balance is contact guard/standby assist without a device      OCCUPATIONAL THERAPY:      Tub/shower:   Current level: standby assist with extended tub bench  Short term tub/shower goal: supervision  Long term tub/shower goal: supervision      Feeding:  Current level: min assist  Short term feeding goal: supervision  Long term feeding goal: Modified independent    Grooming:   Current level: min assist  Short term grooming goal: supervision  Long term grooming goal: Modified independent    Bathing:  Current level: standby assist, impulsive  Short term bathing goal: supervision  Long term bathing goal: supervision    Homemaking:   Current level: to be assessed    Upper body dressing:  Current level: supervision  Short term upper body dressing goal: Modified independent  Long term upper body dressing goal: independent    Lower body dressing:  Current level: min assist  Short term lower body dressing goal: Contact guard assist  Long term lower body dressing goal: Modified independent    Toilet transfer:   Current level: standby assist, no device  Short term toilet transfer goal: supervision  Long term toilet transfer goal: independent      Upper body strength issues: e- stim started, doing robotic in motion arm  also      SPEECH THERAPY  Swallowing:  Current level:  supervision-staff noting coughing on thin liquids  Short term swallowing goal: Modified independent  Long term swallowing Goal: Modified independent    Comprehension:   Current level: independent  Short term comprehension goal: independent  Long term comprehension goal: independent    Expression:   Current level: Modified independent  Short term expression goal: independent  Long term expression goal: independent    Problem solving:   Current level: supervision  Short term problem solving goal: Modified independent  Long term problem solving goal: Modified independent    Memory:  Current level: supervision  Short term memory goal: Modified independent  Long term memory goal: Modified independent    Social interaction:  independent    Safety awareness: fair    Comments: participates in recreation therapy    Patient/family's personal goals: \"move my left arm and hand\"  Factors supporting goal achievement:  participating and making gains  Factors hindering goal achievement:  weakness in left upper      Discharge Plan   Estimated Length of Stay: 1 week-7/7     Destination: home  Services at Discharge: home health services  Equipment at Discharge: to be assessed      INTERDISCIPLINARY TEAM/PHYSICIAN RECOMMENDATION AND/OR REVISIONS OF PLAN OF CARE:  continue working towards goals, schedule family education for end of this week    I approve the established interdisciplinary plan of care as documented within the medical record of Laurel Prem. Electronically signed by Cleveland Hale RN  on 6/30/2020 at 9:10 AM  The following interdisciplinary team members were present:  NICK Li, LSW  Elton Guzmán, DPT  Rebecca Aus, OTR  Jeffy Huffman, Giseleite Nach[address] 87, CCC-SLP

## 2020-06-30 NOTE — PROGRESS NOTES
ADLs. Pt completes X 240 reps with AAROM and X 48 reps in gravity elminiated/AROM. Pt demonstrates an increased tolerance and endurance by increasing reps. Cognitive Skills:   Status Comments   Problem   Solving fair     Memory fair     Sequencing fair     Safety fair       Visual Perception:    Education:  Pt educated on importance with LUE positioning when not wearing sling to promote joint integrity. Pt educated on safety in room to stand with supervision only. [] Family teach completed on:    Pain Level: 0/10    Additional Notes:       Patient has made fair  progress during treatment sessions toward set goals. Therapy emphasis to obtain goals: Strengthening, ROM, Gait Training, Safety Education & Training, Balance Training, Self-Care / ADL, Patient/Caregiver Education & Training, Functional Mobility Training, Neuromuscular Re-education, Equipment Evaluation, Education, & procurement, Home Management Training, Endurance Training, Cognitive Reorientation, Modalities (comment), Positioning    [x] Continue with current OT Plan of care. [] Prepare for Discharge     DISCHARGE RECOMMENDATIONS  Recommended DME:    Post Discharge Care:   []Home Independently  []Home with 24hr Care / Supervision []Home with Partial Supervision []Home with Home Health OT []Home with Out Pt OT []Other: ___   Comments:         Time in Time out Tx Time Breakdown  Variance:   First Session  8:05 8:55 [x] Individual Tx- 50 Mins  [] Concurrent Tx -  [] Co-Tx -   [] Group Tx -   [] Time Missed -     Second Session 1:45 2:30 [x] Individual Tx-  45 MIns  [] Concurrent Tx -   [] Co-Tx -   [] Group Tx -   [] Time Missed -     Third Session    [] Individual Tx-  Mins  [] Concurrent Tx -  [] Co-Tx -   [] Group Tx -   [] Time Missed -         Total Tx Time  95 Mins     Birgit Gil CASTILLO/L [id number]  I have read the above note and agree with the documentation.   Dion Lees OTR/L [id number]

## 2020-06-30 NOTE — PROGRESS NOTES
°C), temperature source Oral, resp. rate 16, height 5' 10\" (1.778 m), weight 145 lb (65.8 kg), SpO2 92 %. Vital signs are normal.    Output: Producing urine and producing stool. Lungs:  Normal effort and normal respiratory rate. Breath sounds clear to auscultation. Heart: Normal rate. Regular rhythm. S1 normal and S2 normal.    Abdomen: Abdomen is soft. Bowel sounds are normal.   There is no abdominal tenderness. Extremities: Normal range of motion. Neurological: Patient is alert. (1/5 LUE  4/5 LLE). Assessment:    Condition: In stable condition. Improving.   (CVA). Plan:   Encourage ambulation. (Pain in the left arm  Beginning to get some movement back  Doing e-stim  The left lower is improving nicely  Will discuss further in team today).        Federica Soto MD  6/30/2020

## 2020-06-30 NOTE — PROGRESS NOTES
Speech Language Pathology  ACUTE REHABILITATION--DAILY PROGRESS NOTE            ADMITTING DIAGNOSIS: Acute CVA (ce[facility]) ([facility] [I63.9]     SUMMARY OF EVALUATION                 DYSPHAGIA DIAGNOSIS:  Minimal/resolving oropharyngeal dysphagia                            DIET RECOMMENDATIONS:  Regular consistency solids with  thin liquids                 FEEDING RECOMMENDATIONS:                           Assistance level:  Set-up is required for all oral intake                             Compensatory strategies recommended: Small bites/sips and No straw     THERAPY RECOMMENDATIONS:                 Ongoing meal endurance analysis to provide diet modification and compensatory strategy implementation to minimize risk of aspiration associated with PO intake     SPEECH PATHOLOGY DIAGNOSIS:    Mild/resolving dysarthria with minimal left labiobuccal weakness. Functional cognitive-linguistic skills for verbal tasks; however, impulsivity reported with execution of functionally-based motor tasks.     THERAPY RECOMMENDATIONS:   Speech Pathology intervention is recommended 3-6 times per week for LOS or when goals are met with emphasis on the following:      Increase speech intelligibility by               -- improving strength/ROM of the facial and lingual musculature to facilitate and sustain accuracy of articulator placement. -- articulation drill training to promote precision of phoneme production at the word and sentence levels (goal met given 90% accuracy of production with unknown context). Identify physical/cognitive/visuospatial impairments, verbalize impact each have on overall level of functional independence, and demonstrate appropriate insight/safety precautions given these limitations with >90% of trials.                                                                                                                                         FIMS SCORES               Swallowing                          Current Status             5--Supervision               Short Term Goal         6--Modified Independent                       Long Term Goal          6--Modified Independent                Receptive                          Current Status             7--Independent                         Short Term Goal         7--Independent                         Long Term Goal          7--Independent                Expressive                          Current Status             6--Modified Independent                       Short Term Goal         7--Independent                         Long Term Goal          7--Independent                Social Interaction                          Current Status             7--Independent                         Short Term Goal         7--Independent                         Long Term Goal          7--Independent                                                               Problem Solving                          Current Status             5--Supervision               Short Term Goal         6--Modified Independent                       Long Term Goal          6--Modified Independent                          Memory                          Current Status             5--Supervision               Short Term Goal         6--Modified Independent                       Long Term Goal          6--Modified Independent                                                      SWALLOWING:      Diet:  Regular consistency solids with thin liquids     Pt was not seen in dining room due to lunch being delivered to room today. Engaged patient in modified Shaker exercises (aka CTAR -- Chin Tuck Against Resistance) to accommodate for upright positioning. Patient completed 30 consecutive purposeful chin depressions against a high density closed cell foam block strategically placed beneath chin to target/promote isolated suprahyoid muscle contraction against moderate resistance. Pt was asked to slowly release pressure from the foam at the end of each repetition to reinforce the isokinetic benefit of this exercise. Pt demonstrated fair+ strength, fair+ endurance, and range of motion but was observed to noticeably fatigue near the end of the exercises. Pt demonstrated fair+ ability to isolate his head movement from neck/shoulders when completing the exercises. Education completed with patient, regarding procedural components for completion of this exercise. Pt was encouraged to independently complete the exercise while sitting in bed. Patient indicated understanding of all reviewed information via satisfactory verbal response. LANGUAGE:     Roxana Bean was within functional limits. COGNITION:       Functional cognition for verbal tasks. Safety:  Fair, impulsive at times per staff    SPEECH:     Pt completed oral motor exercises and articulation drills at the sentence level using compensatory strategies of slowing down and over articulating. Pt read paragraph using compensatory strategies with good outcome using compensatory strategies. Pt was encouraged to keep doing exercises independently. SP recommended after discharge:  TBD    Will continue SP intervention as per previously established POC.     Minute Tracking:    Individual therapy:   30 minutes (oral motor/ swallowing 2:45-3:15)  Concurrent therapy:    0 minutes   Group therapy:     0 minutes  Co-treatment therapy:    0 minutes    Total minutes for 6/30/2020: 30 minutes      [place], [place]. speech pathology graduate extern

## 2020-06-30 NOTE — PROGRESS NOTES
Physical Therapy    Facility/Department: [address] [facility]  Treatment Note    NAME: Eliz Goodelor  : 1965  MRN: [id number]    Date of Service: 2020  Evaluating Therapist: Marsha Esteban DPT    ROOM: [address] [place], [place] [address]-V  DIAGNOSIS: Acute CVA  PMH: To ED for L sided weakness/numbness. Found to have occlusion of left common carotid artery. tPA administered. CTA performed today showed an evolving late acute infarct of the right basal ganglia. Noted fall during hospitalization. PMH includes HTN, EtOH abuse, chronic back/knee pain     PRECAUTIONS: Left hemiparesis, falls, chair/bed alarm, bilateral knee OA braces     Pt lives alone in a 3rs floor apartment with elevator access. Pt ambulated with no AD PTA. Pt independent for ADL performance. Initial Evaluation  20 AM PM Short Term Goals Long Term Goals    Was pt agreeable to Eval/treatment? yes yes yes     Does pt have pain?  Chronic knee/back pain  Chronic back/knee pain  Chronic back/knee pain      Bed Mobility  Rolling: sba  Supine to sit: sba  Sit to supine: sba  Scooting: sba Mod I   sup  mod I    Transfers Sit to stand: cgA  Stand to sit: cgA  Stand pivot: cgA with LBQC  Sit to stand: sba  Stand to sit: sba  Stand pivot: sba without AD Sit to stand: sba/sup  Stand to sit: sba  Stand pivot: sba without AD Sup with AAD Mod I with AAD   Ambulation    50 feet with LBQC with cgA (L DF ace wrap) 200' x2 without AD sba (bilateral OA braces)    200' x2 with spc sba (bilateral OA braces) 200' x2 without AD sba (bilateral OA braces) 300 feet with AAD with sup >500 feet with AAD with mod I   Walking 10 feet on uneven surface 10 feet with LBQC with cgA       Wheel Chair Mobility n/a       Car Transfers Raysa  sba sup Mod I   Stair negotiation: ascended and descended  8 steps with R rail with Raysa 12 steps without HR cgA (NR patterning)  12 steps without HR cgA (recip up, NR patterning)    >12 steps with one rail with sup >12 steps with one rail with mod I   Curb Step: home for safety upon d/c. Educated that patients current reciprocal gait patterning will be too quick for a quad cane and instead increase the risk of LOB due to improper quad cane use. Trial spc unsteady to match his reciprocal gait patterning with no cueing for proper patterning and patient reporting feeling more stable. Continued to complete mobility t/o the session without AD to challenge balance and strength. In PM, continued to review all mobility as per above. Occasional cues to slow down, especially with stair negotiation when descending at pt tending to complete with reciprocal patterning leading to cgA on occasion for balance recovery. Continued to challenge balance with higher level balance tasks/SLS. Will continue to progress as able. Chair/bed alarm: yes    AM  Time in: 0915  Time out: 1000    PM  Time in: 1300  Time out: 1345    Pt is making fair progress toward established Physical Therapy goals. Continue with physical therapy current plan of care.     Maxx Singh DPT  [id number]

## 2020-07-01 VITALS
HEIGHT: 70 IN | RESPIRATION RATE: 16 BRPM | DIASTOLIC BLOOD PRESSURE: 73 MMHG | TEMPERATURE: 98.6 F | BODY MASS INDEX: 20.76 KG/M2 | HEART RATE: 85 BPM | OXYGEN SATURATION: 98 % | WEIGHT: 145 LBS | SYSTOLIC BLOOD PRESSURE: 128 MMHG

## 2020-07-01 PROCEDURE — 6370000000 HC RX 637 (ALT 250 FOR IP): Performed by: INTERNAL MEDICINE

## 2020-07-01 PROCEDURE — 97530 THERAPEUTIC ACTIVITIES: CPT

## 2020-07-01 PROCEDURE — 6370000000 HC RX 637 (ALT 250 FOR IP): Performed by: PHYSICAL MEDICINE & REHABILITATION

## 2020-07-01 PROCEDURE — 97032 APPL MODALITY 1+ESTIM EA 15: CPT

## 2020-07-01 RX ORDER — CYCLOBENZAPRINE HCL 5 MG
5 TABLET ORAL NIGHTLY
Qty: 10 TABLET | Refills: 0 | Status: SHIPPED | OUTPATIENT
Start: 2020-07-01 | End: 2020-07-11

## 2020-07-01 RX ORDER — ASPIRIN 81 MG/1
81 TABLET, CHEWABLE ORAL DAILY
Qty: 30 TABLET | Refills: 3 | Status: SHIPPED | OUTPATIENT
Start: 2020-07-02 | End: 2020-07-15 | Stop reason: SDUPTHER

## 2020-07-01 RX ORDER — CLOPIDOGREL BISULFATE 75 MG/1
75 TABLET ORAL DAILY
Qty: 30 TABLET | Refills: 3 | Status: SHIPPED | OUTPATIENT
Start: 2020-07-02 | End: 2020-07-15 | Stop reason: SDUPTHER

## 2020-07-01 RX ORDER — ATORVASTATIN CALCIUM 80 MG/1
80 TABLET, FILM COATED ORAL NIGHTLY
Qty: 30 TABLET | Refills: 3 | Status: SHIPPED | OUTPATIENT
Start: 2020-07-01 | End: 2020-07-15 | Stop reason: SDUPTHER

## 2020-07-01 RX ORDER — NICOTINE 21 MG/24HR
1 PATCH, TRANSDERMAL 24 HOURS TRANSDERMAL DAILY
Qty: 30 PATCH | Refills: 3 | Status: SHIPPED | OUTPATIENT
Start: 2020-07-02 | End: 2020-12-18 | Stop reason: SDUPTHER

## 2020-07-01 RX ADMIN — CLOPIDOGREL 75 MG: 75 TABLET, FILM COATED ORAL at 07:23

## 2020-07-01 RX ADMIN — HYDROCODONE BITARTRATE AND ACETAMINOPHEN 1 TABLET: 10; 325 TABLET ORAL at 07:22

## 2020-07-01 RX ADMIN — ASPIRIN 81 MG 81 MG: 81 TABLET ORAL at 07:23

## 2020-07-01 RX ADMIN — ACETAMINOPHEN 1000 MG: 500 TABLET ORAL at 07:32

## 2020-07-01 ASSESSMENT — PAIN SCALES - GENERAL
PAINLEVEL_OUTOF10: 6
PAINLEVEL_OUTOF10: 9
PAINLEVEL_OUTOF10: 0
PAINLEVEL_OUTOF10: 0

## 2020-07-01 ASSESSMENT — PAIN DESCRIPTION - PROGRESSION
CLINICAL_PROGRESSION: NOT CHANGED
CLINICAL_PROGRESSION: GRADUALLY IMPROVING

## 2020-07-01 ASSESSMENT — PAIN DESCRIPTION - ORIENTATION: ORIENTATION: RIGHT;LEFT

## 2020-07-01 ASSESSMENT — PAIN DESCRIPTION - FREQUENCY: FREQUENCY: CONTINUOUS

## 2020-07-01 ASSESSMENT — PAIN DESCRIPTION - LOCATION: LOCATION: BACK

## 2020-07-01 ASSESSMENT — PAIN DESCRIPTION - PAIN TYPE: TYPE: CHRONIC PAIN

## 2020-07-01 ASSESSMENT — PAIN DESCRIPTION - ONSET: ONSET: ON-GOING

## 2020-07-01 ASSESSMENT — PAIN DESCRIPTION - DESCRIPTORS: DESCRIPTORS: ACHING;DISCOMFORT

## 2020-07-01 ASSESSMENT — PAIN - FUNCTIONAL ASSESSMENT: PAIN_FUNCTIONAL_ASSESSMENT: PREVENTS OR INTERFERES SOME ACTIVE ACTIVITIES AND ADLS

## 2020-07-01 NOTE — PROGRESS NOTES
Discharge instructions reviewed with pt. Pt stated understanding. Pt packed his own belongings. Pt given prescription for norco and outpatient PT/OT. Patient profile given to pt. RN walked pt out to private vehicle with his cane, ETB and belongings.

## 2020-07-01 NOTE — PROGRESS NOTES
Physical Therapy    Facility/Department: [facility]  Discharge Summary     NAME: Jerzy Carrillo  : 1965  MRN: [id number]    Date of Service: 2020  Evaluating Therapist: Patricia Singleton DPT    ROOM: [facility]  DIAGNOSIS: Acute CVA  PMH: To ED for L sided weakness/numbness. Found to have occlusion of left common carotid artery. tPA administered. CTA performed today showed an evolving late acute infarct of the right basal ganglia. Noted fall during hospitalization. PMH includes HTN, EtOH abuse, chronic back/knee pain     PRECAUTIONS: Left hemiparesis, falls, chair/bed alarm, bilateral knee OA braces     Pt lives alone in a 3rs floor apartment with elevator access. Pt ambulated with no AD PTA. Pt independent for ADL performance.      Initial Evaluation  20 Discharge  20 Short Term Goals Long Term Goals    Bed Mobility  Rolling: sba  Supine to sit: sba  Sit to supine: sba  Scooting: sba Mod I  sup  mod I    Transfers Sit to stand: cgA  Stand to sit: cgA  Stand pivot: cgA with LBQC  Sit to stand: sup  Stand to sit: sup  Stand pivot: sba/sup without AD Sup with AAD Mod I with AAD   Ambulation    50 feet with LBQC with cgA (L DF ace wrap) 250' without AD sba/sup (bilateral OA braces) 300 feet with AAD with sup >500 feet with AAD with mod I   Walking 10 feet on uneven surface 10 feet with LBQC with cgA 250' with spc sba/sup (bilateral OR braces)      Wheel Chair Mobility n/a n/a     Car Transfers Raysa sup sup Mod I   Stair negotiation: ascended and descended  8 steps with R rail with Raysa 12 steps without HR sba (NR patterning)  >12 steps with one rail with sup >12 steps with one rail with mod I   Curb Step:   ascended and descended 7.5 inch step with LBQC and Raysa 7.5 in curb step without AD sba 7.5 inch step with AAD and sup 7.5 inch step with AAD and mod I    Picking up object off the floor Picked up 1# with Raysa  Picked up 1# without AD Independent      BLE ROM [facility]     BLE Strength R LE: 4+/5    L LE: hip 3-/5, knee 3-/5, ankle 2-/5 R LE: 4+/5    L LE: hip 4-/5, knee 4/5, ankle 4-/5     Balance  Sitting: sup  Standing: cg/Raysa with LBQC Sitting: Independent   Standing: sba without AD vs spc      Date Family Teach Completed       Is additional Family Teaching Needed? Y or N       Hindering Progress L hemiparesis, balance      PT recommended ELOS 2-3 weeks      Team's Discharge Plan       Therapist at Team Meeting         Pt made steady and consistent progress during POC towards all LTG's. Pt denied further rehabilitation by insurance and currently requiring supervision with mobility due to higher level balance deficits. Issued spc for community mobility as well as to use initially at d/c with progression to no AD when cleared by OPPT. Rec OPPT for continued rehabilitation and safe discharge home. Pt to d/c home this date with intermittent assistance/supervision from sister/friends.      Brett Madison DPT  [id number]

## 2020-07-01 NOTE — PROGRESS NOTES
DISCHARGE SUMMARY    Group interaction skills/socialization:  Mary Dietrich displayed social interaction skills at the modified [place]. Leisure participation/awareness:  Mary Dietrich participated in 0 therapeutic recreation interventions identifying 0 benefits to leisure participation.     Other:     Outcomes: goals partially achieved      Electronically signed by Franky Chavez on 7/1/2020 at 1:12 PM

## 2020-07-01 NOTE — PLAN OF CARE
Problem: Falls - Risk of:  Goal: Absence of physical injury  Description: Absence of physical injury  6/30/2020 2201 by Isaias Godwin RN  Outcome: Ongoing  6/30/2020 0850 by Angelita Mary RN  Outcome: Ongoing     Problem: Pain:  Goal: Pain level will decrease  Description: Pain level will decrease  6/30/2020 2201 by Isaias Godwin RN  Outcome: Ongoing  6/30/2020 0850 by Angelita Mary RN  Outcome: Ongoing

## 2020-07-01 NOTE — PROGRESS NOTES
Nutrition Education    Type and Reason for Visit: Initial, Patient Education    Nutrition Assessment:  D/w patient Cardiac diet. Provided handout and verbal instruction. · Verbally reviewed information with Patient  · Written educational materials provided. · Contact name and number provided. · Refer to Patient Education activity for more details.     Electronically signed by Ta Link RD, LISA on 7/1/20 at 11:49 AM EDT    Contact Number: [phone]

## 2020-07-01 NOTE — PROGRESS NOTES
OCCUPATIONAL THERAPY DAILY NOTE/Discharge Summary    Date:2020  Patient Name: Lex Zarate  MRN: [id number]  : 1965  [facility]   Referring Practitioner: Rivera Otoole MD  Diagnosis: CVA- R basal ganglia infarction; s/p tPA  Additional Pertinent Hx: +ETOH, OA, hx chronic back pain     Precautions: Fall Risk, Impulsivity, L otis     Functional Assessment:   Date Status AE  Comments   Feeding 20 Mod I      Grooming 20 Setup      Bathing 20 SBA     UB Dressing 20 S     LB Dressing 20 S     Homemaking 20 S       Functional Transfers / Balance:   Date Status DME  Comments   Sit Balance 20 Supervision      Stand Balance 20 S     [x] Tub  [] Shower   Transfer 20 S Extended tub bench . Commode   Transfer 20 SBA     Functional   Mobility 20 SBA No device    Other:  Recliner, couch   20 SBA       Functional Exercises / Activity:      Sensory / Neuromuscular Re-Education:  E-stim provided to LUE wrist flexion X 10 mins on intensity of 20 with fair response; wrist extension on intensity of 21 X 10 mins with poor response noted. Cognitive Skills:   Status Comments   Problem   Solving fair     Memory fair     Sequencing fair     Safety fair       Visual Perception:    Education:  Pt educated on safety during ambulation in home environment. [] Family teach completed on:    Pain Level: 0/10    Additional Notes:       Patient has made good progress during treatment sessions toward set goals. Therapy emphasis to obtain goals: Strengthening, ROM, Gait Training, Safety Education & Training, Balance Training, Self-Care / ADL, Patient/Caregiver Education & Training, Functional Mobility Training, Neuromuscular Re-education, Equipment Evaluation, Education, & procurement, Home Management Training, Endurance Training, Cognitive Reorientation, Modalities (comment), Positioning    [] Continue with current OT Plan of care.   [x] Prepare for Discharge     DISCHARGE RECOMMENDATIONS  OCCUPATIONAL THERAPY DISCHARGE SUMMARY    Discontinue Occupational Therapy intervention. Pt has:   [] met all set goals. [x] made good progress toward set goals. [] has made slow progress toward goals and would benefit from rehab setting change.  [] had a medical decline and therefore was transferred off the Rehab unit. Long term goals  Time Frame for Long term goals : 10 days to address above problem areas  Long term goal 1: Pt demo Mod I to eat all meals  Long term goal 2: Pt demo Mod I grooming standing @ sink level  Long term goal 3: Pt demo Sup bathing @ shower level seated or standing   Long term goal 4: Pt demo independent UE dress & Mod I LE dress & demo G safety  Long term goal 5: Pt demo independent commode trf with appropriate DME to ensure pt safety  Long term goals 6: Pt demo Sup tub trf with approrpiate DME to ensure pt safety  Long term goal 7: Pt demo Mod I light homemaking activity  Long term goal 8: Pt demo improved movement LUE as evidenced by gains made on [employer] robotic arm- LUE- smoothness 0.259, reach error 0.105, path error 0.051 & initiation time 0.092 seconds, [place] size 0.014 & [place] indepemndence 0.391, hold deviation 0.099 & displacement 0.044.  All gains to improve pt abilty to complete ADLs & IADLs independently  Long term goal 9: Pt demo G endurance for a 30 minute functional activity    The Patient has received education on:  [x]Transfer Safety [x]Compensatory tech for ADLs [x]AE training [x]DME training [x]Energy Conservation [x]Home / Kitchen Safety  [x]Fall Prevention  []Other:    Family training was completed: no    Recommendations: OP OT      Recommended DME:    Post Discharge Care:   []Home Independently  []Home with 24hr Care / Supervision []Home with Partial Supervision []Home with Home Health OT []Home with Out Pt OT []Other: ___   Comments:         Time in Time out Tx Time Breakdown  Variance:   First Session  10:00 10:30 [x] Individual Tx- 30 Mins  [] Concurrent Tx -  [] Co-Tx -   [] Group Tx -   [] Time Missed -     Second Session   [] Individual Tx-    [] Concurrent Tx -   [] Co-Tx -   [] Group Tx -   [] Time Missed -     Third Session    [] Individual Tx-    [] Concurrent Tx -  [] Co-Tx -   [] Group Tx -   [] Time Missed -         Total Tx Time  30 Mins     Elida Rhoades CASTILLO/L [id number]    I have read & agree with the above status.     Mary Lujan OTR/L [id number]

## 2020-07-01 NOTE — PROGRESS NOTES
Speech Language Pathology  ACUTE REHABILITATION--DISCHARGE SUMMARY            ADMITTING DIAGNOSIS: Acute CVA (cerebrovascular accident) ([facility] [I63.9]     SUMMARY OF EVALUATION                 DYSPHAGIA DIAGNOSIS:  Minimal oropharyngeal dysphagia                            DIET RECOMMENDATIONS:  Regular consistency solids with  thin liquids                                                                                                                                           FIMS SCORES                            Swallowing                          Current Status             5--Supervision               Short Term Goal         6--Modified Independent                       Long Term Goal          6--Modified Independent                Receptive                          Current Status             7--Independent                         Short Term Goal         7--Independent                         Long Term Goal          7--Independent                Expressive                          Current Status             7--Independent                         Short Term Goal         7--Independent                         Long Term Goal          7--Independent                Social Interaction                          Current Status             7--Independent                         Short Term Goal         7--Independent                         Long Term Goal          7--Independent                                                               Problem Solving                          Current Status            6--Modified Independent              Short Term Goal         6--Modified Independent                       Long Term Goal          6--Modified Independent                          Memory                          Current Status            6--Modified Independent                Short Term Goal         6--Modified Independent                       Long Term Goal          6--Modified Independent Speech Pathologist (SLP) completed education with the patient/family regarding type of swallowing impairment. Reviewed current solid/liquid consistency diet recommendations and discussed compensatory strategies to ensure safe PO intake. Reviewed aspiration precautions. Encouraged patient and/or family to engage SLP in unstructured Q&A session relative to identified deficit areas; indicated understanding of all information provided via satisfactory verbal response. Good progress made toward goals. Recommend ongoing SP intervention to address dysphagia. Patient was provided a packet of exercises that he has been completing independently.

## 2020-07-01 NOTE — CARE COORDINATION
Per Balaji Patterson  Requested additional rehab days have been denied and patient is to be discharged today. Referral to out patient at [facility]( with robotic arm) sent this am.  They will call patient with schedule. DME:  Ordered cane and extended tub bench from [facility]      Family education not needed. The Plan for Transition of Care is related to the following treatment goals Home with out patient therapy    The Patient  was provided with a choice of provider and agrees   with the discharge plan. [x] Yes [] No    Freedom of choice list was provided with basic dialogue that supports the patient's individualized plan of care/goals, treatment preferences and shares the quality data associated with the providers.  [x] Yes [] No

## 2020-07-01 NOTE — DISCHARGE INSTR - DIET

## 2020-07-01 NOTE — PROGRESS NOTES
Physical Therapy    Facility/Department: [address] REHAB  Treatment Note    NAME: Terrence Bacon  : 1965  MRN: [id number]    Date of Service: 2020  Evaluating Therapist: Dre Ramirez DPT    ROOM: [address] [place] [address]  DIAGNOSIS: Acute CVA  PMH: To ED for L sided weakness/numbness. Found to have occlusion of left common carotid artery. tPA administered. CTA performed today showed an evolving late acute infarct of the right basal ganglia. Noted fall during hospitalization. PMH includes HTN, EtOH abuse, chronic back/knee pain     PRECAUTIONS: Left hemiparesis, falls, chair/bed alarm, bilateral knee OA braces     Pt lives alone in a 3rs floor apartment with elevator access. Pt ambulated with no AD PTA. Pt independent for ADL performance. Initial Evaluation  20 AM PM Short Term Goals Long Term Goals    Was pt agreeable to Eval/treatment? yes yes D/c     Does pt have pain?  Chronic knee/back pain  Chronic back/knee pain       Bed Mobility  Rolling: sba  Supine to sit: sba  Sit to supine: sba  Scooting: sba Mod I   sup  mod I    Transfers Sit to stand: cgA  Stand to sit: cgA  Stand pivot: cgA with LBQC  Sit to stand: sup  Stand to sit: sup  Stand pivot: sba/sup without AD  Sup with AAD Mod I with AAD   Ambulation    50 feet with LBQC with cgA (L DF ace wrap) 250' x2 without AD sba/sup (bilateral OA braces)      300 feet with AAD with sup >500 feet with AAD with mod I   Walking 10 feet on uneven surface 10 feet with LBQC with cgA 250' with spc sba/sup (bilateral OA braces)      Wheel Chair Mobility n/a n/a      Car Transfers Raysa sup  sup Mod I   Stair negotiation: ascended and descended  8 steps with R rail with Raysa 12 steps without HR sba (NR patterning)   >12 steps with one rail with sup >12 steps with one rail with mod I   Curb Step:   ascended and descended 7.5 inch step with LBQC and Raysa 7.5 in curb step without AD sba  7.5 inch step with AAD and sup 7.5 inch step with AAD and mod I    Picking up object off the floor Picked up 1# with Raysa  Picked up 1# without AD Independent       BLE ROM WFL WFL      BLE Strength R LE: 4+/5    L LE: hip 3-/5, knee 3-/5, ankle 2-/5 R LE: 4+/5    L LE: hip 4-/5, knee 4/5, ankle 4-/5      Balance  Sitting: sup  Standing: cg/Raysa with LBQC Sitting: Independent   Standing: sba without AD       Date Family Teach Completed        Is additional Family Teaching Needed? Y or N        Hindering Progress L hemiparesis, balance       PT recommended ELOS 2-3 weeks       Team's Discharge Plan        Therapist at Team Meeting          Therapeutic Exercise:   AM: 10x STS transfer at w/c, emphasis on technique and hand placement   Uneven surface ambulation: curb step/w/c ramps/side walks, mulch with spc with emphasis on attending to environmental changes and emphasize heel strike     PM:     Patient education  Pt educated on weight shifting with ambulation, hand placement with transfers, ambulation/stair patterning and technique     Patient response to education:   Pt verbalized understanding Pt demonstrated skill Pt requires further education in this area   yes Yes with vcA yes     Additional Comments: Reviewed all mobility as per above. Trial uneven surface ambulation with patient reporting feeling more comfortable initially outside using spc. No LOB noted with education on attending to environment and environmental changes to reduce risk of falls due to L LE toes catching during swing phase with patient verbalizing and demonstrating good awareness. Falls recovery completed Independently. In PM, pt d/c home this date. Chair/bed alarm: yes    AM  Time in: 0915  Time out: 1000    PM  Time in: dc  Time out: dc    Pt is making fair progress toward established Physical Therapy goals. Continue with physical therapy current plan of care.     Twila Wellington, CLAIRET  [id number]

## 2020-07-01 NOTE — PROGRESS NOTES
(37 °C), temperature source Temporal, resp. rate 16, height 5' 10\" (1.778 m), weight 145 lb (65.8 kg), SpO2 98 %. Vital signs are normal.    Output: Producing urine and producing stool. Lungs:  Normal effort and normal respiratory rate. Breath sounds clear to auscultation. Heart: Normal rate. Regular rhythm. S1 normal and S2 normal.    Abdomen: Abdomen is soft. Bowel sounds are normal.   There is no abdominal tenderness. Extremities: Normal range of motion. Neurological: Patient is alert. (2/5 left upper extremity  4/5 left lower). Assessment:      Date   Status   AE    Comments     Feeding   6/25/20   Minimal Assist              Grooming   6/30/20   Setup        Washed face, combed hair and applied deodorant. Pt requires assistance to secure hair tie. Bathing   6/30/20   SBA       UB and LB bathing completed seated and standing with SBA for stand balance. UB Dressing   6/30/20   S       Don button  Up shirt. Pt requires S to stand to don shirt onto LUE and pull shirt around shoulders. Pt stood to button up shirt. LB Dressing   6/30/20   S       Don/doff pants and socks. S to stand and pull pants over hips. Homemaking   6/30/20   S       In kitchen pt prepared cup of coffee and made toast. Pt retrieved all items and made toast with S. Pt educated on rocker knife, scoop plate and otis cutting board.           Functional Transfers / Balance:      Date Status DME  Comments   Sit Balance 6/28/20   Supervision        Stand Balance 6/30/20   S       [x]? Tub  []? Shower   Transfer 6/30/20   S Extended tub bench . Commode   Transfer 6/29/20   SBA         Functional   Mobility 6/29/20   SBA No device       Other:  Recliner, couch    6/29/20 SBA            Assessment:    Condition: In stable condition. Improving.   (CVA). Plan:   Encourage ambulation.   (Showing good recovery overall  He is starting to get some movement back in the left arm  We are aggressively working with e-stim and in motion arm on the left  He is complaining of some issues with left arm and back pain  I would like to have him on a lower dose of Norco but I do not think I am going to be able to do that).        Manny Lane MD  7/1/2020

## 2020-07-02 NOTE — DISCHARGE SUMMARY
[address]                  [address] [facility]  [facility]                               DISCHARGE SUMMARY    PATIENT NAME: Brinda Segura                      :        1965  MED REC NO:   [id number]                            ROOM:       0769  ACCOUNT NO:   [de-identified]                           ADMIT DATE: 2020  PROVIDER:     Martínez Kamara MD                  DISCHARGE DATE:  2020    REHAB DISCHARGE SUMMARY    INTRODUCTION:  The patient was admitted to [facility] with a CVA  and left hemiparesis. He was transferred to acute rehab on 2020. HOSPITAL COURSE:  On admission, the patient was felt to be a good rehab  candidate. The leg improved quicker than the arm. He showed a complete  flaccid paralysis on the arm initially and progressed to 2- in movement,  but it was still nonfunctional.  He progressed very well with the leg  strength and his functional mobility and was able to ambulate with  supervision. He was ready for discharge on 2020. Discharge  condition is good. Discharge is to home. LABORATORY DATA:  CBC and metabolic profile were unremarkable other than  a white count of 15,000 which was actually improved from admission. MEDICATIONS:  1. Aspirin 81 mg daily. 2.  Lipitor 80 mg nightly. 3.  Plavix 75 mg daily. 4.  Flexeril 5 mg nightly. 5.  Nicoderm patch. 6.  Norco 10/325 as needed for pain. DIAGNOSES:  1. CVA with left hemiparesis. 2.  Chronic back pain. 3.  Nicotine addiction. 4.  COPD.  5.  Status post tPA.         Lieutenant Andree MD    D: 2020 9:07:56       T: 2020 9:14:16     [email or web address]  Job#: [id number]     Doc#: [id number]    CC:

## 2020-07-06 ENCOUNTER — TELEPHONE (OUTPATIENT)
Dept: VASCULAR SURGERY | Age: 55
End: 2020-07-06

## 2020-07-06 NOTE — TELEPHONE ENCOUNTER
----- Message from Xiao Zepeda MD sent at 7/6/2020  9:23 AM EDT -----  Please give him an appointment see me in 1 year for monitoring the carotid artery disease and stroke

## 2020-07-13 ENCOUNTER — HOSPITAL ENCOUNTER (OUTPATIENT)
Dept: OCCUPATIONAL THERAPY | Age: 55
Setting detail: THERAPIES SERIES
Discharge: HOME | End: 2020-07-13
Payer: MEDICAID

## 2020-07-13 ENCOUNTER — HOSPITAL ENCOUNTER (OUTPATIENT)
Dept: PHYSICAL THERAPY | Age: 55
Setting detail: THERAPIES SERIES
Discharge: HOME | End: 2020-07-13
Payer: MEDICAID

## 2020-07-13 PROCEDURE — 97110 THERAPEUTIC EXERCISES: CPT | Performed by: OCCUPATIONAL THERAPIST

## 2020-07-13 PROCEDURE — 97161 PT EVAL LOW COMPLEX 20 MIN: CPT | Performed by: PHYSICAL THERAPIST

## 2020-07-13 PROCEDURE — 97165 OT EVAL LOW COMPLEX 30 MIN: CPT | Performed by: OCCUPATIONAL THERAPIST

## 2020-07-13 PROCEDURE — 97140 MANUAL THERAPY 1/> REGIONS: CPT | Performed by: OCCUPATIONAL THERAPIST

## 2020-07-13 NOTE — PROGRESS NOTES
[facility]                Phone: [phone]   Fax: [phone]    Physical Therapy Daily Treatment Note  Date:  2020    Patient Name:  Sudha Teresa    :  1965  MRN: [id number]    Evaluating therapist:  Consuello Harada, MPT                      (20)  Restrictions/Precautions:    Diagnosis:  s/p CVA   Treatment Diagnosis:    Insurance/Certification information:  [employer]  Referring Physician:  Garrick POZO  Plan of care signed (Y/N):    Visit# / total visits:  1/10  Pain level: /10   Time In:  Time Out:    Subjective:      Exercises:  Exercise/Equipment Resistance/Repetitions Other comments   StepOne              seated hip abd                       flex            sit/stand            NK flex/ext            toe raises     step ups             side             tandem amb     side/side amb      foam marching                             Other Therapeutic Activities:      Home Exercise Program:      Manual Treatments:      Modalities:      Timed Code Treatment Minutes: Total Treatment Minutes:      Treatment/Activity Tolerance:  [] Patient tolerated treatment well [] Patient limited by fatique  [] Patient limited by pain  [] Patient limited by other medical complications  [] Other:     Prognosis: [] Good [] Fair  [] Poor    Patient Requires Follow-up: [] Yes  [] No    Plan:   [] Continue per plan of care [] Alter current plan (see comments)  [] Plan of care initiated [] Hold pending MD visit [] Discharge  Plan for Next Session:      See Weekly Progress Note: []  Yes  []  No  Next due:        Electronically signed by:   Amaury Le
Tolerated treatment well       Plan   Plan  Times per week: pt to be seen 2x/week/5 weeks  Current Treatment Recommendations: ROM, Strengthening, Endurance Training, Balance Training, Gait Training, Home Exercise Program    OutComes Score  Balance Score: 16 (07/13/20 1136)  Gait Score: 12 (07/13/20 1136)        Tinetti Total Score: 28 (07/13/20 1136)    Goals  Time Frame for goals: 5 weeks  goal 1: increase strength across L LE to grossly 4, 4+/5 for all ranges improving static/dynamic balance to GOOD/GOOD+  goal 2: improve endurance for all prolonged activities to GOOD/GOOD+  goal 3: assure I with HEP for home management of condition    Patient Goals   Patient goals :  To strengthen L LE and improve overall balance and stability with walking       Almita Call, PT
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
## 2020-07-15 ENCOUNTER — HOSPITAL ENCOUNTER (OUTPATIENT)
Dept: PHYSICAL THERAPY | Age: 55
Setting detail: THERAPIES SERIES
Discharge: HOME | End: 2020-07-15
Payer: MEDICAID

## 2020-07-15 ENCOUNTER — HOSPITAL ENCOUNTER (OUTPATIENT)
Dept: OCCUPATIONAL THERAPY | Age: 55
Setting detail: THERAPIES SERIES
Discharge: HOME | End: 2020-07-15
Payer: MEDICAID

## 2020-07-15 ENCOUNTER — OFFICE VISIT (OUTPATIENT)
Dept: FAMILY MEDICINE CLINIC | Age: 55
End: 2020-07-15
Payer: MEDICAID

## 2020-07-15 VITALS
TEMPERATURE: 98.2 F | DIASTOLIC BLOOD PRESSURE: 78 MMHG | HEART RATE: 91 BPM | OXYGEN SATURATION: 96 % | RESPIRATION RATE: 20 BRPM | HEIGHT: 70 IN | SYSTOLIC BLOOD PRESSURE: 128 MMHG | BODY MASS INDEX: 19.64 KG/M2 | WEIGHT: 137.2 LBS

## 2020-07-15 PROCEDURE — 97112 NEUROMUSCULAR REEDUCATION: CPT | Performed by: PHYSICAL THERAPIST

## 2020-07-15 PROCEDURE — 1111F DSCHRG MED/CURRENT MED MERGE: CPT | Performed by: FAMILY MEDICINE

## 2020-07-15 PROCEDURE — 97110 THERAPEUTIC EXERCISES: CPT | Performed by: OCCUPATIONAL THERAPIST

## 2020-07-15 PROCEDURE — 97112 NEUROMUSCULAR REEDUCATION: CPT | Performed by: OCCUPATIONAL THERAPIST

## 2020-07-15 PROCEDURE — 97110 THERAPEUTIC EXERCISES: CPT | Performed by: PHYSICAL THERAPIST

## 2020-07-15 PROCEDURE — 99214 OFFICE O/P EST MOD 30 MIN: CPT | Performed by: FAMILY MEDICINE

## 2020-07-15 PROCEDURE — 97530 THERAPEUTIC ACTIVITIES: CPT | Performed by: PHYSICAL THERAPIST

## 2020-07-15 RX ORDER — CLOPIDOGREL BISULFATE 75 MG/1
75 TABLET ORAL DAILY
Qty: 90 TABLET | Refills: 1 | Status: SHIPPED
Start: 2020-07-15 | End: 2021-04-19

## 2020-07-15 RX ORDER — ATORVASTATIN CALCIUM 80 MG/1
80 TABLET, FILM COATED ORAL NIGHTLY
Qty: 90 TABLET | Refills: 1 | Status: SHIPPED
Start: 2020-07-15 | End: 2020-08-26

## 2020-07-15 RX ORDER — CYCLOBENZAPRINE HCL 5 MG
5 TABLET ORAL EVERY EVENING
COMMUNITY
Start: 2020-07-01 | End: 2020-07-15

## 2020-07-15 RX ORDER — FOLIC ACID 1 MG/1
1 TABLET ORAL DAILY
Qty: 90 TABLET | Refills: 1 | Status: SHIPPED
Start: 2020-07-15 | End: 2021-01-15

## 2020-07-15 RX ORDER — ASPIRIN 81 MG/1
81 TABLET, CHEWABLE ORAL DAILY
Qty: 90 TABLET | Refills: 1 | Status: SHIPPED
Start: 2020-07-15 | End: 2021-01-18

## 2020-07-15 ASSESSMENT — PATIENT HEALTH QUESTIONNAIRE - PHQ9
1. LITTLE INTEREST OR PLEASURE IN DOING THINGS: 0
SUM OF ALL RESPONSES TO PHQ QUESTIONS 1-9: 0
SUM OF ALL RESPONSES TO PHQ QUESTIONS 1-9: 0
2. FEELING DOWN, DEPRESSED OR HOPELESS: 0
SUM OF ALL RESPONSES TO PHQ9 QUESTIONS 1 & 2: 0

## 2020-07-15 NOTE — PROGRESS NOTES
[facility]                Phone: [phone]   Fax: [phone]    Physical Therapy Daily Treatment Note  Date:  7/15/2020    Patient Name:  Jojo Doe    :  1965  MRN: [id number]    Evaluating therapist:  GIANNI Rose                      (20)  Restrictions/Precautions:    Diagnosis:  s/p CVA   Treatment Diagnosis:    Insurance/Certification information:  [employer]  Referring Physician:  Abdiel POZO  Plan of care signed (Y/N):    Visit# / total visits:  2/10  Pain level: /10   Time In:  1107  Time Out:  1202    Subjective:      Exercises:  Exercise/Equipment Resistance/Repetitions Other comments   StepOne   10 min            seated hip abd [id number]                      flex [id number]           sit/stand 2x10           NK flex/ext            toe raises 2x15    step ups 2x10x6\"            side             tandem amb 2 min     side/side amb  2 min     foam marching  3x30s                                                          Other Therapeutic Activities:      Home Exercise Program:  provided 7/15/20      Manual Treatments:      Modalities:      Timed Code Treatment Minutes: Total Treatment Minutes:      Treatment/Activity Tolerance:  [] Patient tolerated treatment well [] Patient limited by fatique  [] Patient limited by pain  [] Patient limited by other medical complications  [] Other:     Prognosis: [] Good [] Fair  [] Poor    Patient Requires Follow-up: [] Yes  [] No    Plan:   [] Continue per plan of care [] Alter current plan (see comments)  [] Plan of care initiated [] Hold pending MD visit [] Discharge  Plan for Next Session:      See Weekly Progress Note: []  Yes  []  No  Next due:        Electronically signed by:   Roberto Ross

## 2020-07-15 NOTE — PROGRESS NOTES
OCCUPATIONAL THERAPY PROGRESS NOTE    Date:  7/15/2020  Initial Evaluation Date: 2020                Evaluating Therapist: Rickie Mcnair     Patient Name:  Terrence Bacon                      :  1965     Restrictions/Precautions:  Sling for flaccid L UE, low fall risk  Diagnosis:  I63.9 (ICD-10-CM) - Acute CVA (cerebrovascular accident) ([facility] [address] [facility] (L-sided hemiplegia)                  Insurance/Certification information:  [employer]  Referring Practitioner:  Dr. Ant Gutierrez MD  Date of Surgery/Injury: 2020  Plan of care signed (Y/N):  N  Visit# / total visits:     Pain Level: 6 on scale of 1-10 in distal L UE and back, aching, needle-like, sharp, uncomfortable and variable intensity    Subjective: Pt reports \"I am extremely tired today, I almost called to cancel but thought better not. I know I need to not miss therapy. I haven't looked into the splint yet, I just haven't had time to connect with my sister. \" Pt called later in afternoon reporting increased lower back pain and bilateral knee pain following OT/PT sessions. PT wasn't available to speak with to discuss ^ in pain. He reports it was intolerable but isn't allowed to take ibuprofen. Pt asking if he should get a hold of [facility]. Therapist suggested calling physicians office and explaining current onset of conditions. Pt to keep therapist updated on pain prior to [facility] treatment. Objective:  Updated POC to be completed by 10th session. INTERVENTION: COMPLETED: REPS/TIME: SPECIFICS/COMMENTS:   Modality:      Fluidotherapy       MHP      AROM/AAROM:      AAROM R UE All Planes Lying Supine x 25 reps ea Complete scapular retraction/protraction, shoulder flex/ext, horizontal abduction/adduction, elbow flex/ext, wrist flex/ext with assist of unaffected UE. Added to HEP. PROM/Stretching:       Torso Twist Lying Supine x 20 reps Rolling to R side and reaching to wall with bilateral arms to stretch posterior shoulder/upper back/mid back. Added to HEP. Plyo [employer] for Back Stretches x 15 reps ea Forward and side to side within pt's tolerance. Pt reporting relief in back following. Neuro Re-Ed:      Weight Bearing for Proprioceptive Input x 10 min Lying on L side to WB into shoulder, then onto elbow, then seated upright with L hand flat on mat and elbow blocked with max A. Therapeutic Activity:                  Strengthening:                  Other:                    Assessment/Comments: Pt is making Fair progress toward stated plan of care. Pt extremely fatigued prior to session. Complaining of mild tightness throughout back. Completed all self ROM activities for pt to begin working on at home. He demonstrated good understanding. Pt required extended time between exercises and frequent breaks to conserve energy. He is a heavy smoker. Pt reporting relief in back and arm pain by end of session.    -Rehab Potential: Good  -Requires OT Follow Up: Yes              Time In: 10:05 am            Time Out: 11:00 am     Treatment Charges: Mins Units   Modalities     Ther Exercise 45 3   Manual Therapy     Thera Activities     ADL/Home Mgt      Neuro Re-education 10 1   Gait Training     Group Therapy     Non-Billable Service Time     Other     Total Time/Units 55 4       -Response to Treatment: Pt tolerated treatment session well today. Goal Progress: Goals for pt can be see on initial eval occurring on 7/13/2020. Pt. Education:  [x] Yes  [] No  [] Reviewed Prior HEP/Ed  Method of Education: [x] Verbal  [x] Demo  [] Written  Comprehension of Education:  [x] Verbalizes understanding. [x] Demonstrates understanding. [x] Needs review. [] Demonstrates/verbalizes HEP/Ed previously given. HEP: Provided with SROM/AAROM handouts to complete while lying supine and seated upright. Plan:   [x]  Continues Plan of care: Treatment covered based on POC and graduated to patient's progress.  Pt education continues at each visit to obtain maximum benefits from skilled OT intervention.   []  Alter Plan of care:   []  Discharge:    Adore Pérez [address], OTR/L #[id number]

## 2020-07-15 NOTE — PROGRESS NOTES
No   Post-Discharge Transitional Care Management Services or Hospital Follow Up      Porsha Veliz   YOB: 1965    Date of Office Visit:  7/15/2020  Date of Hospital Admission: 6/24/20  Date of Hospital Discharge: 7/1/20  Risk of hospital readmission (high >=14%.  Medium >=10%) :Readmission Risk Score: 9      Care management risk score Rising risk (score 2-5) and Complex Care (Scores >=6): 5     Non face to face  following discharge, date last encounter closed (first attempt may have been earlier): *No documented post hospital discharge outreach found in the last 14 days    Call initiated 2 business days of discharge: *No response recorded in the last 14 days    Patient Active Problem List   Diagnosis    Primary osteoarthritis involving multiple joints    Tobacco abuse    Chronic bilateral low back pain with sciatica    Chronic pain of both knees    Closed nondisplaced fracture of phalanx of left thumb with routine healing    Contusion of rib on left side    Centrilobular emphysema ([address]    Lumbar spondylosis    Stroke aborted by administration of thrombolytic agent ([address]    Hyperlipidemia    H/O: CVA (cerebrovascular accident)    Internal carotid artery occlusion, left    Paralysis of left upper extremity ([address]    Left leg weakness    Stroke-like symptom    Infarction of right basal ganglia (HCC)    Acute CVA (cerebrovascular accident) ([address]       No Known Allergies    Medications listed as ordered at the time of discharge from hospital   PUNE[facility] [address] Medication Instructions KEMAR:    Printed on:07/15/20 5142   Medication Information                      aspirin 81 MG chewable tablet  Take 1 tablet by mouth daily             atorvastatin (LIPITOR) 80 MG tablet  Take 1 tablet by mouth nightly             clopidogrel (PLAVIX) 75 MG tablet  Take 1 tablet by mouth daily             nicotine (NICODERM CQ) 14 MG/24HR  Place 1 patch onto the skin daily                   Medications oropharynx clear and moist with normal mucous membranes and hearing grossly normal bilaterally  Neck: neck supple and non tender without mass, no thyromegaly or thyroid nodules, no cervical lymphadenopathy   Pulmonary/Chest: clear to auscultation bilaterally- no wheezes, rales or rhonchi, normal air movement, no respiratory distress and no chest wall tenderness  Cardiovascular: normal rate, regular rhythm, normal S1 and S2, no murmurs, no gallops, intact distal pulses and no carotid bruits  Abdomen: soft, non-tender, non-distended, normal bowel sounds, no masses or organomegaly  Extremities: no cyanosis and no clubbing  Musculoskeletal: severe lower back pain  Neurologic: post CVA with left arm weakness, non use    Assessment/Plan:  1. Acute CVA (cerebrovascular accident) ([facility]    ---VASCULAR PANEL  A) ASA, PLAVIX, aggrenox  B) coumadin, pletal, tzd, STATIN  C) ace, hctz, FOLIC, ccb  D) cannikinumab, fish oils     ---CARDIAC---ASA,  ace, beta, STATIN, hctz, ( ccb )    - clopidogrel (PLAVIX) 75 MG tablet; Take 1 tablet by mouth daily  Dispense: 90 tablet; Refill: 1  - aspirin 81 MG chewable tablet; Take 1 tablet by mouth daily  Dispense: 90 tablet; Refill: 1  - Basic Metabolic Panel; Future  - CBC Auto Differential; Future    2. Infarction of right basal ganglia (HCC)      - Basic Metabolic Panel; Future  - CBC Auto Differential; Future    3. Centrilobular emphysema ([facility]    - Basic Metabolic Panel; Future  - CBC Auto Differential; Future    4. IFG (impaired fasting glucose)    - Basic Metabolic Panel; Future  - CBC Auto Differential; Future  - Hemoglobin A1C; Future    5. Dyslipidemia    - atorvastatin (LIPITOR) 80 MG tablet; Take 1 tablet by mouth nightly  Dispense: 90 tablet; Refill: 1  - Basic Metabolic Panel; Future  - Lipid Panel; Future  - CBC Auto Differential; Future    6. Fatigue, unspecified type    - TSH without Reflex; Future  - Uric Acid; Future  - Basic Metabolic Panel;  Future  - CBC Auto Differential; Future    7. Screening PSA (prostate specific antigen)    - Psa screening; Future    8. Screen for colon cancer    - Cologuard (For External Results Only); Future  - Basic Metabolic Panel;  Future  - CBC Auto Differential; Future        Medical Decision Making: high complexity

## 2020-07-16 NOTE — PROGRESS NOTES
Pt contacted PT reporting increased LB/B knee pain since first visit for rehab of his CVA; tells PT that the activities cause too much aching and he is going to forego further PT sessions and focus on his OT for UE return; PT informed pt that he is doing well enough that this should not be an issue but can always revisit therapy if status changes.

## 2020-07-20 ENCOUNTER — HOSPITAL ENCOUNTER (OUTPATIENT)
Dept: OCCUPATIONAL THERAPY | Age: 55
Setting detail: THERAPIES SERIES
Discharge: HOME | End: 2020-07-20
Payer: MEDICAID

## 2020-07-20 ENCOUNTER — HOSPITAL ENCOUNTER (OUTPATIENT)
Dept: PHYSICAL THERAPY | Age: 55
Setting detail: THERAPIES SERIES
Discharge: HOME | End: 2020-07-20
Payer: MEDICAID

## 2020-07-20 NOTE — PROGRESS NOTES
[facility]                Phone: [phone]  Fax: [phone]    Physical Therapy  Cancellation/No-show Note  Patient Name:  Marcial Salinas  :  1965   Date:  2020    For today's appointment patient:  [x]  Cancelled  []  Rescheduled appointment  []  No-show     Reason given by patient:  []  Patient ill  []  Conflicting appointment  []  No transportation    []  Conflict with work  []  No reason given  [x]  Other:  pt decided to forego further treatment as it was flaring up his knees/LB too much. Comments:      Electronically signed by:   Jessa Villareal

## 2020-07-20 NOTE — PROGRESS NOTES
Occupational Therapy   Cancellation/No-show Note     Patient Name:  Jerzy Carrillo  : 1965  Date:  2020  MRN: [id number]    For today's appointment patient:       Total missed visits including today: 1         Total number of no shows: 0    [x]  Cancelled   []  Rescheduled appointment   []  No-show     Reason given by patient:   []  Patient ill   []  Conflicting appointment   []  No transportation   []  Conflict with work   []  No reason given   [x]  Other: Lower back and bilateral knee pain     Comments: Cancelled d/t pain, plans to attend OT on  @ 10:00 am if improves. Therapist working on getting pt scheduled with [employer].     Electronically signed by: Ashleigh Conn [facility] #[id number]

## 2020-07-22 ENCOUNTER — APPOINTMENT (OUTPATIENT)
Dept: PHYSICAL THERAPY | Age: 55
End: 2020-07-22
Payer: MEDICAID

## 2020-07-27 ENCOUNTER — APPOINTMENT (OUTPATIENT)
Dept: OCCUPATIONAL THERAPY | Age: 55
End: 2020-07-27
Payer: MEDICAID

## 2020-07-28 ENCOUNTER — HOSPITAL ENCOUNTER (OUTPATIENT)
Dept: OCCUPATIONAL THERAPY | Age: 55
Setting detail: THERAPIES SERIES
Discharge: HOME | End: 2020-07-28
Payer: MEDICAID

## 2020-07-28 PROCEDURE — 97112 NEUROMUSCULAR REEDUCATION: CPT

## 2020-07-28 NOTE — PROGRESS NOTES
OCCUPATIONAL THERAPY PROGRESS NOTE    Date:  2020  Initial Evaluation Date: 2020                Evaluating Therapist: Shelly Zaidi     Patient Name:  Eliz Bachelor                      :  1965     Restrictions/Precautions:  Sling for flaccid L UE, low fall risk  Diagnosis:  I63.9 (ICD-10-CM) - Acute CVA (cerebrovascular accident) ([facility] (L-sided hemiplegia)                  Insurance/Certification information:  Storm Given  Referring Practitioner:  Dr. Ana Paula Salgado MD  Date of Surgery/Injury: 2020  Plan of care signed (Y/N):  N  Visit# / total visits: 3 / 18    Pain Level: 6 on scale of 1-10 in distal L UE and back, aching, needle-like, sharp, uncomfortable and variable intensity     Subjective: Pt stated \"I want to do things with my left arm like tie my shoes. \"     OBJECTIVE: [employer] ex's         RI   DFT  RP  MJ  DFSL  Protocol Comments   #  2 5 71 116 5 Random     # 2/160    1 5 112 127 7 \"  Focus on  RI and DFT   # 3/240    1 5 93 126 6 \"  Focus on  RP and MJ   # 4/320    4 4 96 129 7 \"  Focus on DFT & MJ   #400     0 3 27 120 5 Error Augmentation 2     #     4 4 89 127 6 \" Focus on  MJ & DFSL   #             #/640                          Stabilization                 Resistance 50             80 reps   Maze ax                 Pong ax                                   Pt re-assessed on this date as he has not been seen since 2020 due to pandemic & outpatient services have been closed. Pt tolerated reassessment, pre & post testing random protocol, error augmenation 2 & resistance 50 for 80 for a total of 626 movements during the treatment session. Pt demonstrated improvements from pre to post testing in motion smoothness from 0.259 to 0.424.  Pt path error 0.051 to 0.012, reach error improved from 0.105 to 0.025, initiation time improved from 0.092 seconds to 0.018 seconds, [place] size independence improved from 0.391 to 0.518, hold deviation improved from 0.099 to 0.091& displacement improved from 0.044 to 0.101. During treatment on this date pt made the following gains: smoothness improved from 0.433 to 0.466 & reach error improved from 0.021 to 0.010. Pt did require 2 rest breaks during treatment due to back pain. All [employer] ex's are performed to increase function of LUE to  assist with ADL's and IADL's at home. Pt does appreciate the immediate feedback that is provided using [employer] arm. Pt is highly motivated & engages in ADLs & IADLs in the home.      Assessment/Comments: Pt is making Good progress toward stated plan of care. Pt remains highly motivated     -Rehab Potential: Good     -Requires OT Follow Up: Yes  Time In: 1100       Time Out: 1200     Treatment Charges: Mins Units   Modalities       Ther Exercise       Manual Therapy       Thera Activities       ADL/Home Mgt        Neuro Re-education 60 4   Group Therapy       Non-Billable Service Time       Other       Total Time/Units 60 Min  4      -Response to Treatment: Good  Plan:   x  Continues Plan of care: Pt education continues at each visit to obtain maximum benefits from skilled OT intervention.   ?  Alter Plan of care:   ?  Discharge  Zafar Donis OTR/L [id number]

## 2020-07-29 ENCOUNTER — HOSPITAL ENCOUNTER (OUTPATIENT)
Dept: OCCUPATIONAL THERAPY | Age: 55
Setting detail: THERAPIES SERIES
Discharge: HOME | End: 2020-07-29
Payer: MEDICAID

## 2020-08-03 ENCOUNTER — HOSPITAL ENCOUNTER (OUTPATIENT)
Dept: OCCUPATIONAL THERAPY | Age: 55
Setting detail: THERAPIES SERIES
Discharge: HOME | End: 2020-08-03
Payer: MEDICAID

## 2020-08-05 ENCOUNTER — HOSPITAL ENCOUNTER (OUTPATIENT)
Dept: OCCUPATIONAL THERAPY | Age: 55
Setting detail: THERAPIES SERIES
Discharge: HOME | End: 2020-08-05
Payer: MEDICAID

## 2020-08-05 PROCEDURE — 97112 NEUROMUSCULAR REEDUCATION: CPT | Performed by: OCCUPATIONAL THERAPY ASSISTANT

## 2020-08-05 NOTE — PROGRESS NOTES
IADLs in the home.      Assessment/Comments: Pt is making Good progress toward stated plan of care. Pt remains highly motivated     -Rehab Potential: Good     -Requires OT Follow Up: Yes  Time In: 1400       Time Out: 1500     Treatment Charges: Mins Units   Modalities       Ther Exercise       Manual Therapy       Thera Activities       ADL/Home Mgt        Neuro Re-education 60 4   Group Therapy       Non-Billable Service Time       Other       Total Time/Units 60 Min  4      -Response to Treatment: Good  Plan:   x  Continues Plan of care: Pt education continues at each visit to obtain maximum benefits from skilled OT intervention. ?  Alter Plan of care:   ?Shy [place] Rising CASTILLO/L [id number]       I have read & agree with the above status.     Vivek Webb OTR/L [id number]

## 2020-08-12 ENCOUNTER — HOSPITAL ENCOUNTER (OUTPATIENT)
Dept: OCCUPATIONAL THERAPY | Age: 55
Setting detail: THERAPIES SERIES
Discharge: HOME | End: 2020-08-12
Payer: MEDICAID

## 2020-08-12 PROCEDURE — 97112 NEUROMUSCULAR REEDUCATION: CPT

## 2020-08-12 NOTE — PROGRESS NOTES
OCCUPATIONAL THERAPY PROGRESS NOTE    Date:  2020    Initial Evaluation Date: 2020                Evaluating Therapist: Ashleigh Manning     Patient Name:  Jerzy Carrillo                      :  1965     Restrictions/Precautions:  Sling for flaccid L UE, low fall risk  Diagnosis:  I63.9 (ICD-10-CM) - Acute CVA (cerebrovascular accident) ([address] (L-sided hemiplegia)                  Insurance/Certification information:  Sachin Cazares  Referring Practitioner:  Dr. Karin Torrez MD  Date of Surgery/Injury: 2020  Plan of care signed (Y/N):  N  Visit# / total visits:     Pain Level: 6/10 shoulder, back and knee pain varying in intensity. Pt required standing rest break in the middle of session to stretch knees/back.     Subjective: Pt stated \"I try to use it sometimes but it just flops. \"     OBJECTIVE: [employer] ex's         RI   DFT  RP  MJ  DFSL  Protocol Comments   # 1/80 0 5 12 116 5 AA Random     # 2/160    0 4 22 123 6 AA Random  Focus on  RP and MJ   # 3/240    0 5 13 129 6 AA Random  Focus on  MJ   # 4/320    0 5 33 114 5  AA Random  Focus on  RP & MJ   #5/400     0 5 15 117 5 AA Random Focus on MJ   #6/480    0 5 15 121 6 AA Random Focus on MJ   #7/560    0 6 9 118 6 AA Random Focus on  MJ & DFSL   #8/640 0   5 25 118 6 AA Random Focus on  RP and MJ   #9/720  0 4 24 117 5 AA Random Focus on MJ & RP                   Stabilization 100             160 Seconds   4 Way Pong             Level 15                                         Pt . Performed a total of 768 movements including pre and post testing plus 160 secs of stabilization 100 and 4 way pong activity. Pt. Demonstrated improvements from pre to post testing in motion smoothness from 0.517 to 0.549, mean velocity from 0.105 to 0.125, max velocity from 0.206 to 0.232, path error from 0.011 to 0.009 and initation time from 0.074s to 0.021s.   All [employer] ex's are performed to increase function of LUE to  assist with ADL's and IADL's at home.  Pt does appreciate the immediate feedback that is provided using [employer] arm. Pt is highly motivated & continues to engage in ADLs & IADLs in the home.      Assessment/Comments: Pt is making Good progress toward stated plan of care. Pt remains highly motivated     -Rehab Potential: Good     -Requires OT Follow Up: Yes  Time In: 1345       Time Out: 1445     Treatment Charges: Mins Units   Modalities       Ther Exercise       Manual Therapy       Thera Activities       ADL/Home Mgt        Neuro Re-education 60 4   Group Therapy       Non-Billable Service Time       Other       Total Time/Units 60 Min  4      -Response to Treatment: Good  Plan:   x  Continues Plan of care: Pt education continues at each visit to obtain maximum benefits from skilled OT intervention. ?  Alter Plan of care:   ?[address] [place] [address] CASTILLO/L [id number]      I have read & agree with the above status.     Tay Ding OTR/L [id number]

## 2020-08-13 ENCOUNTER — NURSE ONLY (OUTPATIENT)
Dept: FAMILY MEDICINE CLINIC | Age: 55
End: 2020-08-13

## 2020-08-13 ENCOUNTER — HOSPITAL ENCOUNTER (OUTPATIENT)
Age: 55
Discharge: HOME | End: 2020-08-15
Payer: MEDICAID

## 2020-08-13 LAB
ANION GAP SERPL CALCULATED.3IONS-SCNC: 13 MMOL/L (ref 7–16)
BASOPHILS ABSOLUTE: 0.05 E9/L (ref 0–0.2)
BASOPHILS RELATIVE PERCENT: 0.3 % (ref 0–2)
BUN BLDV-MCNC: 7 MG/DL (ref 6–20)
CALCIUM SERPL-MCNC: 10 MG/DL (ref 8.6–10.2)
CHLORIDE BLD-SCNC: 101 MMOL/L (ref 98–107)
CHOLESTEROL, TOTAL: 244 MG/DL (ref 0–199)
CO2: 25 MMOL/L (ref 22–29)
CREAT SERPL-MCNC: 0.8 MG/DL (ref 0.7–1.2)
EOSINOPHILS ABSOLUTE: 0.07 E9/L (ref 0.05–0.5)
EOSINOPHILS RELATIVE PERCENT: 0.5 % (ref 0–6)
GFR AFRICAN AMERICAN: >60
GFR NON-AFRICAN AMERICAN: >60 ML/MIN/1.73
GLUCOSE BLD-MCNC: 96 MG/DL (ref 74–99)
HBA1C MFR BLD: 5.3 % (ref 4–5.6)
HCT VFR BLD CALC: 50.2 % (ref 37–54)
HDLC SERPL-MCNC: 37 MG/DL
HEMOGLOBIN: 16.1 G/DL (ref 12.5–16.5)
IMMATURE GRANULOCYTES #: 0.05 E9/L
IMMATURE GRANULOCYTES %: 0.3 % (ref 0–5)
LDL CHOLESTEROL CALCULATED: 171 MG/DL (ref 0–99)
LYMPHOCYTES ABSOLUTE: 1.8 E9/L (ref 1.5–4)
LYMPHOCYTES RELATIVE PERCENT: 12.3 % (ref 20–42)
MCH RBC QN AUTO: 30.7 PG (ref 26–35)
MCHC RBC AUTO-ENTMCNC: 32.1 % (ref 32–34.5)
MCV RBC AUTO: 95.8 FL (ref 80–99.9)
MONOCYTES ABSOLUTE: 1.02 E9/L (ref 0.1–0.95)
MONOCYTES RELATIVE PERCENT: 7 % (ref 2–12)
NEUTROPHILS ABSOLUTE: 11.67 E9/L (ref 1.8–7.3)
NEUTROPHILS RELATIVE PERCENT: 79.6 % (ref 43–80)
PDW BLD-RTO: 14 FL (ref 11.5–15)
PLATELET # BLD: 570 E9/L (ref 130–450)
PMV BLD AUTO: 9.1 FL (ref 7–12)
POTASSIUM SERPL-SCNC: 4.9 MMOL/L (ref 3.5–5)
PROSTATE SPECIFIC ANTIGEN: 1.52 NG/ML (ref 0–4)
RBC # BLD: 5.24 E12/L (ref 3.8–5.8)
SODIUM BLD-SCNC: 139 MMOL/L (ref 132–146)
TRIGL SERPL-MCNC: 179 MG/DL (ref 0–149)
TSH SERPL DL<=0.05 MIU/L-ACNC: 2.2 UIU/ML (ref 0.27–4.2)
URIC ACID, SERUM: 5.7 MG/DL (ref 3.4–7)
VLDLC SERPL CALC-MCNC: 36 MG/DL
WBC # BLD: 14.7 E9/L (ref 4.5–11.5)

## 2020-08-13 PROCEDURE — 83036 HEMOGLOBIN GLYCOSYLATED A1C: CPT

## 2020-08-13 PROCEDURE — 80061 LIPID PANEL: CPT

## 2020-08-13 PROCEDURE — G0103 PSA SCREENING: HCPCS

## 2020-08-13 PROCEDURE — 85025 COMPLETE CBC W/AUTO DIFF WBC: CPT

## 2020-08-13 PROCEDURE — 84550 ASSAY OF BLOOD/URIC ACID: CPT

## 2020-08-13 PROCEDURE — 80048 BASIC METABOLIC PNL TOTAL CA: CPT

## 2020-08-13 PROCEDURE — 84443 ASSAY THYROID STIM HORMONE: CPT

## 2020-08-19 ENCOUNTER — HOSPITAL ENCOUNTER (OUTPATIENT)
Dept: OCCUPATIONAL THERAPY | Age: 55
Setting detail: THERAPIES SERIES
Discharge: HOME | End: 2020-08-19
Payer: MEDICAID

## 2020-08-19 PROCEDURE — 97112 NEUROMUSCULAR REEDUCATION: CPT | Performed by: OCCUPATIONAL THERAPY ASSISTANT

## 2020-08-19 NOTE — PROGRESS NOTES
OCCUPATIONAL THERAPY PROGRESS NOTE    Date:  2020    Initial Evaluation Date: 2020                Evaluating Therapist: Sunita Jones     Patient Name:  Tamie Beltran                      :  1965     Restrictions/Precautions:  Sling for flaccid L UE, low fall risk  Diagnosis:  I63.9 (ICD-10-CM) - Acute CVA (cerebrovascular accident) ([address] (L-sided hemiplegia)                  Insurance/Certification information:  Rodo Oneill  Referring Practitioner:  Dr. Martínez Kamara MD  Date of Surgery/Injury: 2020  Plan of care signed (Y/N):  N  Visit# / total visits:     Pain Level: 6/10 L shoulder   Subjective: Pt stated \"I scratched my belly with my L hand\"     OBJECTIVE: [employer] ex's         RI   DFT  RP  MJ  DFSL  Protocol Comments   # /80 0   6 2 106 4 AA Random     # 2/160    0 6 8 107 4 AA Random  Focus on  DFT and MJ   # 3/240    0 6 18 109 4 AA Random  Focus on  DFT and MJ   # 4/320    0 6 29 106 4  AA Random  Focus on  DFT & MJ   #5/400     0 5 14 108 4 Error Aug 2x Focus on RI and MJ   #6/480    0 4 39 111 4  Error Aug 2x    #7/560    0 5 13 106 4  Error Aug 2x Focus on  MJ & DFT   #8/640 0   4 15 112 5 Error Aug 2x Focus on   MJ and DFSL   #9/720  0 7 11 101 3 AA  Focus on MJ                    Stabilization 100             160 Seconds   4 Way Pong             Level 15  Score-600   Maze              72.33                        Pt . Performed a total of 768 movements including pre and post testing plus 160 secs of stabilization 100, maze ax and 4 way pong activity. Pt. Demonstrated improvements from pre to post testing in max velocity from 0.173 to 0.193, path error from 0.008 to 0.007 and initation time from 0.044s to 0.035s. All [employer] ex's are performed to increase function of LUE to  assist with ADL's and IADL's at home. Pt does appreciate the immediate feedback that is provided using [employer] arm.  Pt is highly motivated & continues to engage in ADLs & IADLs in the home.      Assessment/Comments: Pt is making Good progress toward stated plan of care. Pt remains highly motivated     -Rehab Potential: Good     -Requires OT Follow Up: Yes  Time In: 1400       Time Out: 1500     Treatment Charges: Mins Units   Modalities       Ther Exercise       Manual Therapy       Thera Activities       ADL/Home Mgt        Neuro Re-education 60 4   Group Therapy       Non-Billable Service Time       Other       Total Time/Units 60 Min  4      -Response to Treatment: Good  Plan:   x  Continues Plan of care: Pt education continues at each visit to obtain maximum benefits from skilled OT intervention. ?  Alter Plan of care:   ?K[place]an [place] CASTILLO/L [id number]     I have read & agree with the above status.   Travis Awan OTR/L [id number]

## 2020-08-24 ENCOUNTER — HOSPITAL ENCOUNTER (OUTPATIENT)
Dept: OCCUPATIONAL THERAPY | Age: 55
Setting detail: THERAPIES SERIES
Discharge: HOME | End: 2020-08-24
Payer: MEDICAID

## 2020-08-24 PROCEDURE — 97110 THERAPEUTIC EXERCISES: CPT | Performed by: OCCUPATIONAL THERAPIST

## 2020-08-24 PROCEDURE — 97140 MANUAL THERAPY 1/> REGIONS: CPT | Performed by: OCCUPATIONAL THERAPIST

## 2020-08-24 PROCEDURE — 97530 THERAPEUTIC ACTIVITIES: CPT | Performed by: OCCUPATIONAL THERAPIST

## 2020-08-24 NOTE — PROGRESS NOTES
OCCUPATIONAL THERAPY PROGRESS NOTE    Date:  2020  Initial Evaluation Date: 2020                Evaluating Therapist: Shane Cabello     Patient Name:  Minor Castaneda                      :  1965     Restrictions/Precautions:  Sling for flaccid L UE, low fall risk  Diagnosis:  I63.9 (ICD-10-CM) - Acute CVA (cerebrovascular accident) ([address] (L-sided hemiplegia)                  Insurance/Certification information:  Orma Dial  Referring Practitioner:  Dr. Saray Coreas MD  Date of Surgery/Injury: 2020  Plan of care signed (Y/N):  Y  Visit# / total visits:     Pain Level: 4-5 on scale of 1-10 in distal L UE and back, aching, needle-like, sharp, uncomfortable and variable intensity    Subjective: Pt reports \"I got a lot more movement in my shoulder, look! (Pt able to move shoulder actively in multiple planes below 90*). I can even close my hand, I couldn't do this last time you saw me! I've really been pushing myself. \"     Objective:  Updated POC to be completed by 10th session. INTERVENTION: COMPLETED: REPS/TIME: SPECIFICS/COMMENTS:   Modality:      Fluidotherapy       MHP      AROM/AAROM:      AAROM R UE All Planes Lying Supine  25 reps ea Complete scapular retraction/protraction, shoulder flex/ext, horizontal abduction/adduction, elbow flex/ext, wrist flex/ext with assist of unaffected UE. Added to HEP. Lift and Place x 10 reps ea Pt to lift into shoulder flexion and abduction and hold at 90*, then relax while therapist lowers extremity. Min A to supervision required for the active movements. Table Towel Ex's x 25 reps ea Shoulder flex/abd/horiz abd/add, elbow ext/flex, scapular pro/retract. No assist required. PROM/Stretching:      L UE All Planes x 10 reps ea Completed while seated upright.    Neuro Re-Ed:      Weight Bearing for Proprioceptive Input x 10 min Lying on L side to WB into shoulder, then onto elbow, then seated upright with L hand flat on mat and elbow blocked with max A. -WBing hand into putty. Therapeutic Activity:                  Strengthening:      Putty Strengthening x 10 min Provided with yellow soft resistance putty for HEP. Completed gripping and pulling apart, min A required for digit extension. Resistive Strengthening x 10 reps ea Elbow flex/ext against therapist.   Other:      Soft tissue  x 8 min To L shoulder to reduce tightness. Sling x  Readjusted new splint for proper positioning. Re-educated on wear and care. KT x 10 min Completed taping for repositioning of glenohumeral head d/t shoulder subluxation. Pt reported instant pain relief. Assessment/Comments: Pt is making Fair progress toward stated plan of care. Pt returns to therapy after a couple weeks of difficulty scheduling d/t conflicts and pt's availability/pain. He has still been attending his [employer] sessions. Pt presents today with decreased pain in arm and active movement of the whole UE. Pt able to actively lift arm anywhere from 45*-80* at shoulder, he is able to actively extend and flex the elbow, and he is now able to flex his wrist and digits but is still unable to extend. Pt has made substantial gains since being last seen, but is still limited by range, active motion, pain, and functional use of the L UE. Pt does fatigue easily. Immediate pain relief and positioning results seen with KT. Will continue technique as pt benefits. Reviewed current HEP and added putty strengthening and towel ex's today. Will continue with POC and as pt tolerates. -Rehab Potential: Good  -Requires OT Follow Up:  Yes              Time In: 12:05 pm            Time Out: 1:00 pm     Treatment Charges: Mins Units   Modalities     Ther Exercise 25 2   Manual Therapy 15 1   Thera Activities 15 1   ADL/Home Mgt      Neuro Re-education     Gait Training     Group Therapy     Non-Billable Service Time     Other     Total Time/Units 55 4       -Response to Treatment: Pt tolerated treatment session well today. Goal Progress: Goals for pt can be see on initial eval occurring on 7/13/2020. Pt. Education:  [x] Yes  [] No  [] Reviewed Prior HEP/Ed  Method of Education: [x] Verbal  [x] Demo  [] Written  Comprehension of Education:  [x] Verbalizes understanding. [x] Demonstrates understanding. [x] Needs review. [] Demonstrates/verbalizes HEP/Ed previously given. Plan:   [x]  Continues Plan of care: Treatment covered based on POC and graduated to patient's progress. Pt education continues at each visit to obtain maximum benefits from skilled OT intervention.   []  Alter Plan of care:   []  Discharge:    Ashleigh [address], OTR/L #[id number]

## 2020-08-24 NOTE — PROGRESS NOTES
Nerve Block    Date/Time: 8/24/2020 12:20 PM  Performed by: Susy Fernandez DO  Authorized by: Susy Fernandez DO     Block Type:  Interscalene  Patient Location:  Pre-op  Indication: post-op pain management at surgeon's request    patient identified, IV checked, risks and benefits discussed, surgical consent, monitors and equipment checked, pre-op evaluation and timeout performed    Patient Position:  Sitting  Prep:  Chlorhexidine gluconate (CHG)  Max Sterile Barrier Technique:  Hand Washing, Cap/Mask and Sterile gloves  Monitoring:  Continuous pulse oximetry, blood pressure and EKG  Injection Technique:  Single-shot  Local Infiltration:  Lidocaine  Needle Type:  Echogenic  Needle Gauge:  21 G  Needle Length:  5 cm  Physical status during block:  Awake  Injection Assessment:  Negative aspiration for heme, no resistance to injection and no paresthesia on injection  Patient Condition:  Tolerated well, no immediate complications  Paresthesia Pain:  None  Heart Rate Change: No    Slowly Injected: Yes    Start Time:  8/24/2020 12:10 PM         Unable to perform mri until the questionnaire is completed.

## 2020-08-25 ENCOUNTER — HOSPITAL ENCOUNTER (OUTPATIENT)
Dept: PHYSICAL THERAPY | Age: 55
Setting detail: THERAPIES SERIES
Discharge: HOME | End: 2020-08-25
Payer: MEDICAID

## 2020-08-25 NOTE — PROGRESS NOTES
Date:  8/25/2020          Dear Dr. Elizabeth Wiley: This is to inform you that, as per [facility] Physical Therapy department policy, your patient Tamie Beltran is as of todays date being discharged from Physical Therapy secondary to the following reasons:       Pt contacted PT reporting increased LB/B knee pain since first visit for rehab of his CVA; tells PT that the activities cause too much aching and he is going to forego further PT sessions and focus on his OT for UE return; PT informed pt that he is doing well enough that this should not be an issue but can always revisit therapy if status changes. If you have any questions, please feel free to call at [phone]      Thank you          Kristopher Allen    [phone]    The information contained in this Fax the designated recipients intend message only for the personal and confidential use named above. This information is to be handled as privileged and confidential.  If the reader of this message is not the intended recipient, you are hereby notified that you have received this document in error and that any review, dissemination, distribution or copying of this message is strictly prohibited. If you receive this communication in error, please notify us immediately at the above number and return the original to us by mail.   Thank you      [address] [place], [place] [address] [phone]

## 2020-08-26 ENCOUNTER — HOSPITAL ENCOUNTER (OUTPATIENT)
Dept: OCCUPATIONAL THERAPY | Age: 55
Setting detail: THERAPIES SERIES
Discharge: HOME | End: 2020-08-26
Payer: MEDICAID

## 2020-08-26 ENCOUNTER — OFFICE VISIT (OUTPATIENT)
Dept: FAMILY MEDICINE CLINIC | Age: 55
End: 2020-08-26
Payer: MEDICAID

## 2020-08-26 VITALS
OXYGEN SATURATION: 98 % | WEIGHT: 134.2 LBS | BODY MASS INDEX: 19.21 KG/M2 | HEART RATE: 102 BPM | HEIGHT: 70 IN | DIASTOLIC BLOOD PRESSURE: 74 MMHG | SYSTOLIC BLOOD PRESSURE: 122 MMHG | RESPIRATION RATE: 18 BRPM | TEMPERATURE: 98.4 F

## 2020-08-26 PROCEDURE — 97112 NEUROMUSCULAR REEDUCATION: CPT | Performed by: OCCUPATIONAL THERAPY ASSISTANT

## 2020-08-26 PROCEDURE — G8420 CALC BMI NORM PARAMETERS: HCPCS | Performed by: FAMILY MEDICINE

## 2020-08-26 PROCEDURE — 4004F PT TOBACCO SCREEN RCVD TLK: CPT | Performed by: FAMILY MEDICINE

## 2020-08-26 PROCEDURE — 3023F SPIROM DOC REV: CPT | Performed by: FAMILY MEDICINE

## 2020-08-26 PROCEDURE — G8926 SPIRO NO PERF OR DOC: HCPCS | Performed by: FAMILY MEDICINE

## 2020-08-26 PROCEDURE — 3017F COLORECTAL CA SCREEN DOC REV: CPT | Performed by: FAMILY MEDICINE

## 2020-08-26 PROCEDURE — 99214 OFFICE O/P EST MOD 30 MIN: CPT | Performed by: FAMILY MEDICINE

## 2020-08-26 PROCEDURE — G8427 DOCREV CUR MEDS BY ELIG CLIN: HCPCS | Performed by: FAMILY MEDICINE

## 2020-08-26 RX ORDER — ICOSAPENT ETHYL 1000 MG/1
2 CAPSULE ORAL 2 TIMES DAILY
Qty: 360 CAPSULE | Refills: 1 | Status: SHIPPED
Start: 2020-08-26 | End: 2020-09-03

## 2020-08-26 ASSESSMENT — ENCOUNTER SYMPTOMS
SORE THROAT: 0
BACK PAIN: 0
WHEEZING: 0
APNEA: 0
EYE REDNESS: 0
BLURRED VISION: 0
TROUBLE SWALLOWING: 0
ORTHOPNEA: 0
ABDOMINAL DISTENTION: 0
RECTAL PAIN: 0
FACIAL SWELLING: 0
VOMITING: 0
ALLERGIC/IMMUNOLOGIC NEGATIVE: 1
PHOTOPHOBIA: 0
ANAL BLEEDING: 0
SINUS PRESSURE: 0
BLOOD IN STOOL: 0
SHORTNESS OF BREATH: 0
ABDOMINAL PAIN: 0
HEMOPTYSIS: 0
SINUS PAIN: 0
HEARTBURN: 0
STRIDOR: 0
DIARRHEA: 0
CONSTIPATION: 0
EYE DISCHARGE: 0
NAUSEA: 0
EYE ITCHING: 0
CHEST TIGHTNESS: 0
COUGH: 0
RHINORRHEA: 0
EYE PAIN: 0
CHOKING: 0
GASTROINTESTINAL NEGATIVE: 1
COLOR CHANGE: 0
VOICE CHANGE: 0

## 2020-08-26 NOTE — PROGRESS NOTES
Sylvia Martinez is a 54 y.o. male. HPI/Chief C/O:  Chief Complaint   Patient presents with    Results     To discuss lab results.  Discuss Medications     Patient stopped taking his lipitor due to side effects, about 2-3 weeks. No Known Allergies  The patient is here for a medication list and treatment planning review  We will go over our care planning goals as well as take care of all refills  We will set up labs as well   He is post CVA    Knee Pain    The incident occurred more than 1 week ago. The pain is present in the left knee and right knee. The quality of the pain is described as shooting and stabbing. The pain is at a severity of 8/10. The pain is severe. The pain has been worsening since onset. Associated symptoms include a loss of motion, muscle weakness and numbness. Pertinent negatives include no inability to bear weight, loss of sensation or tingling. Hypertension   Pertinent negatives include no anxiety, blurred vision, chest pain, headaches, malaise/fatigue, neck pain, orthopnea, palpitations, peripheral edema, PND, shortness of breath or sweats. Risk factors for coronary artery disease include smoking/tobacco exposure, stress, male gender, family history and dyslipidemia. Past treatments include lifestyle changes. The current treatment provides significant improvement. Compliance problems include exercise, diet and psychosocial issues. Hypertensive end-organ damage includes CVA. There is no history of angina, kidney disease, CAD/MI, heart failure, left ventricular hypertrophy, PVD or retinopathy. There is no history of chronic renal disease, coarctation of the aorta, hyperaldosteronism, hypercortisolism, hyperparathyroidism, a hypertension causing med, pheochromocytoma, renovascular disease, sleep apnea or a thyroid problem. Cough   This is a chronic problem. The current episode started more than 1 year ago. The problem has been unchanged.  The problem occurs every few hours. The cough is non-productive. Associated symptoms include myalgias. Pertinent negatives include no chest pain, chills, ear congestion, ear pain, eye redness, fever, headaches, heartburn, hemoptysis, nasal congestion, postnasal drip, rash, rhinorrhea, sore throat, shortness of breath, sweats, weight loss or wheezing. He has tried nothing for the symptoms. His past medical history is significant for emphysema. There is no history of asthma, bronchiectasis, bronchitis, environmental allergies or pneumonia. ROS:  Review of Systems   Constitutional: Positive for fatigue. Negative for activity change, appetite change, chills, diaphoresis, fever, malaise/fatigue, unexpected weight change and weight loss. HENT: Negative for congestion, dental problem, drooling, ear discharge, ear pain, facial swelling, hearing loss, mouth sores, nosebleeds, postnasal drip, rhinorrhea, sinus pressure, sinus pain, sneezing, sore throat, tinnitus, trouble swallowing and voice change. Eyes: Negative for blurred vision, photophobia, pain, discharge, redness, itching and visual disturbance. Respiratory: Negative for apnea, cough, hemoptysis, choking, chest tightness, shortness of breath, wheezing and stridor. Cardiovascular: Negative for chest pain, palpitations, orthopnea, leg swelling and PND. Gastrointestinal: Negative. Negative for abdominal distention, abdominal pain, anal bleeding, blood in stool, constipation, diarrhea, heartburn, nausea, rectal pain and vomiting. Endocrine: Negative. Negative for cold intolerance, heat intolerance, polydipsia, polyphagia and polyuria. Genitourinary: Negative. Negative for decreased urine volume, difficulty urinating, discharge, dysuria, enuresis, flank pain, frequency, genital sores, hematuria, penile pain, penile swelling, scrotal swelling, testicular pain and urgency. Musculoskeletal: Positive for arthralgias, gait problem and myalgias.  Negative for back pain, joint distress. Breath sounds: Normal breath sounds. No stridor. No wheezing, rhonchi or rales. Chest:      Chest wall: No tenderness. Abdominal:      General: Bowel sounds are normal. There is no distension. Palpations: Abdomen is soft. There is no mass. Tenderness: There is no abdominal tenderness. There is no right CVA tenderness, left CVA tenderness, guarding or rebound. Hernia: No hernia is present. Genitourinary:     Penis: No tenderness. Musculoskeletal: Normal range of motion. General: Tenderness present. No swelling, deformity or signs of injury. Right lower leg: No edema. Left lower leg: No edema. Comments: Bilateral knee pains      Lymphadenopathy:      Cervical: No cervical adenopathy. Skin:     General: Skin is warm. Coloration: Skin is not jaundiced or pale. Findings: No bruising, erythema, lesion or rash. Neurological:      General: No focal deficit present. Mental Status: He is alert and oriented to person, place, and time. Cranial Nerves: No cranial nerve deficit. Sensory: No sensory deficit. Motor: No weakness or abnormal muscle tone. Coordination: Coordination normal.      Gait: Gait normal.      Deep Tendon Reflexes: Reflexes are normal and symmetric. Reflexes normal.      Comments: Left side weakness post CVA         ASSESSMENT/PLAN  Angi Rubi was seen today for results and discuss medications. Diagnoses and all orders for this visit:    Centrilobular emphysema ([address]  -     Basic Metabolic Panel; Future  -     CBC Auto Differential; Future  --PLAN--aerosol accuneb 1.25 plus chest percussion--Rx      H/O: CVA (cerebrovascular accident)  -     Basic Metabolic Panel;  Future  -     CBC Auto Differential; Future    ---VASCULAR PANEL  A) ASA, PLAVIX, aggrenox  B) coumadin, pletal, tzd, statin  C) ace, hctz, FOLIC, ccb  D) cannikinumab, FISH OILS    ---CARDIAC---ASA, ace, beta, statin, hctz, ( ccb )    Mixed hyperlipidemia  -     Basic Metabolic Panel; Future  -     Lipid Panel; Future  -     CBC Auto Differential; Future  -     Icosapent Ethyl (VASCEPA) 1 g CAPS capsule; Take 2 capsules by mouth 2 times daily  --Mediterranean diet, exercise, weight loss, vitamins    We have a long talk on cholesterol and importance of lowering it       Tobacco abuse  -     Basic Metabolic Panel; Future  -     CBC Auto Differential; Future    IFG (impaired fasting glucose)  -     Basic Metabolic Panel; Future  -     CBC Auto Differential; Future  -     Hemoglobin A1C; Future  -     Hemoglobin A1C; Future    Fatigue, unspecified type  -     Lactate Dehydrogenase; Future        Outpatient Encounter Medications as of 8/26/2020   Medication Sig Dispense Refill    Icosapent Ethyl (VASCEPA) 1 g CAPS capsule Take 2 capsules by mouth 2 times daily 360 capsule 1    traMADol (ULTRAM) 50 MG tablet Take 1 tablet by mouth every 8 hours as needed for Pain for up to 30 days. Take lowest dose possible to manage pain 90 tablet 1    ibuprofen (ADVIL;MOTRIN) 800 MG tablet Take 1 tablet by mouth nightly 30 tablet 3    gabapentin (NEURONTIN) 300 MG capsule Take 1 capsule by mouth 2 times daily for 30 days. 60 capsule 3    clopidogrel (PLAVIX) 75 MG tablet Take 1 tablet by mouth daily 90 tablet 1    aspirin 81 MG chewable tablet Take 1 tablet by mouth daily 90 tablet 1    folic acid (FOLVITE) 1 MG tablet Take 1 tablet by mouth daily 90 tablet 1    [DISCONTINUED] atorvastatin (LIPITOR) 80 MG tablet Take 1 tablet by mouth nightly (Patient not taking: Reported on 8/26/2020) 90 tablet 1    nicotine (NICODERM CQ) 14 MG/24HR Place 1 patch onto the skin daily (Patient not taking: Reported on 7/15/2020) 30 patch 3     No facility-administered encounter medications on file as of 8/26/2020. Return in about 3 months (around 11/26/2020).         Reviewed recent labs related to Christiano's current problems      Discussed importance of regular Health Maintenance follow up  Health Maintenance   Topic    Hepatitis C screen     Pneumococcal 0-64 years Vaccine (1 of 1 - PPSV23)    HIV screen     DTaP/Tdap/Td vaccine (1 - Tdap)    Shingles Vaccine (1 of 2)    Colon cancer screen colonoscopy     Flu vaccine (1)    Lipid screen     Hepatitis A vaccine     Hepatitis B vaccine     Hib vaccine     Meningococcal (ACWY) vaccine

## 2020-08-26 NOTE — PROGRESS NOTES
OCCUPATIONAL THERAPY PROGRESS NOTE    Date:  2020    Initial Evaluation Date: 2020                Evaluating Therapist: Sunita oJnes     Patient Name:  Tamie Beltran                      :  1965     Restrictions/Precautions:  Sling for flaccid L UE, low fall risk  Diagnosis:  I63.9 (ICD-10-CM) - Acute CVA (cerebrovascular accident) ([address] (L-sided hemiplegia)                  Insurance/Certification information:  Rodo Oneill  Referring Practitioner:  Dr. Martínez Kamara MD  Date of Surgery/Injury: 2020  Plan of care signed (Y/N):  N  Visit# / total visits:     Pain Level: 6/10 L shoulder   Subjective: Pt stated \"I used my LUE to wash dishes\"     OBJECTIVE: InMotion ex's         RI   DFT  RP  MJ  DFSL  Protocol Comments   # 80 0   6 8 101 4 AA Random     # 2/160    1 6 26 98 4 AA Random  Focus on  DFT and MJ   # 3/240    0 6 33 104 4 AA Random  Focus on  RI  and MJ   # 4/320    0 6 18 102 4  AA Random  Focus on  DFT & MJ   #5/400     0 5 13 97 3 Error Aug 2x Focus on RP and MJ   #6/480    2 5 29 99 3  Error Aug 2x    #7/560    0 5 22 99 3  Error Aug 2x Focus on  MJ & DFT   #8/640 0   5 27 102 3 Error Aug 2x Focus on   MJ and RP   #9/720  0 7 5 96 2 AA  Focus on MJ                    Stabilization 100             160 Seconds   Obstacle course             740 Level-8    Maze              96.08                        Pt . Performed a total of 864 movements including pre and post testing plus 160 secs of stabilization 100, maze ax and obstacle course ax.  Pt. Demonstrated improvements from pre to post testing in motion smoothness from 0.544 to 0.548, reach error from 0.010 to 0.009, max velocity from 0.141 to 0.183 and initiation time from 0.048 to 0.019. All InMotion ex's are performed to increase function of LUE to  assist with ADL's and IADL's at home. Pt does appreciate the immediate feedback that is provided using InMotion arm.  Pt is highly motivated & continues to engage in ADLs & IADLs in the home.      Assessment/Comments: Pt is making Good progress toward stated plan of care. Pt remains highly motivated     -Rehab Potential: Good     -Requires OT Follow Up: Yes  Time In: 1400       Time Out: 1500     Treatment Charges: Mins Units   Modalities       Ther Exercise       Manual Therapy       Thera Activities       ADL/Home Mgt        Neuro Re-education 60 4   Group Therapy       Non-Billable Service Time       Other       Total Time/Units 60 Min  4      -Response to Treatment: Good  Plan:   x  Continues Plan of care: Pt education continues at each visit to obtain maximum benefits from skilled OT intervention. ?[address] of care:   ?Madie Garcia CASTILLO/L [id number]   I have read & agree with the above status.     Ted Gan OTR/L [id number]

## 2020-08-26 NOTE — PATIENT INSTRUCTIONS
Patient Education        Learning About High Cholesterol  What is high cholesterol? High cholesterol means that you have too much cholesterol in your blood. Cholesterol is a type of fat. It's needed for many body functions, such as making new cells. Cholesterol is made by your body. It also comes from food you eat. Having high cholesterol can lead to the buildup of plaque in artery walls. This can increase your risk of heart disease and stroke. When your doctor talks about high cholesterol levels, he or she is talking about your total cholesterol and LDL cholesterol (the \"bad\" cholesterol) levels. Your doctor may also speak about HDL (the \"good\" cholesterol) levels. High HDL is linked with a lower risk for heart disease, heart attack, and stroke. Your cholesterol levels help your doctor find out your risk for having a heart attack or stroke. How can you prevent high cholesterol? A heart-healthy lifestyle can help you prevent high cholesterol. This lifestyle helps lower your risk for a heart attack and stroke. · Eat heart-healthy foods. ? Eat fruits, vegetables, whole grains (like oatmeal), dried beans and peas, nuts and seeds, soy products (like tofu), and fat-free or low-fat dairy products. ? Replace butter, margarine, and hydrogenated or partially hydrogenated oils with olive and canola oils. (Canola oil margarine without trans fat is fine.)  ? Replace red meat with fish, poultry, and soy protein (like tofu). ? Limit processed and packaged foods like chips, crackers, and cookies. · Be active. Exercise can improve your cholesterol level. Get at least 30 minutes of exercise on most days of the week. Walking is a good choice. You also may want to do other activities, such as running, swimming, cycling, or playing tennis or team sports. · Stay at a healthy weight. Lose weight if you need to. · Don't smoke. If you need help quitting, talk to your doctor about stop-smoking programs and medicines.  These can increase your chances of quitting for good. How is high cholesterol treated? The goal of treatment is to reduce your chances of having a heart attack or stroke. The goal is not to lower your cholesterol numbers only. · You may make lifestyle changes, such as eating healthy foods, not smoking, losing weight, and being more active. · You may have to take medicine. Follow-up care is a key part of your treatment and safety. Be sure to make and go to all appointments, and call your doctor if you are having problems. It's also a good idea to know your test results and keep a list of the medicines you take. Where can you learn more? Go to https://[employer]. org and sign in to your [employer] account. Enter W633 in the [employer] box to learn more about \"Learning About High Cholesterol. \"     If you do not have an account, please click on the \"Sign Up Now\" link. Current as of: December 16, 2019               Content Version: 12.5  © 4242-1736 Healthwise, Incorporated. Care instructions adapted under license by [facility] ([facility]). If you have questions about a medical condition or this instruction, always ask your healthcare professional. [place] [address] any warranty or liability for your use of this information.

## 2020-08-31 ENCOUNTER — TELEPHONE (OUTPATIENT)
Dept: FAMILY MEDICINE CLINIC | Age: 55
End: 2020-08-31

## 2020-08-31 ENCOUNTER — HOSPITAL ENCOUNTER (OUTPATIENT)
Dept: OCCUPATIONAL THERAPY | Age: 55
Setting detail: THERAPIES SERIES
Discharge: HOME | End: 2020-08-31
Payer: MEDICAID

## 2020-08-31 ENCOUNTER — OFFICE VISIT (OUTPATIENT)
Dept: NEUROLOGY | Age: 55
End: 2020-08-31
Payer: MEDICAID

## 2020-08-31 VITALS
OXYGEN SATURATION: 96 % | RESPIRATION RATE: 20 BRPM | DIASTOLIC BLOOD PRESSURE: 72 MMHG | BODY MASS INDEX: 19.33 KG/M2 | HEIGHT: 70 IN | WEIGHT: 135 LBS | SYSTOLIC BLOOD PRESSURE: 109 MMHG | HEART RATE: 88 BPM | TEMPERATURE: 97.6 F

## 2020-08-31 PROCEDURE — 3017F COLORECTAL CA SCREEN DOC REV: CPT | Performed by: CLINICAL NURSE SPECIALIST

## 2020-08-31 PROCEDURE — 97110 THERAPEUTIC EXERCISES: CPT | Performed by: OCCUPATIONAL THERAPIST

## 2020-08-31 PROCEDURE — 99215 OFFICE O/P EST HI 40 MIN: CPT | Performed by: CLINICAL NURSE SPECIALIST

## 2020-08-31 PROCEDURE — G8427 DOCREV CUR MEDS BY ELIG CLIN: HCPCS | Performed by: CLINICAL NURSE SPECIALIST

## 2020-08-31 PROCEDURE — G8420 CALC BMI NORM PARAMETERS: HCPCS | Performed by: CLINICAL NURSE SPECIALIST

## 2020-08-31 PROCEDURE — 97530 THERAPEUTIC ACTIVITIES: CPT | Performed by: OCCUPATIONAL THERAPIST

## 2020-08-31 PROCEDURE — 4004F PT TOBACCO SCREEN RCVD TLK: CPT | Performed by: CLINICAL NURSE SPECIALIST

## 2020-08-31 NOTE — PROGRESS NOTES
OCCUPATIONAL THERAPY PROGRESS NOTE    Date:  2020  Initial Evaluation Date: 2020                Evaluating Therapist: Elgin Howard     Patient Name:  Mary Pruitt                      :  1965     Restrictions/Precautions:  Sling for flaccid L UE, low fall risk  Diagnosis:  I63.9 (ICD-10-CM) - Acute CVA (cerebrovascular accident) ([address] (L-sided hemiplegia)                  Insurance/Certification information:  Peggy Funez  Referring Practitioner:  Dr. Alison Parmar MD  Date of Surgery/Injury: 2020  Plan of care signed (Y/N):  Y  Visit# / total visits:     Pain Level: 4-5 on scale of 1-10 in distal L UE and back, aching, needle-like, sharp, uncomfortable and variable intensity    Subjective: Pt reports \"That tape really helped take the pain away in my shoulder, can we keep doing it? The Dr also is requesting a splint for my hand, here's the script. \"      Objective:  Updated POC to be completed by 10th session. INTERVENTION: COMPLETED: REPS/TIME: SPECIFICS/COMMENTS:   Modality:      Fluidotherapy       MHP x     AROM/AAROM:      AAROM R UE All Planes Lying Supine x 25 reps ea Complete scapular retraction/protraction, shoulder flex/ext, horizontal abduction/adduction, elbow flex/ext, wrist flex/ext with assist of unaffected UE. Added to HEP. -Completed with hand after applying KT for extensor facilitation. Lift and Place x 10 reps ea Pt to lift into shoulder flexion and abduction and hold at 90*, then relax while therapist lowers extremity. Min A to supervision required for the active movements. Table Towel Ex's x 25 reps ea Shoulder flex/abd/horiz abd/add, elbow ext/flex, scapular pro/retract. No assist required. PROM/Stretching:      L UE All Planes  10 reps ea Completed while seated upright.    Neuro Re-Ed:      Weight Bearing for Proprioceptive Input  10 min Lying on L side to WB into shoulder, then onto elbow, then seated upright with L hand flat on mat and elbow blocked with max A. -WBing hand into putty. Therapeutic Activity:                  Strengthening:      Putty Strengthening x 10 min Provided with yellow soft resistance putty for HEP. Completed gripping and pulling apart, min A required for digit extension. Resistive Strengthening x 10 reps ea Elbow flex/ext against therapist.   Other:      Soft tissue   8 min To L shoulder to reduce tightness. Sling   Readjusted new splint for proper positioning. Re-educated on wear and care. Splint Fabrication x 15 min Fabricated pan splint to place digits in full extension to tighten extensors and reduce flaccidity. Will continue to make adjustments as needed. KT x 25 min Completed taping for repositioning of glenohumeral head d/t shoulder subluxation. Pt reported instant pain relief. Also completed extension facilitation to all digits and wrist this date with good results to improve functional use of the hand. Assessment/Comments: Pt is making Fair progress toward stated plan of care. Active ROM of the shoulder, elbow, and hand continue to improve, but extensors of hand remain flaccid. Attempted facilitation of extensors this date with KT to improve functional use of the L hand. Pt noting immediate results. Continue with taping for subluxation as pt also noted positive results and pain relief. Fabricated pan splint for night time to tighten extensors and prevent flexion tightening of digits. Pt complying well with HEP. Avoiding use of L UE during observation, encouraged functional use and had pt carry his water bottle with L hand successfully, which he didn't think he could do!     -Rehab Potential: Good  -Requires OT Follow Up:  Yes              Time In: 2:00 pm            Time Out: 1:00 pm     Treatment Charges: Mins Units   Modalities     Ther Exercise 20 1   Manual Therapy     Thera Activities 40 3   ADL/Home Mgt      Neuro Re-education     Gait Training     Group Therapy     Non-Billable Service Time     Other Total Time/Units 60 4       -Response to Treatment: Pt tolerated treatment session well today. Goal Progress: Goals for pt can be see on initial eval occurring on 7/13/2020. Pt. Education:  [x] Yes  [] No  [] Reviewed Prior HEP/Ed  Method of Education: [x] Verbal  [x] Demo  [] Written  Comprehension of Education:  [x] Verbalizes understanding. [x] Demonstrates understanding. [x] Needs review. [] Demonstrates/verbalizes HEP/Ed previously given. Plan:   [x]  Continues Plan of care: Treatment covered based on POC and graduated to patient's progress. Pt education continues at each visit to obtain maximum benefits from skilled OT intervention.   []  Alter Plan of care:   []  Discharge:    Nikki P[address], OTR/L #[id number]

## 2020-08-31 NOTE — PROGRESS NOTES
V: mastication Normal   V: facial light touch sensation  Normal   V,VII: corneal reflex  Present   VII: facial muscle function - upper     VII: facial muscle function - lower Normal   VIII: hearing Normal   IX: soft palate elevation  Normal   IX,X: gag reflex Present   XI: trapezius strength  5/5   XI: sternocleidomastoid strength 5/5   XI: neck extension strength  5/5   XII: tongue strength  Normal     Motor:  5/5 throughout right arm and leg  5/5 left IPS, ant tibs and 5/5 left gastroc  4/5 left bicep and 3/5 left    Normal bulk and tone    Sensory:  Normal to LT     Coordination:   FN, FFM and BONNY decreased left relative to weakness     Gait with cane     No Babinski  No Khan's     Laboratory/Radiology:     CBC with Differential:    Lab Results   Component Value Date    WBC 14.7 08/13/2020    RBC 5.24 08/13/2020    HGB 16.1 08/13/2020    HCT 50.2 08/13/2020     08/13/2020    MCV 95.8 08/13/2020    MCH 30.7 08/13/2020    MCHC 32.1 08/13/2020    RDW 14.0 08/13/2020    LYMPHOPCT 12.3 08/13/2020    MONOPCT 7.0 08/13/2020    BASOPCT 0.3 08/13/2020    MONOSABS 1.02 08/13/2020    LYMPHSABS 1.80 08/13/2020    EOSABS 0.07 08/13/2020    BASOSABS 0.05 08/13/2020     CMP:    Lab Results   Component Value Date     08/13/2020    K 4.9 08/13/2020    K 4.1 06/25/2020     08/13/2020    CO2 25 08/13/2020    BUN 7 08/13/2020    CREATININE 0.8 08/13/2020    GFRAA >60 08/13/2020    LABGLOM >60 08/13/2020    GLUCOSE 96 08/13/2020    PROT 7.6 06/21/2020    LABALBU 4.5 06/21/2020    CALCIUM 10.0 08/13/2020    BILITOT 0.5 06/21/2020    ALKPHOS 80 06/21/2020    AST 16 06/21/2020    ALT 8 06/21/2020     HgBA1c:    Lab Results   Component Value Date    LABA1C 5.3 08/13/2020     FLP:    Lab Results   Component Value Date    TRIG 179 08/13/2020    HDL 37 08/13/2020    [address] 171 08/13/2020    LABVLDL 36 08/13/2020     CT Head:  An evolving nonhemorrhagic late acute infarct in the right basal  ganglia.  If further imaging is clinically warranted, consider MRI. Old infarct with encephalomalacia in the left parieto-occipital  region. CTA:  1. There is occlusion of the left common carotid artery at its origin  due to severe atherosclerosis. With persistent obstruction throughout  the length of the internal carotid artery. 2. No other hemodynamically significant stenosis is identified. No  aneurysm is identified. CTP:  There is no CT evidence of a perfusion abnormality. Carotid US  Atherosclerotic disease. No hemodynamically significant stenosis is  identified  Estimated stenosis by NASCET criteria in the proximal right carotid  artery is between 0% and 49%. Estimated stenosis by NASCET criteria in the l left carotid artery is  100%, with complete occlusion by thrombus at the bulb, with less than  a 1 cm \"stump\" of patent lumen. MRI Brain:  1. Subacute infarct involving the right basal ganglia and posterior  limb of the internal capsule. 2. No evidence of intracranial hemorrhage or major mass effect. 3. Small area of encephalomalacia and adjacent gliosis in the left  parieto-occipital region, compatible with a chronic infarct. 4. Nonspecific foci of abnormal signal in the bilateral cerebral white  matter, which may be due to chronic microvascular ischemia, chronic  hypertension, vasculopathy, inflammatory or demyelinating disorder, or  other etiology. 5. Mucous retention cysts and/or mucosal thickening in the left  maxillary, ethmoid, and sphenoid sinuses. Echo with bubble:   Left ventricular internal dimensions were normal in diastole and systole. No regional wall motion abnormalities seen. Normal left ventricular ejection fraction. No intracardiac shunt via saline contrast injection including valsalva    I personally reviewed the patient's lab and imaging studies at this time.     Assessment:     Right basal ganglionic infarct most likely from    History of alcohol and tobacco abuse     Carotid occlusion - left side and does not correlate with acute infarct distribution   Most likely chronic and vascular surgery recommended conservative therapy    I do appreciate a PCOM on that left side which is why he may have been fairly asymptomatic with this occlusion     Plan:     Continue DAPT until end of September   Then will continue on monotherapy     recommend statin   Patient refused d/t tolerability   Will start fish oil regimen with PCP    Smoking cessation referral placed      RTO 10 weeks     Yoselin Half  1:33 PM  8/31/2020

## 2020-09-01 NOTE — TELEPHONE ENCOUNTER
PA pending. Juan Diego Barajas ([place]: [id number])   Vascepa 1GM capsules   Form  [employer] Electronic PA Form (NCPDP)   Created   2 hours ago   Sent to Plan   2 hours ago   Plan Response   2 hours ago   Submit Clinical Questions   3 minutes ago   Determination   Wait for Determination  Please wait for [employer]NCP[employer] to return a determination.

## 2020-09-02 ENCOUNTER — HOSPITAL ENCOUNTER (OUTPATIENT)
Dept: OCCUPATIONAL THERAPY | Age: 55
Setting detail: THERAPIES SERIES
Discharge: HOME | End: 2020-09-02
Payer: MEDICAID

## 2020-09-03 RX ORDER — OMEGA-3 ACID ETHYL ESTERS 1 G
2 CAPSULE ORAL 2 TIMES DAILY
Qty: 120 CAPSULE | Refills: 3 | Status: SHIPPED
Start: 2020-09-03 | End: 2021-02-12

## 2020-09-08 ENCOUNTER — TELEPHONE (OUTPATIENT)
Dept: FAMILY MEDICINE CLINIC | Age: 55
End: 2020-09-08

## 2020-09-08 NOTE — TELEPHONE ENCOUNTER
Luis Manuel Mcclure     Key: [id number]    Status  Sent to Plan - today    Drug  Lovaza 1GM capsules    Form  [employer] Electronic PA Form (NCPDP)    Electronically signed by Julia Bates MA on 9/8/20 at 9:14 AM EDT

## 2020-09-08 NOTE — TELEPHONE ENCOUNTER
Tania Hooks     Key: [facility]    PA Case ID: [id number]    Outcome  Denied - today    Your PA request has been denied      Electronically signed by Kaley Silva on 9/8/20 at 11:21 AM EDT

## 2020-09-10 ENCOUNTER — HOSPITAL ENCOUNTER (OUTPATIENT)
Dept: OCCUPATIONAL THERAPY | Age: 55
Setting detail: THERAPIES SERIES
Discharge: HOME | End: 2020-09-10
Payer: MEDICAID

## 2020-09-10 PROCEDURE — 97140 MANUAL THERAPY 1/> REGIONS: CPT | Performed by: OCCUPATIONAL THERAPIST

## 2020-09-10 PROCEDURE — 97530 THERAPEUTIC ACTIVITIES: CPT | Performed by: OCCUPATIONAL THERAPIST

## 2020-09-10 PROCEDURE — 97110 THERAPEUTIC EXERCISES: CPT | Performed by: OCCUPATIONAL THERAPIST

## 2020-09-10 NOTE — PROGRESS NOTES
OCCUPATIONAL THERAPY PROGRESS NOTE    Date:  9/10/2020  Initial Evaluation Date: 2020                Evaluating Therapist: Eben Lovett     Patient Name:  Kasey Soto                      :  1965     Restrictions/Precautions:  Sling for flaccid L UE, low fall risk  Diagnosis:  I63.9 (ICD-10-CM) - Acute CVA (cerebrovascular accident) ([facility] [address] (L-sided hemiplegia)                  Insurance/Certification information:  Gavino Rutledge  Referring Practitioner:  Dr. Natalie Chan MD  Date of Surgery/Injury: 2020  Plan of care signed (Y/N):  Y  Visit# / total visits: 10 / 18    Pain Level: ^6 on scale of 1-10 in distal L UE and back, aching, needle-like, sharp, uncomfortable and variable intensity    Subjective: Pt reports \"I've been waiting for this tape, my shoulder is killing me and I think it's because of the way I slept on it. \"      Objective:  Updated POC to be completed by 10th session. INTERVENTION: COMPLETED: REPS/TIME: SPECIFICS/COMMENTS:   Modality:      Fluidotherapy       MHP x     AROM/AAROM:      AAROM R UE All Planes Lying Supine x 25 reps ea Complete scapular retraction/protraction, shoulder flex/ext, horizontal abduction/adduction, elbow flex/ext, wrist flex/ext with assist of unaffected UE. Added to HEP. -Completed with hand after applying KT for extensor facilitation. Lift and Place x 10 ^15 reps ea Pt to lift into shoulder flexion and abduction and hold at 90*, then relax while therapist lowers extremity. Then completed vise versa. Min A to supervision required for the active movements. UBE x 10 min 5 forward and 5 backwards with strap to hold L hand onto handle, no resistance. Table Towel Ex's  25 reps ea Shoulder flex/abd/horiz abd/add, elbow ext/flex, scapular pro/retract. No assist required. PROM/Stretching:      L UE All Planes x 10 reps ea Completed while seated upright.    Neuro Re-Ed:      Weight Bearing for Proprioceptive Input  10 min Lying on L side to WB into shoulder, then onto elbow, then seated upright with L hand flat on mat and elbow blocked with max A. -WBing hand into putty. Digit/Wrist Extension x 5 min Attempting various neuro techniques such as tapping to facilitate extensors during active movement. Therapeutic Activity:                  Strengthening:      Putty Strengthening  10 min Provided with yellow soft resistance putty for HEP. Completed gripping and pulling apart, min A required for digit extension. Resistive Strengthening x 15 reps ea Elbow flex/ext against therapist.  Added digit flexion into full fist.   Other:      Soft tissue   8 min To L shoulder to reduce tightness. Sling   Readjusted new splint for proper positioning. Re-educated on wear and care. Splint Fabrication Continues w/ wear 15 min Fabricated pan splint to place digits in full extension to tighten extensors and reduce flaccidity. Will continue to make adjustments as needed. Positioning Education x 10 min Completed in regards to sleeping at night. KT x 10 min Completed taping for repositioning of glenohumeral head d/t shoulder subluxation. Pt reported instant pain relief. Also completed extension facilitation to all digits and wrist this date with good results to improve functional use of the hand. Assessment/Comments: Pt is making Fair progress toward stated plan of care. Difficulty with AROM of shoulder this date and maintaining positions against gravity d/t fatigue and recent ^ in pain d/t sleeping on shoulder wrong. Very slight active extension of digits noted today against minimal resistance. Tape continues to provide instant pain relief. Discussed several various techniques for better positioning of shoulder while sleeping to improve rest and reduce overall pain.      -Rehab Potential: Good  -Requires OT Follow Up:  Yes              Time In: 2:00 pm            Time Out: 3:00 pm     Treatment Charges: Mins Units   Modalities     Ther Exercise 20 1   Manual Therapy

## 2020-09-11 ENCOUNTER — HOSPITAL ENCOUNTER (OUTPATIENT)
Dept: OCCUPATIONAL THERAPY | Age: 55
Setting detail: THERAPIES SERIES
Discharge: HOME | End: 2020-09-11
Payer: MEDICAID

## 2020-09-11 PROCEDURE — 97112 NEUROMUSCULAR REEDUCATION: CPT | Performed by: OCCUPATIONAL THERAPY ASSISTANT

## 2020-09-11 NOTE — PROGRESS NOTES
OCCUPATIONAL THERAPY PROGRESS NOTE    Date:  2020    Initial Evaluation Date: 2020                Evaluating Therapist: Nathanael Pierre     Patient Name:  Dorethea Shone                      :  1965     Restrictions/Precautions:  Sling for flaccid L UE, low fall risk  Diagnosis:  I63.9 (ICD-10-CM) - Acute CVA (cerebrovascular accident) ([facility] [address] (L-sided hemiplegia)                  Insurance/Certification information:  Balaji Patterson  Referring Practitioner:  Dr. Telma Nair MD  Date of Surgery/Injury: 2020  Plan of care signed (Y/N):  N  Visit# / total visits:     Pain Level: 610 L shoulder   Subjective: Pt stated \"I'm was able to put a long sleeve shirt on for the first time today\"     OBJECTIVE: InMotion ex's         RI   DFT  RP  MJ  DFSL  Protocol Comments   # 1/80 0   6 19 100 4 AA Random     # 2/160    0 6 17 100 3 AA Random  Focus on  DFT and MJ   # 3/240    0 6 9 102 3 AA Random  Focus on  RP  and MJ   # 4/320    0 6 14 102 3  AA Random  Focus on  DFT & MJ   #5/400     0 5 8 101 3 Error Aug 2x Focus on RP and MJ   #6/480    0 5 15 102 3  Error Aug 2x    #7/560    0 5 13 100 4  Error Aug 2x Focus on  MJ & DFT   #8/640 0   4 11 102 3 Error Aug 2x Focus on  MJ and DFT   #9/720                           Stabilization 100             160 Seconds   Resistance 50              80 reps                                          Pt . Performed a total of 784 movements including pre and post testing plus 160 secs of stabilization 100  Pt. Demonstrated improvements from pre to post testing in max velocity from 0.153 to 0.193 and initiation time from 0.045 to 0.043 today. All InMotion ex's are performed to increase function of LUE to  assist with ADL's and IADL's at home. Pt does appreciate the immediate feedback that is provided using InMotion arm.  Pt is highly motivated & continues to engage in ADLs & IADLs in the home.      Assessment/Comments: Pt is making Good progress toward stated plan of

## 2020-09-14 ENCOUNTER — HOSPITAL ENCOUNTER (OUTPATIENT)
Dept: OCCUPATIONAL THERAPY | Age: 55
Setting detail: THERAPIES SERIES
Discharge: HOME | End: 2020-09-14
Payer: MEDICAID

## 2020-09-14 PROCEDURE — 97530 THERAPEUTIC ACTIVITIES: CPT | Performed by: OCCUPATIONAL THERAPIST

## 2020-09-14 PROCEDURE — 97035 APP MDLTY 1+ULTRASOUND EA 15: CPT | Performed by: OCCUPATIONAL THERAPIST

## 2020-09-14 PROCEDURE — 97110 THERAPEUTIC EXERCISES: CPT | Performed by: OCCUPATIONAL THERAPIST

## 2020-09-14 PROCEDURE — 97140 MANUAL THERAPY 1/> REGIONS: CPT | Performed by: OCCUPATIONAL THERAPIST

## 2020-09-14 PROCEDURE — 97022 WHIRLPOOL THERAPY: CPT | Performed by: OCCUPATIONAL THERAPIST

## 2020-09-14 NOTE — PROGRESS NOTES
OCCUPATIONAL THERAPY PROGRESS NOTE    Date:  2020  Initial Evaluation Date: 2020                Evaluating Therapist: Moises Freitas     Patient Name:  Rose Rivas                      :  1965     Restrictions/Precautions:  Sling for flaccid L UE, low fall risk  Diagnosis:  I63.9 (ICD-10-CM) - Acute CVA (cerebrovascular accident) ([address] (L-sided hemiplegia)                  Insurance/Certification information:  Fabio Stringer  Referring Practitioner:  Dr. Jakub Bowens MD  Date of Surgery/Injury: 2020  Plan of care signed (Y/N):  Y  Visit# / total visits:     Pain Level: 5 on scale of 1-10 in distal L UE and back, aching, needle-like, sharp, uncomfortable and variable intensity    Subjective: Pt reports \"I've been working on my fist and it's getting better, but I'm still having a hard time opening this hand. I've been trying to use it more during the day. \"      Objective:  Updated POC to be completed by 10th session. INTERVENTION: COMPLETED: REPS/TIME: SPECIFICS/COMMENTS:   Modality:      Fluidotherapy  x 10 min To loosen soft tissue and reduce tightness/pain. Actively completed SROM in all available planes. MHP x     AROM/AAROM:      AAROM R UE All Planes x 25 reps ea Complete scapular retraction/protraction, shoulder flex/ext, horizontal abduction/adduction, elbow flex/ext, wrist flex/ext with assist of unaffected UE. Added to HEP. -Completed with hand after applying KT for extensor facilitation. Fist pumps x 15 reps Full fist into quick release, active extension noted this date. Lift and Place x 10 ^15 reps ea Pt to lift into shoulder flexion and abduction and hold at 90*, then relax while therapist lowers extremity. Then completed vise versa. Min A to supervision required for the active movements. UBE x 10 min 5 forward and 5 backwards with strap to hold L hand onto handle, level 2 resistance.    Table Towel Ex's x 25 reps ea Shoulder flex/abd/horiz abd/add, elbow ext/flex, scapular pro/retract. No assist required. PROM/Stretching:      L UE All Planes x 10 reps ea Completed while seated upright. Neuro Re-Ed:      Weight Bearing for Proprioceptive Input  10 min Lying on L side to WB into shoulder, then onto elbow, then seated upright with L hand flat on mat and elbow blocked with max A. -WBing hand into putty. Digit/Wrist Extension x 10 min Attempting various neuro techniques such as tapping to facilitate extensors during active movement. Therapeutic Activity:                  Strengthening:      Putty Strengthening  10 min Provided with yellow soft resistance putty for HEP. Completed gripping and pulling apart, min A required for digit extension. Resistive Strengthening x 15 reps ea Elbow flex/ext against therapist.  Added digit flexion into full fist.   Other:      Soft tissue   8 min To L shoulder to reduce tightness. Sling   Readjusted new splint for proper positioning. Re-educated on wear and care. Splint Fabrication Continues w/ wear 15 min Fabricated pan splint to place digits in full extension to tighten extensors and reduce flaccidity. Will continue to make adjustments as needed. Positioning Education Continues incorporating 10 min Completed in regards to sleeping at night. KT x 10 min Completed taping for repositioning of glenohumeral head d/t shoulder subluxation. Pt reported instant pain relief. Also completed extension facilitation to all digits and wrist this date with good results to improve functional use of the hand. Assessment/Comments: Pt is making Fair progress toward stated plan of care. Pt continues to benefit from taping. Quick extension release while incorporating mirroring with the other hand initiated notable active extension this date in the digits. Pt able to extend digits 3/5's from tight fist, last session he had max difficulty with getting any extension out of digits.  At resting hand is staying open at about 2/5's from

## 2020-09-16 ENCOUNTER — HOSPITAL ENCOUNTER (OUTPATIENT)
Dept: OCCUPATIONAL THERAPY | Age: 55
Setting detail: THERAPIES SERIES
Discharge: HOME | End: 2020-09-16
Payer: MEDICAID

## 2020-09-16 PROCEDURE — 97530 THERAPEUTIC ACTIVITIES: CPT | Performed by: OCCUPATIONAL THERAPY ASSISTANT

## 2020-09-16 PROCEDURE — 97112 NEUROMUSCULAR REEDUCATION: CPT | Performed by: OCCUPATIONAL THERAPY ASSISTANT

## 2020-09-16 NOTE — PROGRESS NOTES
OCCUPATIONAL THERAPY PROGRESS NOTE    Date:  2020    Initial Evaluation Date: 2020                Evaluating Therapist: Cecily Rodriguez     Patient Name:  Rachel Sullivan                      :  1965     Restrictions/Precautions:  Sling for flaccid L UE, low fall risk  Diagnosis:  I63.9 (ICD-10-CM) - Acute CVA (cerebrovascular accident) ([facility] [address] (L-sided hemiplegia)                  Insurance/Certification information:  [facility]  Referring Practitioner:  Dr. Ken Green MD  Date of Surgery/Injury: 2020  Plan of care signed (Y/N):  N  Visit# / total visits:     Pain Level: 7/10 L shoulder   Subjective: Pt stated \"I held a jar of peanut butter with my L hand while I scooped with my R hand\"     OBJECTIVE: InMotion ex's         RI   DFT  RP  MJ  DFSL  Protocol Comments   # 1/80 0   7 15 94 3 AA Random     # 2/160    0 6 12 99 3 AA Random  Focus on  DFT and MJ   # 3/240    0 6 6 99 3 AA Random  Focus on  RP  and MJ   # 4/320    0 6 9 99 4  AA Random  Focus on  DFT & MJ   #5/400     0 5 4 99 3 Error Aug 2x Focus on RP and MJ   #6/480    0 4 35 102 4  Error Aug 2x    #7/560    0 5 9 97 3  Error Aug 2x Focus on  MJ & DFT   #8/640 0   5 11 96 3 Error Aug 2x Focus on  MJ and DFT                              Stabilization 100             160 Seconds   Resistance 50              160 reps     Obstacle course             Score- 800 Level 10                        Pt . Performed a total of 864 movements including pre and post testing plus 160 secs of stabilization 100 and obstacle course ax.  Pt. Demonstrated improvements from pre to post testing in max velocity from 0.127 to 0.157, motion smoothness from 0.572 to 0.588 and initiation time from 0.046 to 0.031 today. All InMotion ex's are performed to increase function of LUE to  assist with ADL's and IADL's at home. Pt does appreciate the immediate feedback that is provided using InMotion arm.  Pt is highly motivated & continues to engage in ADLs & IADLs in the home.     Pt. kinesiotaped to decrease shoulder pain. Assessment/Comments: Pt is making Good progress toward stated plan of care. Pt remains highly motivated. Will continue to progress as tolerated. Pain continues to be a limiting factor.      -Rehab Potential: Good     -Requires OT Follow Up: Yes  Time In: 1400       Time Out: 1500     Treatment Charges: Mins Units   Modalities       Ther Exercise       Manual Therapy       Thera Activities       ADL/Home Mgt        Neuro Re-education 60 4   Group Therapy       Non-Billable Service Time       Other       Total Time/Units 60 Min  4      -Response to Treatment: Good  Plan:   x  Continues Plan of care: Pt education continues at each visit to obtain maximum benefits from skilled OT intervention. Will focus on [address] and strengthening with robotic arm therapy.    ?  Alter Plan of care:   ?[address] [place]/L [id number]

## 2020-09-21 ENCOUNTER — HOSPITAL ENCOUNTER (OUTPATIENT)
Dept: OCCUPATIONAL THERAPY | Age: 55
Setting detail: THERAPIES SERIES
Discharge: HOME | End: 2020-09-21
Payer: MEDICAID

## 2020-09-21 PROCEDURE — 97140 MANUAL THERAPY 1/> REGIONS: CPT | Performed by: OCCUPATIONAL THERAPIST

## 2020-09-21 PROCEDURE — 97112 NEUROMUSCULAR REEDUCATION: CPT | Performed by: OCCUPATIONAL THERAPIST

## 2020-09-21 PROCEDURE — 97110 THERAPEUTIC EXERCISES: CPT | Performed by: OCCUPATIONAL THERAPIST

## 2020-09-21 PROCEDURE — 97022 WHIRLPOOL THERAPY: CPT | Performed by: OCCUPATIONAL THERAPIST

## 2020-09-21 PROCEDURE — 97032 APPL MODALITY 1+ESTIM EA 15: CPT | Performed by: OCCUPATIONAL THERAPIST

## 2020-09-21 NOTE — PROGRESS NOTES
OCCUPATIONAL THERAPY PROGRESS NOTE    Date:  2020  Initial Evaluation Date: 2020                Evaluating Therapist: Shelly Zaidi     Patient Name:  Eliz Bachelor                      :  1965     Restrictions/Precautions:  Sling for flaccid L UE, low fall risk  Diagnosis:  I63.9 (ICD-10-CM) - Acute CVA (cerebrovascular accident) ([facility] [address] (L-sided hemiplegia)                  Insurance/Certification information:  Storm Given  Referring Practitioner:  Dr. Ana Paula Salgado MD  Date of Surgery/Injury: 2020  Plan of care signed (Y/N):  Y  Visit# / total visits:     Pain Level: 5 on scale of 1-10 in distal L UE and back, aching, needle-like, sharp, uncomfortable and variable intensity    Subjective: Pt reports \"My shoulder is killing me right here (points to insertion of Deltoid). I try to do things with this hand but it's so hard to remember sometimes, and I'm so used to having to help it I forget I can move it some on it's own. \"      Objective:  Updated POC to be completed by 10th session. INTERVENTION: COMPLETED: REPS/TIME: SPECIFICS/COMMENTS:   Modality:      NMES x 10 min 10 sec on 10 sec off at level 14 intensity. Close hand into fist when off, extend wrist/digits when on. Active extension observed. Will increase intensity as pt builds tolerance. Fluidotherapy  x 15 min To loosen soft tissue and reduce tightness/pain. Actively completed SROM in all available planes. MHP x 15 min Applied to L shoulder to reduce pain while in fluidotherapy. AROM/AAROM:      AAROM R UE All Planes x 25 reps ea Complete scapular retraction/protraction, shoulder flex/ext, horizontal abduction/adduction, elbow flex/ext, wrist flex/ext with assist of unaffected UE. Added to HEP. -Completed with hand after applying KT for extensor facilitation. Fist pumps x 15 reps Full fist into quick release, active extension noted this date.    Lift and Place  10 [id number] reps ea Pt to lift into shoulder flexion and abduction and hold at 90*, then relax while therapist lowers extremity. Then completed vise versa. Min A to supervision required for the active movements. UBE  10 min 5 forward and 5 backwards with strap to hold L hand onto handle, level 2 resistance. Table Towel Ex's  25 reps ea Shoulder flex/abd/horiz abd/add, elbow ext/flex, scapular pro/retract. No assist required. PROM/Stretching:      L UE All Planes x 10 reps ea Completed while seated upright. Neuro Re-Ed:      Weight Bearing for Proprioceptive Input x 15 min Lying on L side to WB into shoulder, then onto elbow, then seated upright with L hand flat on mat and elbow blocked with max A. -WBing elbow into M green resistance putty x 15 reps. Then stand and WB full arm with flat hand in putty x 20 reps. Elbow blocker utilized. Cuing for posture and positioning required (drive shoulder down, do not bend over). Extended time required for breaks d/t fatigue. Digit/Wrist Extension x 10 reps ea Attempting various neuro techniques such as tapping to facilitate extensors during active movement. Therapeutic Activity:                  Strengthening:      Putty Strengthening  10 min Provided with yellow soft resistance putty for HEP. Completed gripping and pulling apart, min A required for digit extension. Resistive Strengthening  15 reps ea Elbow flex/ext against therapist.  Added digit flexion into full fist.   Other:      Soft tissue  x 10 min To L shoulder and neck to reduce tightness. Sling   Readjusted new splint for proper positioning. Re-educated on wear and care. Splint Fabrication Continues w/ wear 15 min Fabricated pan splint to place digits in full extension to tighten extensors and reduce flaccidity. Will continue to make adjustments as needed. Positioning Education Continues incorporating 10 min Completed in regards to sleeping at night. KT x 10 min Completed taping for repositioning of glenohumeral head d/t shoulder subluxation.  Pt reported instant pain relief. Also completed extension facilitation to all digits and wrist this date with good results to improve functional use of the hand. Assessment/Comments: Pt is making Fair progress toward stated plan of care. Active extension observed of wrist and digits today with NMES, started at lower intensity d/t moderate tolerance. Pt thrilled to see active wrist extension this date. Plans are to increase intensity for better results as pt builds tolerance. Pt able to close into active fist today with active extension of digits to 1/2 of straight hand following [address] activities. Increased pain in shoulder this date, difficulty with PROM over 70*. Reviewed HEP and discussed importance of compliance and how active movement/ex's can help to reduce his shoulder pain. Pt continues to benefit from tape, will continue each session.    -Rehab Potential: Good  -Requires OT Follow Up: Yes              Time In: 2:00 pm            Time Out: 3:10 pm     Treatment Charges: Mins Units   Modalities 15/10 1/1   Ther Exercise 10 1   Manual Therapy 15 1   Thera Activities     ADL/Home Mgt      Neuro Re-education 20 1   Gait Training     Group Therapy     Non-Billable Service Time     Other     Total Time/Units 70 5       -Response to Treatment: Pt tolerated treatment session well today. Continues to find pain relief by end of session. Goal Progress: Goals for pt can be see on initial eval occurring on 7/13/2020. Pt. Education:  [x] Yes  [] No  [] Reviewed Prior HEP/Ed  Method of Education: [x] Verbal  [x] Demo  [] Written  Comprehension of Education:  [x] Verbalizes understanding. [x] Demonstrates understanding. [x] Needs review. [] Demonstrates/verbalizes HEP/Ed previously given. Plan:   [x]  Continues Plan of care: Treatment covered based on POC and graduated to patient's progress. Pt education continues at each visit to obtain maximum benefits from skilled OT intervention.   []  Alter Plan of care: []  Discharge:    Camelia P[address], OTR/L #[id number]

## 2020-09-22 NOTE — TELEPHONE ENCOUNTER
Vijay denied - do you want to prescribe another medication?     Electronically signed by Jourdan Arceo MA on 9/22/20 at 12:08 PM EDT

## 2020-09-23 ENCOUNTER — HOSPITAL ENCOUNTER (OUTPATIENT)
Dept: OCCUPATIONAL THERAPY | Age: 55
Setting detail: THERAPIES SERIES
Discharge: HOME | End: 2020-09-23
Payer: MEDICAID

## 2020-09-23 PROCEDURE — 97112 NEUROMUSCULAR REEDUCATION: CPT | Performed by: OCCUPATIONAL THERAPY ASSISTANT

## 2020-09-23 NOTE — PROGRESS NOTES
OCCUPATIONAL THERAPY PROGRESS NOTE    Date:  2020    Initial Evaluation Date: 2020                Evaluating Therapist: Ko Goyal     Patient Name:  Angel Bound                      :  1965     Restrictions/Precautions:  Sling for flaccid L UE, low fall risk  Diagnosis:  I63.9 (ICD-10-CM) - Acute CVA (cerebrovascular accident) ([address] (L-sided hemiplegia)                  Insurance/Certification information:  Zuleima Willingham  Referring Practitioner:  Dr. Shruti Lyn MD  Date of Surgery/Injury: 2020  Plan of care signed (Y/N):  N  Visit# / total visits: 15/ 18    Pain Level: 7/10 L shoulder   Subjective: Pt stated \"I was able to plug in an extension cord with my L hand. \"     OBJECTIVE: InMotion ex's         RI   DFT  RP  MJ  DFSL  Protocol Comments   # 80 0   6 19 96 3 AA Random     # 2/160    0 6 26 96 3 AA Random  Focus on  DFT and MJ   # 3/240    0 7 14 97 3 AA Random  Focus on  RP  and MJ   # 4/320    0 6 15 98 3  AA Random  Focus on  DFT & MJ   #5/400     0 7 7 96 3 AA Focus on RP and MJ   #6/480    0 6 25 95 3  AA    #7/560    0 6 8 96 3  Error Aug 2x Focus on  MJ & DFT   #8/640 0   5 20 97 3 Error Aug 2x Focus on  MJ and DFT                              Stabilization 100             160 Seconds   Resistance 50              160 reps                                          Pt . Performed a total of 864 movements including pre and post testing, AA, AA Random and Error Aug 2x  plus 160 secs of stabilization 100. Pt. Demonstrated improvements from pre to post testing in motion smoothness from 0.580 to 0.589, max velocity from 0.132 to 0.167 and initiation time from 0.049 to 0.032 today. All InMotion ex's are performed to increase function of LUE to  assist with ADL's and IADL's at home. Pt does appreciate the immediate feedback that is provided using InMotion arm.  Pt is highly motivated & continues to engage in ADLs & IADLs in the home.     Pt. kinesiotaped to decrease shoulder pain.     Assessment/Comments: Pt is making Good progress toward stated plan of care. Pt remains highly motivated. Will continue to progress as tolerated. Pain continues to be a limiting factor.      -Rehab Potential: Good     -Requires OT Follow Up: Yes  Time In: 1400       Time Out: 1500     Treatment Charges: Mins Units   Modalities       Ther Exercise       Manual Therapy       Thera Activities       ADL/Home Mgt        Neuro Re-education 60 4   Group Therapy       Non-Billable Service Time       Other       Total Time/Units 60 Min  4      -Response to Treatment: Good  Plan:   x  Continues Plan of care: Pt education continues at each visit to obtain maximum benefits from skilled OT intervention. Will focus on [address] and strengthening with robotic arm therapy. ?  Alter Plan of care:   ?Nicole Valenzuel[address] [place]/L [id number]       I have read & agree with the above status.     Kaelyn Aguilar OTR/L [id number]

## 2020-09-28 ENCOUNTER — HOSPITAL ENCOUNTER (OUTPATIENT)
Dept: OCCUPATIONAL THERAPY | Age: 55
Setting detail: THERAPIES SERIES
Discharge: HOME | End: 2020-09-28
Payer: MEDICAID

## 2020-09-28 PROCEDURE — 97112 NEUROMUSCULAR REEDUCATION: CPT | Performed by: OCCUPATIONAL THERAPIST

## 2020-09-28 PROCEDURE — 97022 WHIRLPOOL THERAPY: CPT | Performed by: OCCUPATIONAL THERAPIST

## 2020-09-28 PROCEDURE — 97032 APPL MODALITY 1+ESTIM EA 15: CPT | Performed by: OCCUPATIONAL THERAPIST

## 2020-09-28 PROCEDURE — 97140 MANUAL THERAPY 1/> REGIONS: CPT | Performed by: OCCUPATIONAL THERAPIST

## 2020-09-28 PROCEDURE — 97110 THERAPEUTIC EXERCISES: CPT | Performed by: OCCUPATIONAL THERAPIST

## 2020-09-28 NOTE — PROGRESS NOTES
OCCUPATIONAL THERAPY PROGRESS NOTE    Date:  2020  Initial Evaluation Date: 2020                Evaluating Therapist: Shane Cabello     Patient Name:  Minor Castaneda                      :  1965     Restrictions/Precautions:  Sling for flaccid L UE, low fall risk  Diagnosis:  I63.9 (ICD-10-CM) - Acute CVA (cerebrovascular accident) ([address] (L-sided hemiplegia)                  Insurance/Certification information:  [employer]  Referring Practitioner:  Dr. Saray Coreas MD  Date of Surgery/Injury: 2020  Plan of care signed (Y/N):  Y  Visit# / total visits:     Pain Level: ^6-7 on scale of 1-10 in distal L UE and back, aching, needle-like, sharp, uncomfortable and variable intensity    Subjective: Pt reports \"I feel like my shoulder is getting worse. \" When questioned about completing shoulder exercises for HEP, pt admitted to not completing any shoulder exercises shown and completed previously. Had extensive discussion on importance of gentle AROM to relieve pain in shoulder. Objective:  Updated POC to be completed by 10th session. INTERVENTION: COMPLETED: REPS/TIME: SPECIFICS/COMMENTS:   Modality:      NMES x 10 min 10 sec on 10 sec off at level ^16 intensity. Close hand into fist when off, extend wrist/digits when on. Active extension observed. Will increase intensity as pt builds tolerance. -Used with beans, grasp and release. Fluidotherapy  x 15 min To loosen soft tissue and reduce tightness/pain. Actively completed SROM in all available planes. MHP x 15 min Applied to L shoulder to reduce pain while in fluidotherapy. AROM/AAROM:      AAROM R UE All Planes x 25 reps ea Complete scapular retraction/protraction, shoulder flex/ext, horizontal abduction/adduction, elbow flex/ext, wrist flex/ext with assist of unaffected UE. Added to HEP. -Completed with hand after applying KT for extensor facilitation.    Fist pumps x 15 reps Full fist into quick release, active extension noted this date. Lift and Place  10 [id number] reps ea Pt to lift into shoulder flexion and abduction and hold at 90*, then relax while therapist lowers extremity. Then completed vise versa. Min A to supervision required for the active movements. UBE  10 min 5 forward and 5 backwards with strap to hold L hand onto handle, level 2 resistance. Table Towel Ex's  25 reps ea Shoulder flex/abd/horiz abd/add, elbow ext/flex, scapular pro/retract. No assist required. PROM/Stretching:      L UE All Planes x 15 reps ea Completed while seated upright. Neuro Re-Ed:      Weight Bearing for Proprioceptive Input x 15 min Lying on L side to WB into shoulder, then onto elbow, then seated upright with L hand flat on mat and elbow blocked with max A. -WBing elbow into M green resistance putty x 15 reps. Then stand and WB full arm with flat hand in putty x 20 reps. Elbow blocker utilized. Cuing for posture and positioning required (drive shoulder down, do not bend over). Extended time required for breaks d/t fatigue. Digit/Wrist Extension x 10 reps ea Attempting various neuro techniques such as tapping to facilitate extensors during active movement. Therapeutic Activity:      FM x 15 reps Removing large pegs from board with pinchers with min A for support at elbow. Strengthening:      Putty Strengthening  10 min Provided with yellow soft resistance putty for HEP. Completed gripping and pulling apart, min A required for digit extension. Resistive Strengthening  15 reps ea Elbow flex/ext against therapist.  Added digit flexion into full fist.   Other:      Soft tissue  x 10 min To L shoulder and neck to reduce tightness. To forearm s/p NMES. Sling   Readjusted new splint for proper positioning. Re-educated on wear and care. Splint Fabrication Continues w/ wear 15 min Fabricated pan splint to place digits in full extension to tighten extensors and reduce flaccidity. Will continue to make adjustments as needed. Positioning Education Continues incorporating 10 min Completed in regards to sleeping at night. KT x 10 min Completed taping for repositioning of glenohumeral head d/t shoulder subluxation. Pt reported instant pain relief. Also completed extension facilitation to all digits and wrist this date with good results to improve functional use of the hand. Assessment/Comments: Pt is making Fair progress toward stated plan of care. Tolerance for PROM of shoulder decreasing with abduction. When questioned if completing HEP for shoulder, he said no. Therapist stressed importance of movement to relieve shoulder pain and that he must complete his shoulder ex's daily as educated priorly, he demonstrated understanding. Re-educated pt on diagnosis and shoulder subluxation. Will continue reviewing until pt demonstrates good compliance. His increased pain in shoulder is decreasing his shoulder mobility. Good extension facilitation noted during FES today, actively gripped and released beans. And improved active mobility throughout L UE following weightbearing tasks, as well as provides pain relief. Will completed re-assessment next session and request additional visits.    -Rehab Potential: Good  -Requires OT Follow Up: Yes              Time In: 1:45 pm            Time Out: 3:00 pm     Treatment Charges: Mins Units   Modalities 15/10 1/1   Ther Exercise 15 1   Manual Therapy 15 1   Thera Activities     ADL/Home Mgt      Neuro Re-education 20 1   Gait Training     Group Therapy     Non-Billable Service Time     Other     Total Time/Units 75 5       -Response to Treatment: Pt tolerated treatment session well today. Pain increasing in shoulder, decreasing throughout rest of distal UE. Poor compliance with shoulder home exercises. Goal Progress: Goals for pt can be see on initial eval occurring on 7/13/2020.      Pt. Education:  [x] Yes  [] No  [] Reviewed Prior HEP/Ed  Method of Education: [x] Verbal  [x] Demo  [] Written  Comprehension of Education:  [x] Verbalizes understanding. [x] Demonstrates understanding. [x] Needs review. [] Demonstrates/verbalizes HEP/Ed previously given. Plan:   [x]  Continues Plan of care: Treatment covered based on POC and graduated to patient's progress. Pt education continues at each visit to obtain maximum benefits from skilled OT intervention.   []  Alter Plan of care:   []  Discharge:    Thom P[address], OTR/L #[id number]

## 2020-09-30 ENCOUNTER — HOSPITAL ENCOUNTER (OUTPATIENT)
Dept: OCCUPATIONAL THERAPY | Age: 55
Setting detail: THERAPIES SERIES
Discharge: HOME | End: 2020-09-30
Payer: MEDICAID

## 2020-09-30 PROCEDURE — 97112 NEUROMUSCULAR REEDUCATION: CPT | Performed by: OCCUPATIONAL THERAPY ASSISTANT

## 2020-09-30 NOTE — PROGRESS NOTES
that is provided using InMotion arm. Pt is highly motivated & continues to engage in ADLs & IADLs in the home.       Assessment/Comments: Pt is making Good progress toward stated plan of care. Pt remains highly motivated. Will continue to progress as tolerated. Pain and fatigue continues to be a limiting factor.      -Rehab Potential: Good     -Requires OT Follow Up: Yes  Time In: 1400       Time Out: 1500     Treatment Charges: Mins Units   Modalities       Ther Exercise       Manual Therapy       Thera Activities       ADL/Home Mgt        Neuro Re-education 60 4   Group Therapy       Non-Billable Service Time       Other       Total Time/Units 60 Min  4      -Response to Treatment: Good  Plan:   x  Continues Plan of care: Pt education continues at each visit to obtain maximum benefits from skilled OT intervention. Will focus on [address] and strengthening with robotic arm therapy. ?  Alter Plan of care:   ?Analia Garcia CASTILLO/L [id number]       I have read & agree with the above status.     Edie Matson OTR/L [id number]

## 2020-10-05 ENCOUNTER — HOSPITAL ENCOUNTER (OUTPATIENT)
Dept: OCCUPATIONAL THERAPY | Age: 55
Setting detail: THERAPIES SERIES
Discharge: HOME | End: 2020-10-05
Payer: MEDICAID

## 2020-10-05 PROCEDURE — 97022 WHIRLPOOL THERAPY: CPT | Performed by: OCCUPATIONAL THERAPIST

## 2020-10-05 PROCEDURE — 97032 APPL MODALITY 1+ESTIM EA 15: CPT | Performed by: OCCUPATIONAL THERAPIST

## 2020-10-05 PROCEDURE — 97110 THERAPEUTIC EXERCISES: CPT | Performed by: OCCUPATIONAL THERAPIST

## 2020-10-05 PROCEDURE — 97140 MANUAL THERAPY 1/> REGIONS: CPT | Performed by: OCCUPATIONAL THERAPIST

## 2020-10-05 PROCEDURE — 97530 THERAPEUTIC ACTIVITIES: CPT | Performed by: OCCUPATIONAL THERAPIST

## 2020-10-05 NOTE — PROGRESS NOTES
OCCUPATIONAL THERAPY PROGRESS NOTE  RE-ASSESSMENT - Please co-sign for additional visits! Date:  10/5/2020  Initial Evaluation Date: 2020                Evaluating Therapist: Lucy Apgar     Patient Name:  Desiree Molina                      :  1965     Restrictions/Precautions:  Sling for flaccid L UE, low fall risk  Diagnosis:  I63.9 (ICD-10-CM) - Acute CVA (cerebrovascular accident) ([address] (L-sided hemiplegia)                  Insurance/Certification information:  [employer] (Authorized 30 visits)   Referring Practitioner:  Dr. Zuleima Carbajal MD  Date of Surgery/Injury: 2020  Plan of care signed (Y/N):  Y  Visit# / total visits:  - Requesting approval for additional visits    Pain Level: ^6-7 on scale of 1-10 in distal L UE and back, aching, needle-like, sharp, uncomfortable and variable intensity    Subjective: Pt reports \"I'm really trying to get myself to use my hand more, I held a jar of PB with my L as I scooped the PB out with my R.\" Continues to report less/poor compliance to HEP. Objective:  Updated POC to be completed by 10th session. INTERVENTION: COMPLETED: REPS/TIME: SPECIFICS/COMMENTS:   Modality:      NMES  10 min 10 sec on 10 sec off at level ^16 intensity. Close hand into fist when off, extend wrist/digits when on. Active extension observed. Will increase intensity as pt builds tolerance. -Used with beans, grasp and release. TENS x 15 min At level 12 intensity to reduce pain in shoulder. Completed hand AROM during. Fluidotherapy  x 15 min To loosen soft tissue and reduce tightness/pain. Actively completed SROM in all available planes. MHP x 15 min Applied to L shoulder to reduce pain while in fluidotherapy. AROM/AAROM:      AAROM R UE All Planes x 25 reps ea Complete scapular retraction/protraction, shoulder flex/ext, horizontal abduction/adduction, elbow flex/ext, wrist flex/ext with assist of unaffected UE. Added to HEP.    -Completed with hand after applying KT for extensor facilitation. Digit AROM/AAROM x 15 reps ea Hand fanning with active assist and isolated radial abduction/adduction with active assist.    Fist pumps x 15 reps Full fist into quick release, active extension noted this date. Assist to finish actively extending digits. Lift and Place  10 [id number] reps ea Pt to lift into shoulder flexion and abduction and hold at 90*, then relax while therapist lowers extremity. Then completed vise versa. Min A to supervision required for the active movements. UBE  10 min 5 forward and 5 backwards with strap to hold L hand onto handle, level 2 resistance. Table Towel Ex's  25 reps ea Shoulder flex/abd/horiz abd/add, elbow ext/flex, scapular pro/retract. No assist required. PROM/Stretching:      L UE All Planes x 15 reps ea Completed while seated upright. Neuro Re-Ed:      Weight Bearing for Proprioceptive Input  15 min Lying on L side to WB into shoulder, then onto elbow, then seated upright with L hand flat on mat and elbow blocked with max A. -WBing elbow into M green resistance putty x 15 reps. Then stand and WB full arm with flat hand in putty x 20 reps. Elbow blocker utilized. Cuing for posture and positioning required (drive shoulder down, do not bend over). Extended time required for breaks d/t fatigue. Digit/Wrist Extension x 10 reps ea Attempting various neuro techniques such as tapping to facilitate extensors during active movement. Therapeutic Activity:      FM x 15 reps Removing large pegs from board with pinchers with min A for support at elbow. Strengthening:      Putty Strengthening  10 min Provided with yellow soft resistance putty for HEP. Completed gripping and pulling apart, min A required for digit extension. Resistive Strengthening  15 reps ea Elbow flex/ext against therapist.  Added digit flexion into full fist.   Other:      Re-Assessmnet x 15 min In-depth re-assessment completed, requesting additional visits. Soft tissue  x 5 min To L shoulder and neck to reduce tightness. Sling   Readjusted new splint for proper positioning. Re-educated on wear and care. Splint Fabrication Continues w/ wear 15 min Fabricated pan splint to place digits in full extension to tighten extensors and reduce flaccidity. Will continue to make adjustments as needed. Positioning Education Continues incorporating 10 min Completed in regards to sleeping at night. KT x 10 min Completed taping for repositioning of glenohumeral head d/t shoulder subluxation. Pt reported instant pain relief. Also completed extension facilitation to all digits and wrist this date with good results to improve functional use of the hand. Assessment/Comments: Pt is making Fair progress toward stated plan of care. Tolerance for PROM of shoulder decreasing with abduction. Re-assessment completed and measurements are are shown below in bold. Pt's pain increasing in shoulder. He admits less and less compliance with HEP, specifically the shoulder exercises. Pt reports \"I think I feel I'm getting defeated and I have less motivation to do my exercises anymore. \" Had in-depth discussion about diagnosis and recovery time, as well as the extensive rehab it takes. Discussed all gains below pt has already made, which had helped to re-adjust his motivation. Despite efforts to reduce pain in shoulder, it continues to worsen. Suggested notifying his referring physician. He has gained active movement in that L shoulder but PROM tolerance continues to decrease as pain has been increasing. Pt with decreased compliance to splint wear d/t difficulty getting on and off. He is reporting pain relief in hand and better movement distally of arm. Pt is now demonstrating active movement throughout the L UE, but is still severely limited and will require continuation of skilled OT services to continue the pt's progress toward his unmet goals. Therapist requesting additional visits. Re-Assessment 10/5/2020:  Manual Muscle Testing (MMT)  []? Right UE     [x]? Left UE       []? Both UE's    Grade (0/5-5/5) 10/5/2020   Trapezius Trace 3-/5   Scap Protrat/Retract Trace (Slight winging of scapula w/ anterior tilt) 3-/5   Shoulder Flex Trace 2+/5   Shoulder Abd Trace 2+/5   Int/Ext Rotation Trace Internally 3-/5 ext, 3+/5 int   Elbow Flex/Ext Trace 2+/5   Wrist Flex/Ext 0/5 2+/5   Finger Abd/Add 0/5 2+/5     R MMT 5/5 all areas. L Upper Extremity ROM /  KEY: Ext/Flex     AROM(PROM)        AROM [x]     PROM( ) 10/5/2020     Shoulder Extension: 0/45* 0/40*      Flexion: 0/180* 0/35*(100*)      Abduction: 0/180* 0/35* (50* ^'d pain)      Internal Rotation: 0/70* WFLs      External Rotation: 0/90* 0/24*       Elbow Extension/Flexion: 0/145* -20*/115*       Forearm Pronation: 0/76-84* WFLs      Supination: 0/80* Neutral (norm)       Wrist Flexion: 0/60-80* 0/35*      Extension: 0/60-75* 0/30*      Radial Deviation: 0/20-25* 0/10*      Ulnar Deviation: 0/30-45* 0/0*       Sensation: Pt reports he gets numbness in hand. ---> Continues with mild numbness in hand, but only occasional.     Dynamometer (setting 2):                              Left: Unable  To 12#                                        Right: 85# in wrist  To 93#                    Pinch Meter:              Lateral: Left= Unable  To 4#, Right= 19#  To 20#              Palmar 3 point: Left= Unable  To SAME , Right= 15#  9 Hole Peg Test:              Left: Unable ---> Same, however pt was able to  peg today with min assist for positioning of wrist.              Right: 21 sec      QuickDASH Score: 68.2% disability    -Rehab Potential: Good  -Requires OT Follow Up:  Yes              Time In: 1:45 pm            Time Out: 3:00 pm     Treatment Charges: Mins Units   Modalities 15/15 1/1   Ther Exercise 20 1   Manual Therapy 15 1   Thera Activities 15 1   ADL/Home Mgt      Neuro Re-education     Gait Training     Group Therapy Non-Billable Service Time     Other     Total Time/Units 75 5     -Response to Treatment: Pt tolerated treatment session well today. Pain increasing in shoulder, no improvement after completing multiple pain management techniques. Poor compliance to HEP, decreased motivation. GOALS (Long term same as Short term):  1) Patient will demonstrate good understanding of home program (exercises/activities/diagnosis/prognosis/goals) with good accuracy. Progressing slowly, pt demonstrating poor compliance with HEP. He was educated on simple AROM exercises to maintain mobility and reduce pain in the shoulder. It reduced pain in shoulder at start of therapy when he was complying to HEP. However, as therapy continues, pt admits to not completing exercises at home due to lack of motivation. Re-educated on importance off HEP and how it can help to reduce his pain and increase his shoulder mobility. 2) Patient will demonstrate increased active range of motion of their L to [facility] for ADL/IADL completion. Progressing slowly, pt demonstrating active movement throughout the L UE in which he only had contraction with palpation at time of initial evaluation. See above. 3) Patient will demonstrate a /pinch strength of at least 10 / 5 pinch pounds of their L hand. Progressing slowly, was able to assess  strength today as he was unable at time of eval. Unable to do 3-pnt pinch. 4) Patient to report decreased pain in their affected L upper extremity from 6/10 to 2/10 or less with resistive functional use. Regressing, shoulder pain continues to increase/maintain number. Pt not completing pain management or AAROM ex's at home. ^'d pain at night time. 5) Patient will demonstrate fine motor function by being able to complete 9-hole peg test and/or MRMT by a minute and a half or less.   Progressing slowly, pt was able to  a peg today with support at wrist but could not complete test.  6) Patient to report 100% compliance with their splint wear, care, and precautions if needed. Progressing slowly, pt was wearing sling consistently up until the past week. He has began just placing hand in pocket because he gets frustrated with sling don/doff. Asked pt to bring sling in. 7) Patient to demonstrate decreased guarding of their affected extremity from 100% to 50% or less. Progressing slowly, at about 75-85% has been encouraging himself more to sure his L  for support but still uses R arm for placement and control of the L UE. 8) Patient will report ADL functions as Mod I/I using bilateral UE's and adaptive equipment as needed. Progressing slowly, still requires assist with several tasks. Discussed multiple options for AE. 9) Patient will decrease QuickDASH score by 30% for increased participation in daily functional activities. Progressing slowly. 10) Patient/caregiver to demonstrate proper follow through of home modification/adaptive recommendations to increase safe functional ADLs if needed. Progressing slowly, therapist and pt have been discussing several options for affordable AE. He is to come with a new limitation each session so we may discuss AE or ways to modify. 11) Patient will demonstrate increased strength in the R UE by demonstrating improved MMT grades by a minimum of 2 grades throughout to improve daily functional use. Progressing well; however, current grades still create ^'d difficulty with completing daily functional tasks. Plan:   [x]  Continues Plan of care: Treatment covered based on POC and graduated to patient's progress. Pt education continues at each visit to obtain maximum benefits from skilled OT intervention. By co-signing this document you agree with the recommendation and continuation of occupational therapy services, and approve of the request for additional visits within the pt's allotted # of visits provided by insurance. Thank you!     1-2x a week for 6-8 weeks or 12 additional sessions.   []  Alter Plan of care:   []  Discharge:    Freda Pérez [address], OTR/L #[id number]

## 2020-10-07 ENCOUNTER — HOSPITAL ENCOUNTER (OUTPATIENT)
Dept: OCCUPATIONAL THERAPY | Age: 55
Setting detail: THERAPIES SERIES
Discharge: HOME | End: 2020-10-07
Payer: MEDICAID

## 2020-10-07 PROCEDURE — 97112 NEUROMUSCULAR REEDUCATION: CPT | Performed by: OCCUPATIONAL THERAPY ASSISTANT

## 2020-10-07 NOTE — PROGRESS NOTES
OCCUPATIONAL THERAPY PROGRESS NOTE    Date:  10/7/2020    Initial Evaluation Date: 2020                Evaluating Therapist: Jannet Rivera     Patient Name:  Medina Najera                      :  1965     Restrictions/Precautions:  Sling for flaccid L UE, low fall risk  Diagnosis:  I63.9 (ICD-10-CM) - Acute CVA (cerebrovascular accident) ([facility] [address] (L-sided hemiplegia)                  Insurance/Certification information:  [employer]  Referring Practitioner:  Dr. Nupur Varma MD  Date of Surgery/Injury: 2020  Plan of care signed (Y/N):  N  Visit# / total visits:     Pain Level: 7/10 L shoulder   Subjective: Pt stated \"I used my L hand to help hold the potatoes when I peeled them\"     OBJECTIVE: InMotion ex's         RI   DFT  RP  MJ  DFSL  Protocol Comments   #  1   7 16 96 3 AA Random     # 2/160    0 7 24 96 3 AA Random  Focus on  DFT, RP and MJ   # 3/240    0 7 5 97 3 AA Random  Focus on  RP  and MJ   # 4/320    0 6 18 93 2  AA Random  Focus on  DFT & MJ   #5/400    0 6 16 94 2  Error Aug 2x    #6/480    0 5 27 96 3 Error Aug 2x Focus on DFT and MJ   #7/560             #8/640                                       Stabilization 100 and 150             160 Seconds   Resistance 50              160 reps     Maze ax             67.36   Pong ax        760 level 10     Obstacle course             660 level 15      Pt . Performed a total of 704 movements including pre and post testing, AA Random, Error Aug 2x and resistance 50 plus 160 secs of stabilization 100, pong, maze and obstacle course ax's. Pt. Demonstrated improvements from pre to post testing in motion smoothness from 0.539 to 0.585 max velocity from 0.134 to 0.154, path error from 0.005 to 0.004 and initiation time from 0.046 to 0.031 today. All InMotion ex's are performed to increase function of LUE to  assist with ADL's and IADL's at home. Pt does appreciate the immediate feedback that is provided using InMotion arm.  Pt is highly motivated & continues to engage in ADLs & IADLs in the home.       Assessment/Comments: Pt is making Good progress toward stated plan of care. Pt remains highly motivated. Will continue to progress as tolerated. Pain and fatigue continues to be a limiting factor.      -Rehab Potential: Good     -Requires OT Follow Up: Yes  Time In: 1400       Time Out: 1500     Treatment Charges: Mins Units   Modalities       Ther Exercise       Manual Therapy       Thera Activities       ADL/Home Mgt        Neuro Re-education 60 4   Group Therapy       Non-Billable Service Time       Other       Total Time/Units 60 Min  4      -Response to Treatment: Good  Plan:   x  Continues Plan of care: Pt education continues at each visit to obtain maximum benefits from skilled OT intervention. Will focus on [address] and strengthening with robotic arm therapy. ?  Alter Plan of care:   ?Yogi Garcia CASTILLO/L [id number]     I have read & agree with the above status.     Joan Vazquez OTR/L [id number]

## 2020-10-12 ENCOUNTER — HOSPITAL ENCOUNTER (OUTPATIENT)
Dept: OCCUPATIONAL THERAPY | Age: 55
Setting detail: THERAPIES SERIES
Discharge: HOME | End: 2020-10-12
Payer: MEDICAID

## 2020-10-12 PROCEDURE — 97530 THERAPEUTIC ACTIVITIES: CPT | Performed by: OCCUPATIONAL THERAPIST

## 2020-10-12 PROCEDURE — 97032 APPL MODALITY 1+ESTIM EA 15: CPT | Performed by: OCCUPATIONAL THERAPIST

## 2020-10-12 PROCEDURE — 97110 THERAPEUTIC EXERCISES: CPT | Performed by: OCCUPATIONAL THERAPIST

## 2020-10-12 PROCEDURE — 97022 WHIRLPOOL THERAPY: CPT | Performed by: OCCUPATIONAL THERAPIST

## 2020-10-12 NOTE — PROGRESS NOTES
OCCUPATIONAL THERAPY PROGRESS NOTE      Date:  10/12/2020  Initial Evaluation Date: 2020                Evaluating Therapist: Lisa Denton     Patient Name:  Nisha Leon                      :  1965     Restrictions/Precautions:  Sling for flaccid L UE, low fall risk  Diagnosis:  I63.9 (ICD-10-CM) - Acute CVA (cerebrovascular accident) ([address] (L-sided hemiplegia)                  Insurance/Certification information:  Philip Velasquez (Authorized 30 visits)   Referring Practitioner:  Dr. Vin Duffy MD  Date of Surgery/Injury: 2020  Plan of care signed (Y/N):  Y  Visit# / total visits: -    Pain Level: ^7-8 on scale of 1-10 in distal L UE and back, aching, needle-like, sharp, uncomfortable and variable intensity    Subjective: Pt reports continued increasing pain in R shoulder. He reports so painful he is not motivated to do any exercises at home. This pain is continuing to worsen, therapist suggesting giving physician's office a call. He has an appt coming up on 10/19. Objective:  Updated POC to be completed by 10th session. INTERVENTION: COMPLETED: REPS/TIME: SPECIFICS/COMMENTS:   Modality:      NMES  10 min 10 sec on 10 sec off at level ^16 intensity. Close hand into fist when off, extend wrist/digits when on. Active extension observed. Will increase intensity as pt builds tolerance. -Used with beans, grasp and release. TENS x 15 min At level 12 intensity to reduce pain in shoulder. Completed hand AROM during. Fluidotherapy  x 15 min To loosen soft tissue and reduce tightness/pain. Actively completed SROM in all available planes. MHP x 15 min Applied to L shoulder to reduce pain while in fluidotherapy. AROM/AAROM:      AAROM R UE All Planes x 25 reps ea Complete scapular retraction/protraction, shoulder flex/ext, horizontal abduction/adduction, elbow flex/ext, wrist flex/ext with assist of unaffected UE. Added to HEP.    -Completed with hand after applying KT for extensor facilitation. Digit AROM/AAROM x 15 reps ea Hand fanning with active assist and isolated radial abduction/adduction with active assist.   -Added isolated Radial/bower/ abd/add of thumb. Fist pumps x 15 reps Full fist into quick release, active extension noted this date. Assist to finish actively extending digits. Scapular Retract/Protract & Trap Shrugs x 20 reps ea Improved movement noted. Lift and Place  10 [id number] reps ea Pt to lift into shoulder flexion and abduction and hold at 90*, then relax while therapist lowers extremity. Then completed vise versa. Min A to supervision required for the active movements. UBE  10 min 5 forward and 5 backwards with strap to hold L hand onto handle, level 2 resistance. Table Towel Ex's x 25 reps ea Shoulder flex/abd/horiz abd/add, elbow ext/flex, scapular pro/retract. No assist required. PROM/Stretching:      L UE All Planes x 5 reps ea Completed while seated upright. Not able to tolerate anything further than 30* with shoulder flexion and abduction. Neuro Re-Ed:      Weight Bearing for Proprioceptive Input  15 min Lying on L side to WB into shoulder, then onto elbow, then seated upright with L hand flat on mat and elbow blocked with max A. -WBing elbow into M green resistance putty x 15 reps. Then stand and WB full arm with flat hand in putty x 20 reps. Elbow blocker utilized. Cuing for posture and positioning required (drive shoulder down, do not bend over). Extended time required for breaks d/t fatigue. Digit/Wrist Extension x 10 reps ea Attempting various neuro techniques such as tapping to facilitate extensors during active movement. Therapeutic Activity:      FM x 10 min Removing large pegs from board with pinchers with min A for support at elbow.  -Placing large pegs into board with min A to mod A and multiple verbal cues. Strengthenin# Elbow Flex/Ext x 2x15 reps Elbow supported on table.    Hand Gripper x 2x15 reps At level 1 resistance using black spring. Putty Strengthening  10 min Provided with yellow soft resistance putty for HEP. Completed gripping and pulling apart, min A required for digit extension. Resistive Strengthening  15 reps ea Elbow flex/ext against therapist.  Added digit flexion into full fist.   Other:      Re-Assessmnet  15 min In-depth re-assessment completed, requesting additional visits. Soft tissue  x 5 min To L shoulder and neck to reduce tightness. Sling   Readjusted new splint for proper positioning. Re-educated on wear and care. Splint Fabrication Continues w/ wear  Fabricated pan splint to place digits in full extension to tighten extensors and reduce flaccidity. Will continue to make adjustments as needed. Positioning Education Continues incorporating  Completed in regards to sleeping at night. KT x 10 min Completed taping for repositioning of glenohumeral head d/t shoulder subluxation. Pt reported instant pain relief. Also completed extension facilitation to all digits and wrist this date with good results to improve functional use of the hand. Assessment/Comments: Pt is making Fair progress toward stated plan of care. Tolerance for PROM continues to decrease with gentle shoulder flexion and abduction, therapist was unable to move pt further than 30* both ways without increased pain. A couple weeks ago, Therapist was able to complete PROM to at least 100* comfortably. Pain with slight internal rotation. Pt finding some pain relief from taping and TENS, but only lasting temporarily. He admits to not completing any home exercises because of the pain. Therapist suggested pt call physician last week and he had not, he reported he will call today after our session. Since PROM was painful, he completed more table towel slides in which he was able to tolerate better. No pain with scapular retraction/protraction and trap contraction.  Fist strength improving but extension in the hand remains flaccid. Pt is demonstrating improved extension of only the thumb. Pt fatigues easily mentally and physically by end of session. Re-Assessment 10/5/2020:  Manual Muscle Testing (MMT)  []? Right UE     [x]? Left UE       []? Both UE's    Grade (0/5-5/5) 10/5/2020   Trapezius Trace 3-/5   Scap Protrat/Retract Trace (Slight winging of scapula w/ anterior tilt) 3-/5   Shoulder Flex Trace 2+/5   Shoulder Abd Trace 2+/5   Int/Ext Rotation Trace Internally 3-/5 ext, 3+/5 int   Elbow Flex/Ext Trace 2+/5   Wrist Flex/Ext 0/5 2+/5   Finger Abd/Add 0/5 2+/5     R MMT 5/5 all areas. L Upper Extremity ROM /  KEY: Ext/Flex     AROM(PROM)        AROM [x]     PROM( ) 10/5/2020     Shoulder Extension: 0/45* 0/40*      Flexion: 0/180* 0/35*(100*)      Abduction: 0/180* 0/35* (50* ^'d pain)      Internal Rotation: 0/70* WFLs      External Rotation: 0/90* 0/24*       Elbow Extension/Flexion: 0/145* -20*/115*       Forearm Pronation: 0/76-84* WFLs      Supination: 0/80* Neutral (norm)       Wrist Flexion: 0/60-80* 0/35*      Extension: 0/60-75* 0/30*      Radial Deviation: 0/20-25* 0/10*      Ulnar Deviation: 0/30-45* 0/0*       Sensation: Pt reports he gets numbness in hand. ---> Continues with mild numbness in hand, but only occasional.     Dynamometer (setting 2):                              Left: Unable  To 12#                                        Right: 85# in wrist  To 93#                    Pinch Meter:              Lateral: Left= Unable  To 4#, Right= 19#  To 20#              Palmar 3 point: Left= Unable  To SAME , Right= 15#  9 Hole Peg Test:              Left: Unable ---> Same, however pt was able to  peg today with min assist for positioning of wrist.              Right: 21 sec      QuickDASH Score: 68.2% disability    -Rehab Potential: Good  -Requires OT Follow Up:  Yes              Time In: 1:45 pm            Time Out: 3:00 pm     Treatment Charges: Mins Units   Modalities 15/15 1/1   Ther Exercise 25 2 less.  Progressing slowly, pt was able to  a peg today with support at wrist but could not complete test.  6) Patient to report 100% compliance with their splint wear, care, and precautions if needed. Progressing slowly, pt was wearing sling consistently up until the past week. He has began just placing hand in pocket because he gets frustrated with sling don/doff. Asked pt to bring sling in. 7) Patient to demonstrate decreased guarding of their affected extremity from 100% to 50% or less. Progressing slowly, at about 75-85% has been encouraging himself more to sure his L  for support but still uses R arm for placement and control of the L UE. 8) Patient will report ADL functions as Mod I/I using bilateral UE's and adaptive equipment as needed. Progressing slowly, still requires assist with several tasks. Discussed multiple options for AE. 9) Patient will decrease QuickDASH score by 30% for increased participation in daily functional activities. Progressing slowly. 10) Patient/caregiver to demonstrate proper follow through of home modification/adaptive recommendations to increase safe functional ADLs if needed. Progressing slowly, therapist and pt have been discussing several options for affordable AE. He is to come with a new limitation each session so we may discuss AE or ways to modify. 11) Patient will demonstrate increased strength in the R UE by demonstrating improved MMT grades by a minimum of 2 grades throughout to improve daily functional use. Progressing well; however, current grades still create ^'d difficulty with completing daily functional tasks. Plan:   [x]  Continues Plan of care: Treatment covered based on POC and graduated to patient's progress. Pt education continues at each visit to obtain maximum benefits from skilled OT intervention. Additional 12 visits approved 10/5/2020 by Dr. Sara Lee.    []  Alter Plan of care:   []  Discharge:    Saurabh Carpenter MS, OTR/L #[id number]

## 2020-10-14 ENCOUNTER — APPOINTMENT (OUTPATIENT)
Dept: OCCUPATIONAL THERAPY | Age: 55
End: 2020-10-14
Payer: MEDICAID

## 2020-10-16 ENCOUNTER — HOSPITAL ENCOUNTER (OUTPATIENT)
Dept: GENERAL RADIOLOGY | Age: 55
Discharge: HOME | End: 2020-10-18
Payer: MEDICAID

## 2020-10-16 ENCOUNTER — HOSPITAL ENCOUNTER (OUTPATIENT)
Age: 55
Discharge: HOME | End: 2020-10-18
Payer: MEDICAID

## 2020-10-16 PROCEDURE — 73030 X-RAY EXAM OF SHOULDER: CPT

## 2020-10-19 ENCOUNTER — HOSPITAL ENCOUNTER (OUTPATIENT)
Dept: OCCUPATIONAL THERAPY | Age: 55
Setting detail: THERAPIES SERIES
Discharge: HOME | End: 2020-10-19
Payer: MEDICAID

## 2020-10-19 PROCEDURE — 97530 THERAPEUTIC ACTIVITIES: CPT | Performed by: OCCUPATIONAL THERAPIST

## 2020-10-19 PROCEDURE — 97032 APPL MODALITY 1+ESTIM EA 15: CPT | Performed by: OCCUPATIONAL THERAPIST

## 2020-10-19 PROCEDURE — 97140 MANUAL THERAPY 1/> REGIONS: CPT | Performed by: OCCUPATIONAL THERAPIST

## 2020-10-19 NOTE — PROGRESS NOTES
OCCUPATIONAL THERAPY PROGRESS NOTE      Date:  10/19/2020  Initial Evaluation Date: 2020                Evaluating Therapist: Estrada Alicia     Patient Name:  Andrew Lucio                      :  1965     Restrictions/Precautions:  Sling for flaccid L UE, low fall risk  Diagnosis:  I63.9 (ICD-10-CM) - Acute CVA (cerebrovascular accident) ([address] (L-sided hemiplegia)                  Insurance/Certification information:  [place] (Authorized 30 visits)   Referring Practitioner:  Dr. Saji Kyle MD  Date of Surgery/Injury: 2020  Plan of care signed (Y/N):  Y  Visit# / total visits: -    Pain Level: ^7-8 on scale of 1-10 in distal L UE and back, aching, needle-like, sharp, uncomfortable and variable intensity    Subjective: Pt reports \"I almost didn't come in today my shoulder is hurting so bad. I ended up getting in early to see the Dr, they just took X-rays. \"     Objective:  Updated POC to be completed by 10th session. INTERVENTION: COMPLETED: REPS/TIME: SPECIFICS/COMMENTS:   Modality:      NMES  10 min 10 sec on 10 sec off at level ^16 intensity. Close hand into fist when off, extend wrist/digits when on. Active extension observed. Will increase intensity as pt builds tolerance. -Used with beans, grasp and release. TENS x 20 min At level 13 intensity to reduce pain in shoulder. Completed hand AROM during. Fluidotherapy   15 min To loosen soft tissue and reduce tightness/pain. Actively completed SROM in all available planes. MHP x 10 min Applied to L shoulder to reduce pain while on TENS. AROM/AAROM:      AAROM R UE All Planes x 25 reps ea Complete scapular retraction/protraction, shoulder flex/ext, horizontal abduction/adduction, elbow flex/ext, wrist flex/ext with assist of unaffected UE. Added to HEP. -Completed with hand after applying KT for extensor facilitation.    Digit AROM/AAROM  15 reps ea Hand fanning with active assist and isolated radial abduction/adduction with active assist.   -Added isolated Radial/bower/ abd/add of thumb. Fist pumps  15 reps Full fist into quick release, active extension noted this date. Assist to finish actively extending digits. Scapular Retract/Protract & Trap Shrugs  20 reps ea Improved movement noted. Lift and Place  10 [id number] reps ea Pt to lift into shoulder flexion and abduction and hold at 90*, then relax while therapist lowers extremity. Then completed vise versa. Min A to supervision required for the active movements. UBE  10 min 5 forward and 5 backwards with strap to hold L hand onto handle, level 2 resistance. Table Towel Ex's  25 reps ea Shoulder flex/abd/horiz abd/add, elbow ext/flex, scapular pro/retract. No assist required. PROM/Stretching:      L UE All Planes  5 reps ea Completed while seated upright. Not able to tolerate anything further than 30* with shoulder flexion and abduction. Neuro Re-Ed:      Weight Bearing for Proprioceptive Input  15 min Lying on L side to WB into shoulder, then onto elbow, then seated upright with L hand flat on mat and elbow blocked with max A. -WBing elbow into M green resistance putty x 15 reps. Then stand and WB full arm with flat hand in putty x 20 reps. Elbow blocker utilized. Cuing for posture and positioning required (drive shoulder down, do not bend over). Extended time required for breaks d/t fatigue. Digit/Wrist Extension  10 reps ea Attempting various neuro techniques such as tapping to facilitate extensors during active movement. Therapeutic Activity:      FM  10 min Removing large pegs from board with pinchers with min A for support at elbow.  -Placing large pegs into board with min A to mod A and multiple verbal cues. Strengthenin# Elbow Flex/Ext  2x15 reps Elbow supported on table. Hand Gripper  2x15 reps At level 1 resistance using black spring. Putty Strengthening  10 min Provided with yellow soft resistance putty for HEP.  Completed gripping and pulling apart, min A required for digit extension. Resistive Strengthening  15 reps ea Elbow flex/ext against therapist.  Added digit flexion into full fist.   Other:      Re-Assessmnet  15 min In-depth re-assessment completed, requesting additional visits. Soft tissue  x 10 min To L shoulder and neck to reduce tightness. Sling   Readjusted new splint for proper positioning. Re-educated on wear and care. Splint Fabrication Continues w/ wear  Fabricated pan splint to place digits in full extension to tighten extensors and reduce flaccidity. Will continue to make adjustments as needed. Positioning Education Continues incorporating  Completed in regards to sleeping at night. KT x 10 min Completed taping for repositioning of glenohumeral head d/t shoulder subluxation. Pt reported instant pain relief. Also completed extension facilitation to all digits and wrist this date with good results to improve functional use of the hand. Assessment/Comments: Pt is making Fair progress toward stated plan of care. Completed conservative treatment this date just focusing on reducing pain through soft tissue manipulation, gentle AAROM, TENS, and KT. Pain relief noted by end of session but fatigued and holds very low tolerance for any activity/exercises, hence today's short session. X-Ray's identify subluxation of shoulder. Re-Assessment 10/5/2020:  Manual Muscle Testing (MMT)  []? Right UE     [x]? Left UE       []? Both UE's    Grade (0/5-5/5) 10/5/2020   Trapezius Trace 3-/5   Scap Protrat/Retract Trace (Slight winging of scapula w/ anterior tilt) 3-/5   Shoulder Flex Trace 2+/5   Shoulder Abd Trace 2+/5   Int/Ext Rotation Trace Internally 3-/5 ext, 3+/5 int   Elbow Flex/Ext Trace 2+/5   Wrist Flex/Ext 0/5 2+/5   Finger Abd/Add 0/5 2+/5     R MMT 5/5 all areas.         L Upper Extremity ROM /  KEY: Ext/Flex     AROM(PROM)        AROM [x]     PROM( ) 10/5/2020     Shoulder Extension: 0/45* 0/40*      Flexion: 0/180* shoulder. It reduced pain in shoulder at start of therapy when he was complying to HEP. However, as therapy continues, pt admits to not completing exercises at home due to lack of motivation. Re-educated on importance off HEP and how it can help to reduce his pain and increase his shoulder mobility. 2) Patient will demonstrate increased active range of motion of their L to [facility] for ADL/IADL completion. Progressing slowly, pt demonstrating active movement throughout the L UE in which he only had contraction with palpation at time of initial evaluation. See above. 3) Patient will demonstrate a /pinch strength of at least 10 / 5 pinch pounds of their L hand. Progressing slowly, was able to assess  strength today as he was unable at time of eval. Unable to do 3-pnt pinch. 4) Patient to report decreased pain in their affected L upper extremity from 6/10 to 2/10 or less with resistive functional use. Regressing, shoulder pain continues to increase/maintain number. Pt not completing pain management or AAROM ex's at home. ^'d pain at night time. 5) Patient will demonstrate fine motor function by being able to complete 9-hole peg test and/or MRMT by a minute and a half or less. Progressing slowly, pt was able to  a peg today with support at wrist but could not complete test.  6) Patient to report 100% compliance with their splint wear, care, and precautions if needed. Progressing slowly, pt was wearing sling consistently up until the past week. He has began just placing hand in pocket because he gets frustrated with sling don/doff. Asked pt to bring sling in. 7) Patient to demonstrate decreased guarding of their affected extremity from 100% to 50% or less. Progressing slowly, at about 75-85% has been encouraging himself more to sure his L  for support but still uses R arm for placement and control of the L UE.    8) Patient will report ADL functions as Mod I/I using bilateral UE's and adaptive equipment as needed. Progressing slowly, still requires assist with several tasks. Discussed multiple options for AE. 9) Patient will decrease QuickDASH score by 30% for increased participation in daily functional activities. Progressing slowly. 10) Patient/caregiver to demonstrate proper follow through of home modification/adaptive recommendations to increase safe functional ADLs if needed. Progressing slowly, therapist and pt have been discussing several options for affordable AE. He is to come with a new limitation each session so we may discuss AE or ways to modify. 11) Patient will demonstrate increased strength in the R UE by demonstrating improved MMT grades by a minimum of 2 grades throughout to improve daily functional use. Progressing well; however, current grades still create ^'d difficulty with completing daily functional tasks. Plan:   [x]  Continues Plan of care: Treatment covered based on POC and graduated to patient's progress. Pt education continues at each visit to obtain maximum benefits from skilled OT intervention. Additional 12 visits approved 10/5/2020 by Dr. Bri Diop.    []  Alter Plan of care:   []  Discharge:    Marianela Pérez [address], OTR/L #[id number]

## 2020-10-21 ENCOUNTER — HOSPITAL ENCOUNTER (OUTPATIENT)
Dept: OCCUPATIONAL THERAPY | Age: 55
Setting detail: THERAPIES SERIES
Discharge: HOME | End: 2020-10-21
Payer: MEDICAID

## 2020-10-21 PROCEDURE — 97112 NEUROMUSCULAR REEDUCATION: CPT | Performed by: OCCUPATIONAL THERAPY ASSISTANT

## 2020-10-21 PROCEDURE — 97530 THERAPEUTIC ACTIVITIES: CPT | Performed by: OCCUPATIONAL THERAPY ASSISTANT

## 2020-10-21 NOTE — PROGRESS NOTES
OCCUPATIONAL THERAPY PROGRESS NOTE    Date:  10/21/2020    Initial Evaluation Date: 2020                Evaluating Therapist: Russell Christine     Patient Name:  Siobhan Perry                      :  1965     Restrictions/Precautions:  Sling for flaccid L UE, low fall risk  Diagnosis:  I63.9 (ICD-10-CM) - Acute CVA (cerebrovascular accident) ([address] (L-sided hemiplegia)                  Insurance/Certification information:  [facility]  Referring Practitioner:  Dr. Samm Khan MD  Date of Surgery/Injury: 2020  Plan of care signed (Y/N):  N  Visit# / total visits: -    Pain Level: 9/10 L shoulder   Subjective: Pt stated \"My shoulder has been killing me. I had an xray the other day to see what's going on\"      OBJECTIVE: [employer] ex's         RI   DFT  RP  MJ  DFSL  Protocol Comments   # 1/80          # 2/160             # 3/240             # 4/320             #5/400             #6/480             #7/560             #8/640                                       Stabilization 100 and 150                Resistance 50                  Maze ax                Pong ax            Obstacle course                   Pt. Completed 16 reps of pretest and was unable to do anymore due to pain in his L shoulder. Therapist kinesiotaped patient's L shoulder to decrease pain and subluxation. Also taped between scapulas to improve posture and decrease rolling forward of shoulders. Pt. Also issued a CVA sling to decrease the pain and patient had an easier time donning/doffing CVA sling vs. The surgical sling he was wearing. Patient and therapist decided to place patient on hold until his pain level decreases and was able to participate in th robotic therapy.        Assessment/Comments: Pt is making Good progress toward stated plan of care. Pt remains highly motivated.  Placing patient on hold until pain decreases and patient is able to benefit from the robotic therapy.    -Rehab Potential: Good     -Requires OT Follow Up: Yes  Time In: 1400       Time Out: 1500     Treatment Charges: Mins Units   Modalities       Ther Exercise       Manual Therapy       Thera Activities  30  2   ADL/Home Mgt        Neuro Re-education 15 1   Group Therapy       Non-Billable Service Time       Other       Total Time/Units         -Response to Treatment: Good  Plan:   x  Continues Plan of care: Pt education continues at each visit to obtain maximum benefits from skilled OT intervention. Placing patient on hold for in motion robotic therapy until pain decreases and he is able to reach the max benefits of the program.   ?  Alter Plan of care:   ?Deandra Garcia CASTILLO/L [id number]     I have read & agree with the above status.     Jose Christine OTR/L [id number]

## 2020-10-26 ENCOUNTER — HOSPITAL ENCOUNTER (OUTPATIENT)
Dept: OCCUPATIONAL THERAPY | Age: 55
Setting detail: THERAPIES SERIES
Discharge: HOME | End: 2020-10-26
Payer: MEDICAID

## 2020-10-26 NOTE — PROGRESS NOTES
Occupational Therapy   Cancellation/No-show Note     Patient Name:  Christina Payne  : 1965  Date:  10/26/2020  MRN: [id number]    For today's appointment patient:       Total missed visits including today: 5       Total number of no shows: 1    [x]  Cancelled   [x]  Rescheduled appointment   []  No-show     Reason given by patient:   []  Patient ill   []  Conflicting appointment   []  No transportation   []  Conflict with work   []  No reason given   [x]  Other:     Comments: Increased pain in shoulder, not feeling well. Pt wanted to re-scheduled for Wednesday.      Electronically signed by: Heidy Pérez [address], OTR/L #[id number]

## 2020-10-28 ENCOUNTER — HOSPITAL ENCOUNTER (OUTPATIENT)
Dept: OCCUPATIONAL THERAPY | Age: 55
Setting detail: THERAPIES SERIES
Discharge: HOME | End: 2020-10-28
Payer: MEDICAID

## 2020-10-28 PROCEDURE — 97530 THERAPEUTIC ACTIVITIES: CPT | Performed by: OCCUPATIONAL THERAPIST

## 2020-10-28 PROCEDURE — 97140 MANUAL THERAPY 1/> REGIONS: CPT | Performed by: OCCUPATIONAL THERAPIST

## 2020-10-28 PROCEDURE — 97032 APPL MODALITY 1+ESTIM EA 15: CPT | Performed by: OCCUPATIONAL THERAPIST

## 2020-10-30 ENCOUNTER — NURSE TRIAGE (OUTPATIENT)
Dept: OTHER | Facility: CLINIC | Age: 55
End: 2020-10-30

## 2020-10-30 ENCOUNTER — TELEPHONE (OUTPATIENT)
Dept: ADMINISTRATIVE | Age: 55
End: 2020-10-30

## 2020-10-30 NOTE — TELEPHONE ENCOUNTER
Left message for patient that script has been sent to the pharmacy and to go to ER if pain is worse and call to schedule recheck  Electronically signed by Heaven Pak on 10/30/2020 at 3:01 PM

## 2020-10-30 NOTE — TELEPHONE ENCOUNTER
Per nurse Gayle Ann, patients really needs seen today. The nurse asked me to call the office and if the patient cannot be seen she would like him called. He is post stroke and is having shoulder pain. I talked to [facility] and she will talk to Dr. Eng Listen and will call the patient back. I advised patient.

## 2020-10-30 NOTE — TELEPHONE ENCOUNTER
Patient asked to see Dr Letitia Aburto ASA. Said his shoulder is bothering him. The pain is so bad he cannot sleep. I transferred patient to [facility].

## 2020-10-30 NOTE — TELEPHONE ENCOUNTER
Send in diclofenac gel to apply to his shoulder  Make an appointment for me to see him  If he gets worse before we can get him in, go to ER

## 2020-10-30 NOTE — TELEPHONE ENCOUNTER
Reason for Disposition   Unable to use arm at all and because of shoulder pain or stiffness    Answer Assessment - Initial Assessment Questions  1. ONSET: \"When did the pain start? \"      Onset was 6 weeks ago and is worsening  2. LOCATION: \"Where is the pain located? \"      Left shoulder  3. PAIN: \"How bad is the pain? \" (Scale 1-10; or mild, moderate, severe)    - MILD (1-3): doesn't interfere with normal activities    - MODERATE (4-7): interferes with normal activities (e.g., work or school) or awakens from sleep    - SEVERE (8-10): excruciating pain, unable to do any normal activities, unable to move arm at all due to pain      Current pain level (8/10) - taking Iburpofen  4. WORK OR EXERCISE: \"Has there been any recent work or exercise that involved this part of the body? \"      No  5. CAUSE: \"What do you think is causing the shoulder pain? \"      Thinks it is related to a previous stroke with residual on the left side  6. OTHER SYMPTOMS: \"Do you have any other symptoms? \" (e.g., neck pain, swelling, rash, fever, numbness, weakness)      No  7. PREGNANCY: \"Is there any chance you are pregnant? \" \"When was your last menstrual period? \"      No    Protocols used: SHOULDER PAIN-ADULT-[place]    Received call from [address]. Call soft transferred to [address] to schedule appointment. Attention Provider: Thank you for allowing me to participate in the care of your patient. The  patient was connected to triage in response to information provided to the [facility]. Please do not respond through this encounter as the response is not directed to a shared pool. Patient reports intermittent left shoulder pain and swelling. Patient states that the pain is currently a 7/10 with 800 mg of Ibuprofen on board,  Recommended patient be seen today. Care advice provided. Warm transfer to [address] [place] [address] at the [facility] for physician contact/appointment scheduling.

## 2020-11-02 ENCOUNTER — HOSPITAL ENCOUNTER (OUTPATIENT)
Dept: OCCUPATIONAL THERAPY | Age: 55
Setting detail: THERAPIES SERIES
Discharge: HOME | End: 2020-11-02
Payer: MEDICAID

## 2020-11-02 PROCEDURE — 97032 APPL MODALITY 1+ESTIM EA 15: CPT | Performed by: OCCUPATIONAL THERAPIST

## 2020-11-02 PROCEDURE — 97110 THERAPEUTIC EXERCISES: CPT | Performed by: OCCUPATIONAL THERAPIST

## 2020-11-02 NOTE — PROGRESS NOTES
OCCUPATIONAL THERAPY PROGRESS NOTE      Date:  2020  Initial Evaluation Date: 2020                Evaluating Therapist: Cristóbal Vazquez     Patient Name:  Jonny Curtis                      :  1965     Restrictions/Precautions:  Sling for flaccid L UE, low fall risk  Diagnosis:  I63.9 (ICD-10-CM) - Acute CVA (cerebrovascular accident) ([address] (L-sided hemiplegia)                  Insurance/Certification information:  [place] (Authorized 30 visits)   Referring Practitioner:  Dr. Afua Sanders MD  Date of Surgery/Injury: 2020  Plan of care signed (Y/N):  Y  Visit# / total visits: -    Pain Level: ^7-8 on scale of 1-10 in distal L UE and back, aching, needle-like, sharp, uncomfortable and variable intensity     Subjective: Pt reports \"Shoulder feels about the same, I've noticed the swelling in my hand has been down since we did the Paraffin and it's feeling a little better! I think I've been noticing my hand opening more on it's own. \"      Objective:  Updated POC to be completed by 10th session. INTERVENTION: COMPLETED: REPS/TIME: SPECIFICS/COMMENTS:   Modality:      NMES x 10 min 10 sec on 10 sec off at level ^21 intensity. Close hand into fist when off, extend wrist/digits when on. Active extension observed. Will increase intensity as pt builds tolerance. TENS x 20 min At level 13 intensity to reduce pain in shoulder. Completed hand AROM during. Fluidotherapy   15 min To loosen soft tissue and reduce tightness/pain. Actively completed SROM in all available planes. Paraffin x 10 min Completed to hand x 10 min to reduce pain and loosen soft tissue while completing TENS   MHP x 20 min Applied to L shoulder to reduce pain while on TENS. AROM/AAROM:      AAROM R UE All Planes  25 reps ea Complete scapular retraction/protraction, shoulder flex/ext, horizontal abduction/adduction, elbow flex/ext, wrist flex/ext with assist of unaffected UE. Added to HEP.    -Completed with hand after applying KT for extensor facilitation. Wrist AROM x 15 reps ea Completed gravity eliminated wrist ext/flex with prolonged holds. Digit AROM/AAROM x 15 reps ea Hand fanning with active assist and isolated radial abduction/adduction with active assist.   -Added isolated Radial/bower/ abd/add of thumb.  -Assist ONLY required to keep hand flat on table. Finger Lifts Place and Holds x 10 reps ea Completed with digits 2/5 with hand flat on table. Pt initiating contraction with each digit and able to maintain 1/4th extension of IF off table. Fist pumps x 15 reps Full fist into quick release, active extension noted this date. Assist to finish actively extending digits. Scapular Retract/Protract & Trap Shrugs x 20 reps ea Improved movement noted.  -Added shoulder circles, forward and backward. Lift and Place  10 [id number] reps ea Pt to lift into shoulder flexion and abduction and hold at 90*, then relax while therapist lowers extremity. Then completed vise versa. Min A to supervision required for the active movements. UBE  10 min 5 forward and 5 backwards with strap to hold L hand onto handle, level 2 resistance. Table Towel Ex's  25 reps ea Shoulder flex/abd/horiz abd/add, elbow ext/flex, scapular pro/retract. No assist required. PROM/Stretching:      L UE All Planes  5 reps ea Completed while seated upright. Not able to tolerate anything further than 30* with shoulder flexion and abduction. Neuro Re-Ed:      Weight Bearing for Proprioceptive Input  15 min Lying on L side to WB into shoulder, then onto elbow, then seated upright with L hand flat on mat and elbow blocked with max A. -WBing elbow into M green resistance putty x 15 reps. Then stand and WB full arm with flat hand in putty x 20 reps. Elbow blocker utilized. Cuing for posture and positioning required (drive shoulder down, do not bend over). Extended time required for breaks d/t fatigue.    Digit/Wrist Extension x 10 reps ea Attempting various neuro techniques such as tapping to facilitate extensors during active movement. Therapeutic Activity:      FM  10 min Removing large pegs from board with pinchers with min A for support at elbow.  -Placing large pegs into board with min A to mod A and multiple verbal cues. Strengthenin# Elbow Flex/Ext  2x15 reps Elbow supported on table. Hand Gripper  2x15 reps At level 1 resistance using black spring. Putty Strengthening  10 min Provided with yellow soft resistance putty for HEP. Completed gripping and pulling apart, min A required for digit extension. Resistive Strengthening  15 reps ea Elbow flex/ext against therapist.  Added digit flexion into full fist.   Other:      Re-Assessmnet  15 min In-depth re-assessment completed, requesting additional visits. Soft tissue   10 min To L shoulder and neck to reduce tightness. Sling   Readjusted new splint for proper positioning. Re-educated on wear and care. Splint Fabrication Continues w/ wear  Fabricated pan splint to place digits in full extension to tighten extensors and reduce flaccidity. Will continue to make adjustments as needed. Positioning Education Continues incorporating  Completed in regards to sleeping at night. KT  10 min Completed taping for repositioning of glenohumeral head d/t shoulder subluxation. Pt reported instant pain relief. Also completed extension facilitation to all digits and wrist this date with good results to improve functional use of the hand. Assessment/Comments: Pt is making Fair progress toward stated plan of care. Last session pt demonstrated good MF & RF extension with FES, today his IF and SF demonstrated improvement with extension while maintaining MF and RF. Pt now able to slightly maintain ext hold off table with IF and RF, and contractions felt with MF and SF. This is the first the pt has been able to do so! He is also extending his wrist anywhere from 35-45* independently.  Saw immediate and lasting results with reduction in pain and swelling with paraffin. TENS still helping to manage shoulder pain. Pt extremely fatigued by end of session with primary focus on hand AROM. -Rehab Potential: Good  -Requires OT Follow Up: Yes              Time In: 12:40 pm            Time Out: 1:35 pm     Treatment Charges: Mins Units   Modalities 30 2   Ther Exercise 25 2   Manual Therapy     Thera Activities     ADL/Home Mgt      Neuro Re-education     Gait Training     Group Therapy     Non-Billable Service Time     Other     Total Time/Units 55 4     -Response to Treatment: Extremely fatigues by end of session, pt noticing improved extension in hand. GOALS (Long term same as Short term):  1) Patient will demonstrate good understanding of home program (exercises/activities/diagnosis/prognosis/goals) with good accuracy. Progressing slowly, pt demonstrating poor compliance with HEP. He was educated on simple AROM exercises to maintain mobility and reduce pain in the shoulder. It reduced pain in shoulder at start of therapy when he was complying to HEP. However, as therapy continues, pt admits to not completing exercises at home due to lack of motivation. Re-educated on importance off HEP and how it can help to reduce his pain and increase his shoulder mobility. 2) Patient will demonstrate increased active range of motion of their L to [facility] for ADL/IADL completion. Progressing slowly, pt demonstrating active movement throughout the L UE in which he only had contraction with palpation at time of initial evaluation. See above. 3) Patient will demonstrate a /pinch strength of at least 10 / 5 pinch pounds of their L hand. Progressing slowly, was able to assess  strength today as he was unable at time of eval. Unable to do 3-pnt pinch. 4) Patient to report decreased pain in their affected L upper extremity from 6/10 to 2/10 or less with resistive functional use.    Regressing, shoulder pain continues to increase/maintain number. Pt not completing pain management or AAROM ex's at home. ^'d pain at night time. 5) Patient will demonstrate fine motor function by being able to complete 9-hole peg test and/or MRMT by a minute and a half or less. Progressing slowly, pt was able to  a peg today with support at wrist but could not complete test.  6) Patient to report 100% compliance with their splint wear, care, and precautions if needed. Progressing slowly, pt was wearing sling consistently up until the past week. He has began just placing hand in pocket because he gets frustrated with sling don/doff. Asked pt to bring sling in. 7) Patient to demonstrate decreased guarding of their affected extremity from 100% to 50% or less. Progressing slowly, at about 75-85% has been encouraging himself more to sure his L  for support but still uses R arm for placement and control of the L UE. 8) Patient will report ADL functions as Mod I/I using bilateral UE's and adaptive equipment as needed. Progressing slowly, still requires assist with several tasks. Discussed multiple options for AE. 9) Patient will decrease QuickDASH score by 30% for increased participation in daily functional activities. Progressing slowly. 10) Patient/caregiver to demonstrate proper follow through of home modification/adaptive recommendations to increase safe functional ADLs if needed. Progressing slowly, therapist and pt have been discussing several options for affordable AE. He is to come with a new limitation each session so we may discuss AE or ways to modify. 11) Patient will demonstrate increased strength in the R UE by demonstrating improved MMT grades by a minimum of 2 grades throughout to improve daily functional use. Progressing well; however, current grades still create ^'d difficulty with completing daily functional tasks.      Plan:   [x]  Continues Plan of care: Treatment covered based on POC and graduated to patient's progress. Pt education continues at each visit to obtain maximum benefits from skilled OT intervention. Additional 12 visits approved 10/5/2020 by Dr. Sara Lee.    []  Alter Plan of care:   []  Discharge:    Saurabh Pérez [address], OTR/L #[id number]

## 2020-11-04 ENCOUNTER — HOSPITAL ENCOUNTER (OUTPATIENT)
Dept: OCCUPATIONAL THERAPY | Age: 55
Setting detail: THERAPIES SERIES
Discharge: HOME | End: 2020-11-04
Payer: MEDICAID

## 2020-11-04 PROCEDURE — 97018 PARAFFIN BATH THERAPY: CPT | Performed by: OCCUPATIONAL THERAPIST

## 2020-11-04 PROCEDURE — 97032 APPL MODALITY 1+ESTIM EA 15: CPT | Performed by: OCCUPATIONAL THERAPIST

## 2020-11-04 PROCEDURE — 97110 THERAPEUTIC EXERCISES: CPT | Performed by: OCCUPATIONAL THERAPIST

## 2020-11-04 NOTE — PROGRESS NOTES
OCCUPATIONAL THERAPY PROGRESS NOTE      Date:  2020  Initial Evaluation Date: 2020                Evaluating Therapist: Erik Toscano     Patient Name:  Balaji Persaud                      :  1965     Restrictions/Precautions:  Sling for flaccid L UE, low fall risk  Diagnosis:  I63.9 (ICD-10-CM) - Acute CVA (cerebrovascular accident) ([address] (L-sided hemiplegia)                  Insurance/Certification information:  [place] (Authorized 30 visits)   Referring Practitioner:  Dr. Walker Davila MD  Date of Surgery/Injury: 2020  Plan of care signed (Y/N):  Y  Visit# / total visits: -    Pain Level: ^7-8 on scale of 1-10 in distal L UE and back, aching, needle-like, sharp, uncomfortable and variable intensity     Subjective: Pt reports \"My hand is feeling better but my shoulder and upper arm is staying about the same. I'm so tired today, I don't know how long I can stay for. I'm barely eating anything, I have no appetite. \"      Objective:  Updated POC to be completed by 10th session. INTERVENTION: COMPLETED: REPS/TIME: SPECIFICS/COMMENTS:   Modality:      NMES  10 min 10 sec on 10 sec off at level ^21 intensity. Close hand into fist when off, extend wrist/digits when on. Active extension observed. Will increase intensity as pt builds tolerance. TENS x 20 min At level 13 intensity to reduce pain in shoulder. Completed hand AROM during. Fluidotherapy   15 min To loosen soft tissue and reduce tightness/pain. Actively completed SROM in all available planes. Paraffin x 20 min Completed to hand x 10 min to reduce pain and loosen soft tissue while completing TENS   MHP x 20 min Applied to L shoulder to reduce pain while on TENS. AROM/AAROM:      AAROM R UE All Planes  25 reps ea Complete scapular retraction/protraction, shoulder flex/ext, horizontal abduction/adduction, elbow flex/ext, wrist flex/ext with assist of unaffected UE. Added to HEP.    -Completed with hand after applying KT for extensor facilitation. Wrist AROM x 15 reps ea Completed gravity eliminated wrist ext/flex with prolonged holds. Digit AROM/AAROM x 15 reps ea Hand fanning with active assist and isolated radial abduction/adduction with active assist.   -Added isolated Radial/bower/ abd/add of thumb.  -Assist ONLY required to keep hand flat on table. Finger Lifts Place and Holds x 10 reps ea Completed with digits 2-5 with hand flat on table. Pt initiating contraction with each digit and able to maintain 1/4th extension of IF off table. Fist pumps x 15 reps Full fist into quick release, active extension noted this date. Assist to finish actively extending digits. Scapular Retract/Protract & Trap Shrugs  20 reps ea Improved movement noted.  -Added shoulder circles, forward and backward. Lift and Place  10 [id number] reps ea Pt to lift into shoulder flexion and abduction and hold at 90*, then relax while therapist lowers extremity. Then completed vise versa. Min A to supervision required for the active movements. UBE  10 min 5 forward and 5 backwards with strap to hold L hand onto handle, level 2 resistance. Table Towel Ex's  25 reps ea Shoulder flex/abd/horiz abd/add, elbow ext/flex, scapular pro/retract. No assist required. PROM/Stretching:      L UE All Planes  5 reps ea Completed while seated upright. Not able to tolerate anything further than 30* with shoulder flexion and abduction. Neuro Re-Ed:      Weight Bearing for Proprioceptive Input  15 min Lying on L side to WB into shoulder, then onto elbow, then seated upright with L hand flat on mat and elbow blocked with max A. -WBing elbow into M green resistance putty x 15 reps. Then stand and WB full arm with flat hand in putty x 20 reps. Elbow blocker utilized. Cuing for posture and positioning required (drive shoulder down, do not bend over). Extended time required for breaks d/t fatigue.    Digit/Wrist Extension  10 reps ea Attempting various neuro techniques such as tapping to facilitate extensors during active movement. Therapeutic Activity:      FM  10 min Removing large pegs from board with pinchers with min A for support at elbow.  -Placing large pegs into board with min A to mod A and multiple verbal cues. Strengthenin# Elbow Flex/Ext  2x15 reps Elbow supported on table. Hand Gripper  2x15 reps At level 1 resistance using black spring. Putty Strengthening  10 min Provided with yellow soft resistance putty for HEP. Completed gripping and pulling apart, min A required for digit extension. Resistive Strengthening  15 reps ea Elbow flex/ext against therapist.  Added digit flexion into full fist.   Other:      Re-Assessmnet  15 min In-depth re-assessment completed, requesting additional visits. Soft tissue   10 min To L shoulder and neck to reduce tightness. Sling   Readjusted new splint for proper positioning. Re-educated on wear and care. Splint Fabrication Continues w/ wear  Fabricated pan splint to place digits in full extension to tighten extensors and reduce flaccidity. Will continue to make adjustments as needed. Positioning Education Continues incorporating  Completed in regards to sleeping at night. KT x 5 min Completed taping for repositioning of glenohumeral head d/t shoulder subluxation. Pt reported instant pain relief. Also completed extension facilitation to all digits and wrist this date with good results to improve functional use of the hand. Assessment/Comments: Pt is making Fair progress toward stated plan of care. Activity tolerance declining. Completed TENS/paraffin, taping, and only 15 min of exercises focusing on hand before pt requested to leave early d/t extreme fatigue. 5-10 reps of each exercise exhausts pt. He claims to have no appetite and is not consuming enough to maintain his energy.  Therapist and pt discussed ways to implement meals throughout day and conserve energy, as well as promote ^ energy with physical exercise (going for a short walk outside or around the house). Temporary pain relief in shoulder continues with TENS and taping.     -Rehab Potential: Good  -Requires OT Follow Up: Yes              Time In: 12:50 pm            Time Out: 1:40 pm     Treatment Charges: Mins Units   Modalities 20/10 1/1   Ther Exercise 15 1   Manual Therapy     Thera Activities 5 0   ADL/Home Mgt      Neuro Re-education     Gait Training     Group Therapy     Non-Billable Service Time     Other     Total Time/Units 50 3     -Response to Treatment: Extremely fatigues by end of session, requesting to leave early d//t fatigue. GOALS (Long term same as Short term):  1) Patient will demonstrate good understanding of home program (exercises/activities/diagnosis/prognosis/goals) with good accuracy. Progressing slowly, pt demonstrating poor compliance with HEP. He was educated on simple AROM exercises to maintain mobility and reduce pain in the shoulder. It reduced pain in shoulder at start of therapy when he was complying to HEP. However, as therapy continues, pt admits to not completing exercises at home due to lack of motivation. Re-educated on importance off HEP and how it can help to reduce his pain and increase his shoulder mobility. 2) Patient will demonstrate increased active range of motion of their L to [facility] for ADL/IADL completion. Progressing slowly, pt demonstrating active movement throughout the L UE in which he only had contraction with palpation at time of initial evaluation. See above. 3) Patient will demonstrate a /pinch strength of at least 10 / 5 pinch pounds of their L hand. Progressing slowly, was able to assess  strength today as he was unable at time of eval. Unable to do 3-pnt pinch. 4) Patient to report decreased pain in their affected L upper extremity from 6/10 to 2/10 or less with resistive functional use. Regressing, shoulder pain continues to increase/maintain number.  Pt not completing pain management or AAROM ex's at home. ^'d pain at night time. 5) Patient will demonstrate fine motor function by being able to complete 9-hole peg test and/or MRMT by a minute and a half or less. Progressing slowly, pt was able to  a peg today with support at wrist but could not complete test.  6) Patient to report 100% compliance with their splint wear, care, and precautions if needed. Progressing slowly, pt was wearing sling consistently up until the past week. He has began just placing hand in pocket because he gets frustrated with sling don/doff. Asked pt to bring sling in. 7) Patient to demonstrate decreased guarding of their affected extremity from 100% to 50% or less. Progressing slowly, at about 75-85% has been encouraging himself more to sure his L  for support but still uses R arm for placement and control of the L UE. 8) Patient will report ADL functions as Mod I/I using bilateral UE's and adaptive equipment as needed. Progressing slowly, still requires assist with several tasks. Discussed multiple options for AE. 9) Patient will decrease QuickDASH score by 30% for increased participation in daily functional activities. Progressing slowly. 10) Patient/caregiver to demonstrate proper follow through of home modification/adaptive recommendations to increase safe functional ADLs if needed. Progressing slowly, therapist and pt have been discussing several options for affordable AE. He is to come with a new limitation each session so we may discuss AE or ways to modify. 11) Patient will demonstrate increased strength in the R UE by demonstrating improved MMT grades by a minimum of 2 grades throughout to improve daily functional use. Progressing well; however, current grades still create ^'d difficulty with completing daily functional tasks. Plan:   [x]  Continues Plan of care: Treatment covered based on POC and graduated to patient's progress.  Pt education continues at each visit to obtain maximum benefits from skilled OT intervention. Additional 12 visits approved 10/5/2020 by Dr. Valentina Brooks.    []  Alter Plan of care:   []  Discharge:    Cristóbal Pérez [address], OTR/L #[id number]

## 2020-11-09 ENCOUNTER — APPOINTMENT (OUTPATIENT)
Dept: OCCUPATIONAL THERAPY | Age: 55
End: 2020-11-09
Payer: MEDICAID

## 2020-11-09 ENCOUNTER — HOSPITAL ENCOUNTER (OUTPATIENT)
Dept: OCCUPATIONAL THERAPY | Age: 55
Setting detail: THERAPIES SERIES
Discharge: HOME | End: 2020-11-09
Payer: MEDICAID

## 2020-11-09 NOTE — PROGRESS NOTES
Occupational Therapy   Cancellation/No-show Note     Patient Name:  Rashi Vidal  : 1965  Date:  2020  MRN: [id number]    For today's appointment patient:       Total missed visits including today: 6      Total number of no shows: 1    []  Cancelled   []  Rescheduled appointment   [x]  No-show     Reason given by patient:   []  Patient ill   []  Conflicting appointment   []  No transportation   []  Conflict with work   []  No reason given   [x]  Other    Comments: Pt currently locked out of appt. After further discussion over the phone, pain in shoulder is progressively getting worse, he is unable to tolerate therapy at this time. Pt is wanting to place therapy on hold to save his treatments until he sees physician on Friday. Pt to call and keep therapist updated following physician's appt to determine plans of further intevention. OT placing pt on HOLD at this time.     Electronically signed by: Quin Pérez [address], OTR/L #[id number]

## 2020-11-13 ENCOUNTER — OFFICE VISIT (OUTPATIENT)
Dept: NEUROLOGY | Age: 55
End: 2020-11-13
Payer: MEDICAID

## 2020-11-13 VITALS
HEIGHT: 70 IN | TEMPERATURE: 98.1 F | WEIGHT: 135 LBS | DIASTOLIC BLOOD PRESSURE: 80 MMHG | SYSTOLIC BLOOD PRESSURE: 126 MMHG | HEART RATE: 81 BPM | BODY MASS INDEX: 19.33 KG/M2

## 2020-11-13 PROCEDURE — 3017F COLORECTAL CA SCREEN DOC REV: CPT | Performed by: CLINICAL NURSE SPECIALIST

## 2020-11-13 PROCEDURE — G8484 FLU IMMUNIZE NO ADMIN: HCPCS | Performed by: CLINICAL NURSE SPECIALIST

## 2020-11-13 PROCEDURE — G8427 DOCREV CUR MEDS BY ELIG CLIN: HCPCS | Performed by: CLINICAL NURSE SPECIALIST

## 2020-11-13 PROCEDURE — 99214 OFFICE O/P EST MOD 30 MIN: CPT | Performed by: CLINICAL NURSE SPECIALIST

## 2020-11-13 PROCEDURE — G8420 CALC BMI NORM PARAMETERS: HCPCS | Performed by: CLINICAL NURSE SPECIALIST

## 2020-11-13 PROCEDURE — 1036F TOBACCO NON-USER: CPT | Performed by: CLINICAL NURSE SPECIALIST

## 2020-11-13 RX ORDER — CYCLOBENZAPRINE HCL 10 MG
TABLET ORAL
COMMUNITY
Start: 2020-10-26 | End: 2021-02-12 | Stop reason: ALTCHOICE

## 2020-11-13 NOTE — PROGRESS NOTES
Matt Moreno is a 54 y.o. right handed male     Patient presented to ED with sudden onset of left sided weakness (while driving by the hospital)   Tele-stroke notified and his NIHSS was 6   CTA demonstrated no LVO but revealed a suspected chronic occlusion of his left proximal carotid  CTP demonstrated no mismatch   Echo unrevealing     He received IV tPA and improved initially however several hours afterwards, redeveloped the same left sided weakness     History of tobacco and alcohol abuse     Now maintained on DAPT - this was supposed to be until end of September but he continues   States PCP wanted to continue this regimen   Unable to tolerate statin therefore PCP is started a fish oil    Discussed stroke risk at this time     Vascular did evaluate for his chronic left ICA occlusion   US identified occlusion \"throughout its course\" of the left carotid   Conservative management recommended     Feels left leg is 100% back to baseline   Arm was also improving with therapy but he had to stop d/t severe pains    Asking for referral to Dr Charito Mcmullen     No chest pain or palpitations  No SOB  No vertigo, lightheadedness or loss of consciousness  No incontinence of bowels or bladder  No itching or bruising appreciated  ROS otherwise negative     Allergies as of 11/13/2020    (No Known Allergies)     Objective:     /80 (Site: Right Upper Arm, Position: Sitting, Cuff Size: Medium Adult)   Pulse 81   Temp 98.1 °F (36.7 °C)   Ht 5' 10\" (1.778 m)   Wt 135 lb (61.2 kg)   BMI 19.37 kg/m²      General appearance: alert, appears stated age and cooperative  Head: Normocephalic, without obvious abnormality, atraumatic  Neck: limited ROM  Extremities: no cyanosis or edema - left arm in sling - marked limited passive/active ROM  Pulses: 2+ and symmetric  Skin: no rashes or lesions    Mental Status: Alert, oriented, thought content appropriate    Speech: clear  Language: appropriate    Cranial Nerves:  I: smell    II: visual acuity     II: visual fields Full   II: pupils KOMAL   III,VII: ptosis None   III,IV,VI: extraocular muscles  EOMI without nystagmus    V: mastication Normal   V: facial light touch sensation  Normal   V,VII: corneal reflex  Present   VII: facial muscle function - upper     VII: facial muscle function - lower Normal   VIII: hearing Normal   IX: soft palate elevation  Normal   IX,X: gag reflex Present   XI: trapezius strength  5/5   XI: sternocleidomastoid strength 5/5   XI: neck extension strength  5/5   XII: tongue strength  Normal     Motor:  5/5 throughout right arm and leg  5/5 left IPS, ant tibs and 5/5 left gastroc  4+/5 left bicep and 4/5 left    Normal bulk and tone    Sensory:  Normal to LT     Coordination:   FN, FFM and BONNY decreased left relative to weakness     Gait with cane     No Babinski  No Khan's     Laboratory/Radiology:     CBC with Differential:    Lab Results   Component Value Date    WBC 14.7 08/13/2020    RBC 5.24 08/13/2020    HGB 16.1 08/13/2020    HCT 50.2 08/13/2020     08/13/2020    MCV 95.8 08/13/2020    MCH 30.7 08/13/2020    MCHC 32.1 08/13/2020    RDW 14.0 08/13/2020    LYMPHOPCT 12.3 08/13/2020    MONOPCT 7.0 08/13/2020    BASOPCT 0.3 08/13/2020    MONOSABS 1.02 08/13/2020    LYMPHSABS 1.80 08/13/2020    EOSABS 0.07 08/13/2020    BASOSABS 0.05 08/13/2020     CMP:    Lab Results   Component Value Date     08/13/2020    K 4.9 08/13/2020    K 4.1 06/25/2020     08/13/2020    CO2 25 08/13/2020    BUN 7 08/13/2020    CREATININE 0.8 08/13/2020    GFRAA >60 08/13/2020    LABGLOM >60 08/13/2020    GLUCOSE 96 08/13/2020    PROT 7.6 06/21/2020    LABALBU 4.5 06/21/2020    CALCIUM 10.0 08/13/2020    BILITOT 0.5 06/21/2020    ALKPHOS 80 06/21/2020    AST 16 06/21/2020    ALT 8 06/21/2020     HgBA1c:    Lab Results   Component Value Date    LABA1C 5.3 08/13/2020     FLP:    Lab Results   Component Value Date    TRIG 179 08/13/2020    HDL 37 08/13/2020    [address] 171 08/13/2020    LABVLDL 36 08/13/2020     CT Head:  An evolving nonhemorrhagic late acute infarct in the right basal  ganglia. If further imaging is clinically warranted, consider MRI. Old infarct with encephalomalacia in the left parieto-occipital  region. CTA:  1. There is occlusion of the left common carotid artery at its origin  due to severe atherosclerosis. With persistent obstruction throughout  the length of the internal carotid artery. 2. No other hemodynamically significant stenosis is identified. No  aneurysm is identified. CTP:  There is no CT evidence of a perfusion abnormality. Carotid US  Atherosclerotic disease. No hemodynamically significant stenosis is  identified  Estimated stenosis by NASCET criteria in the proximal right carotid  artery is between 0% and 49%. Estimated stenosis by NASCET criteria in the l left carotid artery is  100%, with complete occlusion by thrombus at the bulb, with less than  a 1 cm \"stump\" of patent lumen. MRI Brain:  1. Subacute infarct involving the right basal ganglia and posterior  limb of the internal capsule. 2. No evidence of intracranial hemorrhage or major mass effect. 3. Small area of encephalomalacia and adjacent gliosis in the left  parieto-occipital region, compatible with a chronic infarct. 4. Nonspecific foci of abnormal signal in the bilateral cerebral white  matter, which may be due to chronic microvascular ischemia, chronic  hypertension, vasculopathy, inflammatory or demyelinating disorder, or  other etiology. 5. Mucous retention cysts and/or mucosal thickening in the left  maxillary, ethmoid, and sphenoid sinuses. Echo with bubble:   Left ventricular internal dimensions were normal in diastole and systole. No regional wall motion abnormalities seen. Normal left ventricular ejection fraction.    No intracardiac shunt via saline contrast injection including valsalva    I personally reviewed the patient's lab and imaging studies at this time.     Assessment:     Right basal ganglionic infarct most likely from    History of alcohol and tobacco abuse (now sober and off tobacco)     Carotid occlusion - left side and does not correlate with acute infarct distribution   Most likely chronic and vascular surgery recommended conservative therapy    I do appreciate a PCOM on that left side which is why he may have been fairly asymptomatic with this occlusion     Left shoulder pains possibly from rotator cuff injury     Plan:     Continue DAPT per PCP    Reiterated from neurology perspective, monotherapy would suffice     Refer to ortho for left shoulder pains (possible rotator cuff injury)     recommend statin   Patient refused d/t tolerability    RTO lindsay Mays  1:24 PM  2020

## 2020-11-16 ENCOUNTER — APPOINTMENT (OUTPATIENT)
Dept: OCCUPATIONAL THERAPY | Age: 55
End: 2020-11-16
Payer: MEDICAID

## 2020-11-18 ENCOUNTER — APPOINTMENT (OUTPATIENT)
Dept: OCCUPATIONAL THERAPY | Age: 55
End: 2020-11-18
Payer: MEDICAID

## 2020-11-23 ENCOUNTER — APPOINTMENT (OUTPATIENT)
Dept: OCCUPATIONAL THERAPY | Age: 55
End: 2020-11-23
Payer: MEDICAID

## 2020-11-30 ENCOUNTER — HOSPITAL ENCOUNTER (OUTPATIENT)
Dept: OCCUPATIONAL THERAPY | Age: 55
Setting detail: THERAPIES SERIES
Discharge: HOME | End: 2020-11-30
Payer: MEDICAID

## 2020-12-07 DIAGNOSIS — Z72.0 TOBACCO ABUSE: ICD-10-CM

## 2020-12-07 DIAGNOSIS — J43.2 CENTRILOBULAR EMPHYSEMA (HCC): ICD-10-CM

## 2020-12-07 DIAGNOSIS — R73.01 IFG (IMPAIRED FASTING GLUCOSE): ICD-10-CM

## 2020-12-07 DIAGNOSIS — E78.2 MIXED HYPERLIPIDEMIA: ICD-10-CM

## 2020-12-07 DIAGNOSIS — Z86.73 H/O: CVA (CEREBROVASCULAR ACCIDENT): ICD-10-CM

## 2020-12-07 DIAGNOSIS — R53.83 FATIGUE, UNSPECIFIED TYPE: ICD-10-CM

## 2021-01-04 ENCOUNTER — HOSPITAL ENCOUNTER (OUTPATIENT)
Dept: MRI IMAGING | Age: 56
Discharge: HOME | End: 2021-01-06
Payer: MEDICAID

## 2021-01-04 DIAGNOSIS — M24.512 CONTRACTURE, LEFT SHOULDER: ICD-10-CM

## 2021-01-04 PROCEDURE — 73221 MRI JOINT UPR EXTREM W/O DYE: CPT

## 2021-01-15 RX ORDER — FOLIC ACID 1 MG/1
TABLET ORAL
Qty: 90 TABLET | Refills: 0 | Status: SHIPPED
Start: 2021-01-15 | End: 2021-04-07

## 2021-01-16 DIAGNOSIS — I63.9 ACUTE CVA (CEREBROVASCULAR ACCIDENT) (HCC): ICD-10-CM

## 2021-01-18 RX ORDER — ASPIRIN 81 MG/1
TABLET, CHEWABLE ORAL
Qty: 90 TABLET | Refills: 0 | Status: SHIPPED | OUTPATIENT
Start: 2021-01-18 | End: 2021-01-22 | Stop reason: SDUPTHER

## 2021-02-04 ENCOUNTER — OFFICE VISIT (OUTPATIENT)
Dept: ORTHOPEDIC SURGERY | Age: 56
End: 2021-02-04
Payer: MEDICAID

## 2021-02-04 VITALS — HEIGHT: 70 IN | BODY MASS INDEX: 19.33 KG/M2 | WEIGHT: 135 LBS | TEMPERATURE: 97 F

## 2021-02-04 DIAGNOSIS — M75.02 ADHESIVE CAPSULITIS OF LEFT SHOULDER: Primary | ICD-10-CM

## 2021-02-04 DIAGNOSIS — M25.512 LEFT SHOULDER PAIN, UNSPECIFIED CHRONICITY: ICD-10-CM

## 2021-02-04 PROCEDURE — G8484 FLU IMMUNIZE NO ADMIN: HCPCS | Performed by: ORTHOPAEDIC SURGERY

## 2021-02-04 PROCEDURE — 3017F COLORECTAL CA SCREEN DOC REV: CPT | Performed by: ORTHOPAEDIC SURGERY

## 2021-02-04 PROCEDURE — G8427 DOCREV CUR MEDS BY ELIG CLIN: HCPCS | Performed by: ORTHOPAEDIC SURGERY

## 2021-02-04 PROCEDURE — 1036F TOBACCO NON-USER: CPT | Performed by: ORTHOPAEDIC SURGERY

## 2021-02-04 PROCEDURE — G8420 CALC BMI NORM PARAMETERS: HCPCS | Performed by: ORTHOPAEDIC SURGERY

## 2021-02-04 PROCEDURE — 99213 OFFICE O/P EST LOW 20 MIN: CPT | Performed by: ORTHOPAEDIC SURGERY

## 2021-02-05 NOTE — PROGRESS NOTES
by mouth three times a day      diclofenac sodium (VOLTAREN) 1 % GEL Apply 2 g topically 4 times daily 1 Tube 5    LOVAZA 1 g capsule Take 2 capsules by mouth 2 times daily 120 capsule 3    clopidogrel (PLAVIX) 75 MG tablet Take 1 tablet by mouth daily 90 tablet 1    ibuprofen (ADVIL;MOTRIN) 800 MG tablet Take 1 tablet by mouth nightly 30 tablet 3     No current facility-administered medications for this visit.         Patient Active Problem List   Diagnosis    Primary osteoarthritis involving multiple joints    Tobacco abuse    Chronic bilateral low back pain with sciatica    Chronic pain of both knees    Closed nondisplaced fracture of phalanx of left thumb with routine healing    Contusion of rib on left side    Centrilobular emphysema ([address]    Lumbar spondylosis    Stroke aborted by administration of thrombolytic agent ([address]    Hyperlipidemia    H/O: CVA (cerebrovascular accident)    Internal carotid artery occlusion, left    Paralysis of left upper extremity (HCC)    Left leg weakness    Stroke-like symptom    Infarction of right basal ganglia (HCC)    Acute CVA (cerebrovascular accident) ([address]       Past Medical History:   Diagnosis Date    H/O: CVA (cerebrovascular accident) 6/22/2020    Infarction of right basal ganglia ([address] 6/24/2020    Internal carotid artery occlusion, left 6/22/2020    Left leg weakness 6/22/2020    Osteoarthritis     Bilateral Knee, Back     Paralysis of left upper extremity ([address] 6/22/2020       Past Surgical History:   Procedure Laterality Date    ANESTHESIA NERVE BLOCK Bilateral 4/11/2019    BILATERAL INTRA-ARTICULAR FACET JOINT INJECTION WITH FLUOROSCOPIC GUIDANCE AT L3-4 AND L4-5 performed by Virginia Moran DO at [address] [facility] Left     Elbow     HERNIA REPAIR      NERVE BLOCK Bilateral 04/11/2019    NERVE BLOCK Left 08/01/2019    lumbar radiofrequency    NERVE BLOCK Right 10/10/2019    lumbar medial branch neurolysis     RADIOFREQUENCY ABLATION NERVES Left 2019    RADIOFREQUENCY ABLATION LEFT L3-4 AND LEFT L4-5 FACET JOINTS THEN RIGHT L3-4 AND L4-5 FACET JOINTS (CPT 15031) performed by Jadiel Allen DO at [address] Right 10/10/2019    RADIOFREQUENCY ABLATION RIGHT L3-4 AND L4-5 FACET JOINTS performed by Jadiel Allen DO at [address] [facility] [place]         No Known Allergies    Social History     Socioeconomic History    Marital status: Single     Spouse name: None    Number of children: None    Years of education: None    Highest education level: None   Occupational History    None   Social Needs    Financial resource strain: None    Food insecurity     Worry: None     Inability: None    Transportation needs     Medical: None     Non-medical: None   Tobacco Use    Smoking status: Former Smoker     Packs/day: 1.00     Types: Cigarettes     Start date: 1983     Quit date: 2020     Years since quittin.2    Smokeless tobacco: Never Used    Tobacco comment: smokes 1-1.5 ppd   Substance and Sexual Activity    Alcohol use: No     Comment: Recovering alcoholic     Drug use: No    Sexual activity: None   Lifestyle    Physical activity     Days per week: None     Minutes per session: None    Stress: None   Relationships    Social connections     Talks on phone: None     Gets together: None     Attends [employer] service: None     Active member of club or organization: None     Attends meetings of clubs or organizations: None     Relationship status: None    Intimate partner violence     Fear of current or ex partner: None     Emotionally abused: None     Physically abused: None     Forced sexual activity: None   Other Topics Concern    None   Social History Narrative    None       Family History   Problem Relation Age of Onset    Stroke Mother     COPD Mother     Diabetes Mother          Review of Systems  As follows except as previously noted in HPI:  Constitutional: Negative for chills, diaphoresis, fatigue, fever and unexpected weight change. Respiratory: Negative for cough, shortness of breath and wheezing. Cardiovascular: Negative for chest pain and palpitations. Neurological: Negative for dizziness, syncope, cephalgia. GI / : negative  Musculoskeletal: see HPI       Objective:   Physical Exam   Constitutional: Oriented to person, place, and time. and appears well-developed and well-nourished. :   Head: Normocephalic and atraumatic. Eyes: EOM are normal.   Neck: Neck supple. Cardiovascular: Normal rate and regular rhythm. Pulmonary/Chest: Effort normal. No stridor. No respiratory distress, no wheezes. Abdominal:  No abnormal distension. Neurological: Alert and oriented to person, place, and time. Skin: Skin is warm and dry. Psychiatric: Normal mood and affect.  Behavior is normal. Thought content normal.    2/5/2021  9:49 AM

## 2021-02-12 ENCOUNTER — OFFICE VISIT (OUTPATIENT)
Dept: FAMILY MEDICINE CLINIC | Age: 56
End: 2021-02-12
Payer: MEDICAID

## 2021-02-12 VITALS
OXYGEN SATURATION: 96 % | RESPIRATION RATE: 18 BRPM | HEIGHT: 70 IN | WEIGHT: 133 LBS | BODY MASS INDEX: 19.04 KG/M2 | DIASTOLIC BLOOD PRESSURE: 74 MMHG | TEMPERATURE: 97.8 F | SYSTOLIC BLOOD PRESSURE: 114 MMHG | HEART RATE: 90 BPM

## 2021-02-12 DIAGNOSIS — Z12.11 SCREEN FOR COLON CANCER: ICD-10-CM

## 2021-02-12 DIAGNOSIS — M75.02 ADHESIVE CAPSULITIS OF LEFT SHOULDER: ICD-10-CM

## 2021-02-12 DIAGNOSIS — Z12.5 SCREENING PSA (PROSTATE SPECIFIC ANTIGEN): ICD-10-CM

## 2021-02-12 DIAGNOSIS — E78.5 DYSLIPIDEMIA: ICD-10-CM

## 2021-02-12 DIAGNOSIS — R73.01 IFG (IMPAIRED FASTING GLUCOSE): ICD-10-CM

## 2021-02-12 DIAGNOSIS — G83.24 MONOPLEGIA OF LEFT UPPER EXTREMITY AFFECTING NONDOMINANT SIDE, UNSPECIFIED ETIOLOGY (HCC): ICD-10-CM

## 2021-02-12 DIAGNOSIS — R79.89 ELEVATED LFTS: ICD-10-CM

## 2021-02-12 DIAGNOSIS — R53.83 FATIGUE, UNSPECIFIED TYPE: ICD-10-CM

## 2021-02-12 DIAGNOSIS — J43.2 CENTRILOBULAR EMPHYSEMA (HCC): ICD-10-CM

## 2021-02-12 DIAGNOSIS — J43.2 CENTRILOBULAR EMPHYSEMA (HCC): Primary | ICD-10-CM

## 2021-02-12 LAB
ALBUMIN SERPL-MCNC: 4.4 G/DL (ref 3.5–5.2)
ALP BLD-CCNC: 86 U/L (ref 40–129)
ALT SERPL-CCNC: <5 U/L (ref 0–40)
ANION GAP SERPL CALCULATED.3IONS-SCNC: 10 MMOL/L (ref 7–16)
AST SERPL-CCNC: 11 U/L (ref 0–39)
BASOPHILS ABSOLUTE: 0.07 E9/L (ref 0–0.2)
BASOPHILS RELATIVE PERCENT: 0.4 % (ref 0–2)
BILIRUB SERPL-MCNC: 0.3 MG/DL (ref 0–1.2)
BUN BLDV-MCNC: 8 MG/DL (ref 6–20)
CALCIUM SERPL-MCNC: 9.4 MG/DL (ref 8.6–10.2)
CHLORIDE BLD-SCNC: 102 MMOL/L (ref 98–107)
CHOLESTEROL, TOTAL: 231 MG/DL (ref 0–199)
CO2: 27 MMOL/L (ref 22–29)
CREAT SERPL-MCNC: 0.8 MG/DL (ref 0.7–1.2)
EOSINOPHILS ABSOLUTE: 0.17 E9/L (ref 0.05–0.5)
EOSINOPHILS RELATIVE PERCENT: 0.9 % (ref 0–6)
GFR AFRICAN AMERICAN: >60
GFR NON-AFRICAN AMERICAN: >60 ML/MIN/1.73
GLUCOSE BLD-MCNC: 99 MG/DL (ref 74–99)
HBA1C MFR BLD: 5.1 % (ref 4–5.6)
HCT VFR BLD CALC: 45.5 % (ref 37–54)
HDLC SERPL-MCNC: 38 MG/DL
HEMOGLOBIN: 15.6 G/DL (ref 12.5–16.5)
IMMATURE GRANULOCYTES #: 0.11 E9/L
IMMATURE GRANULOCYTES %: 0.6 % (ref 0–5)
LDL CHOLESTEROL CALCULATED: 152 MG/DL (ref 0–99)
LYMPHOCYTES ABSOLUTE: 3.25 E9/L (ref 1.5–4)
LYMPHOCYTES RELATIVE PERCENT: 17.8 % (ref 20–42)
MCH RBC QN AUTO: 31.3 PG (ref 26–35)
MCHC RBC AUTO-ENTMCNC: 34.3 % (ref 32–34.5)
MCV RBC AUTO: 91.2 FL (ref 80–99.9)
MONOCYTES ABSOLUTE: 1.24 E9/L (ref 0.1–0.95)
MONOCYTES RELATIVE PERCENT: 6.8 % (ref 2–12)
NEUTROPHILS ABSOLUTE: 13.41 E9/L (ref 1.8–7.3)
NEUTROPHILS RELATIVE PERCENT: 73.5 % (ref 43–80)
PDW BLD-RTO: 13.8 FL (ref 11.5–15)
PLATELET # BLD: 442 E9/L (ref 130–450)
PMV BLD AUTO: 9.3 FL (ref 7–12)
POTASSIUM SERPL-SCNC: 4.3 MMOL/L (ref 3.5–5)
PROSTATE SPECIFIC ANTIGEN: 1.33 NG/ML (ref 0–4)
RBC # BLD: 4.99 E12/L (ref 3.8–5.8)
SODIUM BLD-SCNC: 139 MMOL/L (ref 132–146)
TOTAL PROTEIN: 7.5 G/DL (ref 6.4–8.3)
TRIGL SERPL-MCNC: 205 MG/DL (ref 0–149)
TSH SERPL DL<=0.05 MIU/L-ACNC: 2.91 UIU/ML (ref 0.27–4.2)
URIC ACID, SERUM: 5.4 MG/DL (ref 3.4–7)
VLDLC SERPL CALC-MCNC: 41 MG/DL
WBC # BLD: 18.3 E9/L (ref 4.5–11.5)

## 2021-02-12 PROCEDURE — 3017F COLORECTAL CA SCREEN DOC REV: CPT | Performed by: FAMILY MEDICINE

## 2021-02-12 PROCEDURE — G8420 CALC BMI NORM PARAMETERS: HCPCS | Performed by: FAMILY MEDICINE

## 2021-02-12 PROCEDURE — G8484 FLU IMMUNIZE NO ADMIN: HCPCS | Performed by: FAMILY MEDICINE

## 2021-02-12 PROCEDURE — 99214 OFFICE O/P EST MOD 30 MIN: CPT | Performed by: FAMILY MEDICINE

## 2021-02-12 PROCEDURE — 3023F SPIROM DOC REV: CPT | Performed by: FAMILY MEDICINE

## 2021-02-12 PROCEDURE — G8427 DOCREV CUR MEDS BY ELIG CLIN: HCPCS | Performed by: FAMILY MEDICINE

## 2021-02-12 PROCEDURE — 4004F PT TOBACCO SCREEN RCVD TLK: CPT | Performed by: FAMILY MEDICINE

## 2021-02-12 PROCEDURE — G8926 SPIRO NO PERF OR DOC: HCPCS | Performed by: FAMILY MEDICINE

## 2021-02-12 RX ORDER — ROSUVASTATIN CALCIUM 10 MG/1
10 TABLET, COATED ORAL DAILY
Qty: 90 TABLET | Refills: 1 | Status: SHIPPED
Start: 2021-02-12 | End: 2021-04-12 | Stop reason: SINTOL

## 2021-02-12 RX ORDER — GUAIFENESIN 400 MG/1
400 TABLET ORAL 4 TIMES DAILY PRN
COMMUNITY
End: 2021-02-12 | Stop reason: SDUPTHER

## 2021-02-12 RX ORDER — CHLORAL HYDRATE 500 MG
3000 CAPSULE ORAL 3 TIMES DAILY
COMMUNITY
End: 2021-02-12 | Stop reason: SDUPTHER

## 2021-02-12 RX ORDER — CHLORAL HYDRATE 500 MG
1000 CAPSULE ORAL 3 TIMES DAILY
Qty: 90 CAPSULE | Refills: 5 | Status: SHIPPED
Start: 2021-02-12 | End: 2021-08-25 | Stop reason: SDUPTHER

## 2021-02-12 RX ORDER — GUAIFENESIN 400 MG/1
400 TABLET ORAL 4 TIMES DAILY PRN
Qty: 56 TABLET | Refills: 3 | Status: SHIPPED
Start: 2021-02-12 | End: 2021-04-25

## 2021-02-12 RX ORDER — IBUPROFEN 800 MG/1
800 TABLET ORAL NIGHTLY
Qty: 90 TABLET | Refills: 1 | Status: SHIPPED
Start: 2021-02-12 | End: 2021-08-25 | Stop reason: SDUPTHER

## 2021-02-12 ASSESSMENT — PATIENT HEALTH QUESTIONNAIRE - PHQ9
1. LITTLE INTEREST OR PLEASURE IN DOING THINGS: 0
2. FEELING DOWN, DEPRESSED OR HOPELESS: 0
SUM OF ALL RESPONSES TO PHQ QUESTIONS 1-9: 0
SUM OF ALL RESPONSES TO PHQ QUESTIONS 1-9: 0

## 2021-02-12 ASSESSMENT — ENCOUNTER SYMPTOMS
FACIAL SWELLING: 0
BLURRED VISION: 0
TROUBLE SWALLOWING: 0
EYE ITCHING: 0
EYE DISCHARGE: 0
CHEST TIGHTNESS: 0
GASTROINTESTINAL NEGATIVE: 1
EYE PAIN: 0
CONSTIPATION: 0
BACK PAIN: 0
WHEEZING: 0
EYE REDNESS: 0
APNEA: 0
ANAL BLEEDING: 0
STRIDOR: 0
DIARRHEA: 0
HEMOPTYSIS: 0
ABDOMINAL DISTENTION: 0
SINUS PRESSURE: 0
PHOTOPHOBIA: 0
SHORTNESS OF BREATH: 0
RECTAL PAIN: 0
ORTHOPNEA: 0
RHINORRHEA: 0
COLOR CHANGE: 0
SINUS PAIN: 0
ALLERGIC/IMMUNOLOGIC NEGATIVE: 1
VOICE CHANGE: 0
BLOOD IN STOOL: 0
HEARTBURN: 0
CHOKING: 0

## 2021-02-12 NOTE — PROGRESS NOTES
Bo Onofre is a 54 y.o. male. HPI/Chief C/O:  Chief Complaint   Patient presents with    Weight Loss     Patient is concerned about weight loss; he wants to try boost or ensure as he has no appetite    Other     Wants a DNR put into place. No Known Allergies  The patient is here for a medication list and treatment planning review  We will go over our care planning goals as well as take care of all refills  We will set up labs as well     Knee Pain   The incident occurred more than 1 week ago. The pain is present in the left knee and right knee. The quality of the pain is described as shooting and stabbing. The pain is at a severity of 8/10. The pain is severe. The pain has been worsening since onset. Associated symptoms include a loss of motion and muscle weakness. Pertinent negatives include no inability to bear weight, loss of sensation or tingling. Hypertension  Pertinent negatives include no anxiety, blurred vision, chest pain, headaches, malaise/fatigue, neck pain, orthopnea, palpitations, peripheral edema, PND, shortness of breath or sweats. Risk factors for coronary artery disease include smoking/tobacco exposure, stress, male gender, family history and dyslipidemia. Past treatments include lifestyle changes. The current treatment provides significant improvement. Compliance problems include exercise, diet and psychosocial issues. Hypertensive end-organ damage includes CVA. There is no history of angina, kidney disease, CAD/MI, heart failure, left ventricular hypertrophy, PVD or retinopathy. There is no history of chronic renal disease, coarctation of the aorta, hyperaldosteronism, hypercortisolism, hyperparathyroidism, a hypertension causing med, pheochromocytoma, renovascular disease, sleep apnea or a thyroid problem. Cough  This is a chronic problem. The current episode started more than 1 year ago. The problem has been unchanged. The problem occurs every few hours.  The cough is non-productive. Pertinent negatives include no chest pain, ear congestion, ear pain, eye redness, headaches, heartburn, hemoptysis, nasal congestion, postnasal drip, rhinorrhea, shortness of breath, sweats, weight loss or wheezing. He has tried nothing for the symptoms. His past medical history is significant for emphysema. There is no history of asthma, bronchiectasis, bronchitis, environmental allergies or pneumonia. ROS:  Review of Systems   Constitutional: Negative for activity change, appetite change, malaise/fatigue, unexpected weight change and weight loss. HENT: Negative for dental problem, drooling, ear discharge, ear pain, facial swelling, hearing loss, mouth sores, nosebleeds, postnasal drip, rhinorrhea, sinus pressure, sinus pain, sneezing, tinnitus, trouble swallowing and voice change. Eyes: Negative for blurred vision, photophobia, pain, discharge, redness, itching and visual disturbance. Respiratory: Negative for apnea, hemoptysis, choking, chest tightness, shortness of breath, wheezing and stridor. Cardiovascular: Negative for chest pain, palpitations, orthopnea, leg swelling and PND. Gastrointestinal: Negative. Negative for abdominal distention, anal bleeding, blood in stool, constipation, diarrhea, heartburn and rectal pain. Endocrine: Negative. Negative for cold intolerance, heat intolerance, polydipsia, polyphagia and polyuria. Genitourinary: Negative. Negative for decreased urine volume, difficulty urinating, discharge, dysuria, enuresis, flank pain, frequency, genital sores, hematuria, penile pain, penile swelling, scrotal swelling, testicular pain and urgency. Musculoskeletal: Positive for gait problem. Negative for back pain, neck pain and neck stiffness. C/O frozen left shoulder    Skin: Negative. Negative for color change, pallor and wound. Allergic/Immunologic: Negative.   Negative for environmental allergies, food allergies and immunocompromised state.   Neurological: Negative for dizziness, tingling, tremors, seizures, syncope, facial asymmetry, speech difficulty, light-headedness and headaches. Left side weakness post CVA   Hematological: Negative. Negative for adenopathy. Does not bruise/bleed easily. Psychiatric/Behavioral: Negative.          Past Medical/Surgical Hx;  Reviewed with patient      Diagnosis Date    H/O: CVA (cerebrovascular accident) 2020    Infarction of right basal ganglia ([address] 2020    Internal carotid artery occlusion, left 2020    Left leg weakness 2020    Osteoarthritis     Bilateral Knee, Back     Paralysis of left upper extremity ([address] 2020     Past Surgical History:   Procedure Laterality Date    ANESTHESIA NERVE BLOCK Bilateral 2019    BILATERAL INTRA-ARTICULAR FACET JOINT INJECTION WITH FLUOROSCOPIC GUIDANCE AT L3-4 AND L4-5 performed by Frantz Greco DO at [facility] Left     Elbow     HERNIA REPAIR      NERVE BLOCK Bilateral 2019    NERVE BLOCK Left 2019    lumbar radiofrequency    NERVE BLOCK Right 10/10/2019    lumbar medial branch neurolysis     RADIOFREQUENCY ABLATION NERVES Left 2019    RADIOFREQUENCY ABLATION LEFT L3-4 AND LEFT L4-5 FACET JOINTS THEN RIGHT L3-4 AND L4-5 FACET JOINTS (CPT 59534) performed by Frantz Greco DO at [address] Right 10/10/2019    RADIOFREQUENCY ABLATION RIGHT L3-4 AND L4-5 FACET JOINTS performed by Frantz Greco DO at [facility]         Past Family Hx:  Reviewed with patient      Problem Relation Age of Onset    Stroke Mother     COPD Mother     Diabetes Mother        Social Hx:  Reviewed with patient  Social History     Tobacco Use    Smoking status: Current Every Day Smoker     Packs/day: 1.00     Types: Cigarettes     Start date: 1983     Last attempt to quit: 2020     Years since quittin.2    Smokeless tobacco: Never Used    Tobacco comment: smokes 1-1.5 ppd   Substance Use Topics    Alcohol use: No     Comment: Recovering alcoholic 47/53/7830       OBJECTIVE  /74   Pulse 90   Temp 97.8 °F (36.6 °C) (Temporal)   Resp 18   Ht 5' 10\" (1.778 m)   Wt 133 lb (60.3 kg)   SpO2 96%   BMI 19.08 kg/m²     Problem List:  [place] does not have any pertinent problems on file. PHYS EX:  Physical Exam  Vitals signs and nursing note reviewed. Constitutional:       General: He is not in acute distress. Appearance: Normal appearance. He is well-developed. He is not ill-appearing, toxic-appearing or diaphoretic. HENT:      Head: Normocephalic and atraumatic. Right Ear: Tympanic membrane, ear canal and external ear normal. There is no impacted cerumen. Left Ear: Tympanic membrane, ear canal and external ear normal. There is no impacted cerumen. Nose: Nose normal. No congestion or rhinorrhea. Mouth/Throat:      Mouth: Mucous membranes are moist.      Pharynx: Oropharynx is clear. No oropharyngeal exudate or posterior oropharyngeal erythema. Eyes:      General: No scleral icterus. Right eye: No discharge. Left eye: No discharge. Neck:      Musculoskeletal: Normal range of motion and neck supple. No neck rigidity or muscular tenderness. Thyroid: No thyromegaly. Vascular: No carotid bruit or JVD. Trachea: No tracheal deviation. Cardiovascular:      Rate and Rhythm: Normal rate and regular rhythm. Pulses: Normal pulses. Heart sounds: Normal heart sounds. No murmur. No friction rub. No gallop. Pulmonary:      Effort: Pulmonary effort is normal. No respiratory distress. Breath sounds: Normal breath sounds. No stridor. No wheezing, rhonchi or rales. Chest:      Chest wall: No tenderness. Abdominal:      General: Bowel sounds are normal. There is no distension. Palpations: Abdomen is soft. There is no mass.       Tenderness: There is no abdominal tenderness. There is no right CVA tenderness, left CVA tenderness, guarding or rebound. Hernia: No hernia is present. Genitourinary:     Penis: No tenderness. Musculoskeletal: Normal range of motion. General: Tenderness (frozen left shoulder) present. No swelling, deformity or signs of injury. Right lower leg: No edema. Left lower leg: No edema. Comments: Bilateral knee pains      Lymphadenopathy:      Cervical: No cervical adenopathy. Skin:     General: Skin is warm. Coloration: Skin is not jaundiced or pale. Findings: No bruising, erythema, lesion or rash. Neurological:      General: No focal deficit present. Mental Status: He is alert and oriented to person, place, and time. Cranial Nerves: No cranial nerve deficit. Sensory: No sensory deficit. Motor: No weakness or abnormal muscle tone. Coordination: Coordination normal.      Gait: Gait normal.      Deep Tendon Reflexes: Reflexes are normal and symmetric. Reflexes normal.      Comments: Left side weakness post CVA          1. 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 Centrilobular emphysema (HCC)  --stable on current care planning  -- continue treatment as we are meeting goals     - Comprehensive Metabolic Panel; Future  - CBC Auto Differential; Future    3. Adhesive capsulitis of left shoulder      - ibuprofen (ADVIL;MOTRIN) 800 MG tablet; Take 1 tablet by mouth nightly  Dispense: 90 tablet; Refill: 1  - Comprehensive Metabolic Panel; Future  - CBC Auto Differential; Future    4. IFG (impaired fasting glucose)      - Comprehensive Metabolic Panel; Future  - CBC Auto Differential; Future  - Hemoglobin A1C; Future    5.  Dyslipidemia      - Omega-3 Fatty Acids (FISH OIL) 1000 MG CAPS; Take 1 capsule by mouth 3 times daily  Dispense: 90 capsule; Refill: 5  - rosuvastatin (CRESTOR) 10 MG tablet; Take 1 tablet by mouth daily  Dispense: 90 tablet; Refill: 1  - Comprehensive Metabolic Panel; Future  - Lipid Panel; Future  - CBC Auto Differential; Future    6. Fatigue, unspecified type      - guaiFENesin 400 MG tablet; Take 1 tablet by mouth 4 times daily as needed for Cough  Dispense: 56 tablet; Refill: 3  - TSH without Reflex; Future  - Uric Acid; Future  - Comprehensive Metabolic Panel; Future  - CBC Auto Differential; Future    7. Screen for colon cancer      - Cologuard (For External Results Only); Future  - Comprehensive Metabolic Panel; Future  - CBC Auto Differential; Future    8. Screening PSA (prostate specific antigen)      - PSA screening; Future    9. Elevated LFTs      - Hepatitis Panel, Acute; Future        The ASCVD Risk score (Roberta Bejarano, et al., 2013) failed to calculate for the following reasons: The patient has a prior MI or stroke diagnosis    ASSESSMENT/PLAN  Joanna Haywood was seen today for weight loss and other. Diagnoses and all orders for this visit:    Centrilobular emphysema (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 [address]  -     Comprehensive Metabolic Panel; Future  -     CBC Auto Differential; Future  --stable on current care planning  -- continue treatment as we are meeting goals       Adhesive capsulitis of left shoulder  -     ibuprofen (ADVIL;MOTRIN) 800 MG tablet; Take 1 tablet by mouth nightly  -     Comprehensive Metabolic Panel;  Future  -     CBC Auto Differential; Future  --PLAN--inject left shoulder x 2                1/2 cc xylocaine plus 1 cc depo medrol Omt/ultra--Rx        IFG (impaired fasting glucose)  -     Comprehensive Metabolic Panel; Future  -     CBC Auto Differential; Future  -     Hemoglobin A1C; Future  Long talk on treatment and prevention  Literature is given     ---VASCULAR PANEL  A) ASA, PLAVIX, aggrenox  B) coumadin, pletal, tzd, STATIN  C) ace, hctz, FOLIC, ccb  D) cannikinumab, FISH OILS    ---CARDIAC---PLAVIX, ace, beta, STATIN, hctz, ( ccb )      Dyslipidemia  -     Omega-3 Fatty Acids (FISH OIL) 1000 MG CAPS; Take 1 capsule by mouth 3 times daily  -     rosuvastatin (CRESTOR) 10 MG tablet; Take 1 tablet by mouth daily  -     Comprehensive Metabolic Panel; Future  -     Lipid Panel; Future  -     CBC Auto Differential; Future  --Mediterranean diet, exercise, weight loss, vitamins    We have a long talk on cholesterol and importance of lowering it       Fatigue, unspecified type  -     guaiFENesin 400 MG tablet; Take 1 tablet by mouth 4 times daily as needed for Cough  -     TSH without Reflex; Future  -     Uric Acid; Future  -     Comprehensive Metabolic Panel; Future  -     CBC Auto Differential; Future    Screen for colon cancer  -     Cologuard (For External Results Only); Future  -     Comprehensive Metabolic Panel; Future  -     CBC Auto Differential; Future    Screening PSA (prostate specific antigen)  -     PSA screening; Future    Elevated LFTs  -     Hepatitis Panel, Acute;  Future        Outpatient Encounter Medications as of 2/12/2021   Medication Sig Dispense Refill    Omega-3 Fatty Acids (FISH OIL) 1000 MG CAPS Take 1 capsule by mouth 3 times daily 90 capsule 5    ibuprofen (ADVIL;MOTRIN) 800 MG tablet Take 1 tablet by mouth nightly 90 tablet 1    guaiFENesin 400 MG tablet Take 1 tablet by mouth 4 times daily as needed for Cough 56 tablet 3    rosuvastatin (CRESTOR) 10 MG tablet Take 1 tablet by mouth daily 90 tablet 1    tiZANidine (ZANAFLEX) 4 MG tablet Take 1 tablet by mouth 3 times daily 90 tablet 1    aspirin (RA ASPIRIN ADULT LOW STRENGTH) 81 MG chewable tablet Take 1 tablet by mouth once daily. Further refill requires appointment. 90 tablet 0    folic acid (FOLVITE) 1 MG tablet Take 1 tablet by mouth daily. 90 tablet 0    clopidogrel (PLAVIX) 75 MG tablet Take 1 tablet by mouth daily 90 tablet 1    [DISCONTINUED] Omega-3 Fatty Acids (FISH OIL) 1000 MG CAPS Take 3,000 mg by mouth 3 times daily      [DISCONTINUED] guaiFENesin 400 MG tablet Take 400 mg by mouth 4 times daily as needed for Cough      [DISCONTINUED] gabapentin (NEURONTIN) 300 MG capsule Take 1 capsule by mouth 2 times daily for 30 days. 60 capsule 3    [DISCONTINUED] traMADol (ULTRAM) 50 MG tablet Take 1 tablet by mouth every 8 hours as needed for Pain for up to 30 days. Take lowest dose possible to manage pain 90 tablet 1    nicotine (NICODERM CQ) 14 MG/24HR Place 1 patch onto the skin daily (Patient not taking: Reported on 2/12/2021) 30 patch 3    [DISCONTINUED] cyclobenzaprine (FLEXERIL) 10 MG tablet take 1 tablet by mouth three times a day      [DISCONTINUED] diclofenac sodium (VOLTAREN) 1 % GEL Apply 2 g topically 4 times daily 1 Tube 5    [DISCONTINUED] LOVAZA 1 g capsule Take 2 capsules by mouth 2 times daily 120 capsule 3    [DISCONTINUED] ibuprofen (ADVIL;MOTRIN) 800 MG tablet Take 1 tablet by mouth nightly (Patient not taking: Reported on 2/12/2021) 30 tablet 3     No facility-administered encounter medications on file as of 2/12/2021. Return in about 3 months (around 5/12/2021).         Reviewed recent labs related to Christiano's current problems      Discussed importance of regular Health Maintenance follow up  Health Maintenance   Topic    Hepatitis C screen     Pneumococcal 0-64 years Vaccine (1 of 1 - PPSV23)    HIV screen     DTaP/Tdap/Td vaccine (1 - Tdap)    Shingles Vaccine (1 of 2)    Colon cancer screen colonoscopy     Flu vaccine (1)    Lipid screen     Hepatitis A vaccine     Hepatitis B vaccine     Hib vaccine     Meningococcal (ACWY) vaccine

## 2021-02-12 NOTE — PATIENT INSTRUCTIONS
Patient Education        Chronic Obstructive Pulmonary Disease (COPD): Care Instructions  Your Care Instructions     Chronic obstructive pulmonary disease (COPD) is a general term for a group of lung diseases, including emphysema and chronic bronchitis. People with COPD have decreased airflow in and out of the lungs, which makes it hard to breathe. The airways also can get clogged with thick mucus. Cigarette smoking is a major cause of COPD. Although there is no cure for COPD, you can slow its progress. Following your treatment plan and taking care of yourself can help you feel better and live longer. Follow-up care is a key part of your treatment and safety. Be sure to make and go to all appointments, and call your doctor if you are having problems. It's also a good idea to know your test results and keep a list of the medicines you take. How can you care for yourself at home? Staying healthy    · Do not smoke. This is the most important step you can take to prevent more damage to your lungs. If you need help quitting, talk to your doctor about stop-smoking programs and medicines. These can increase your chances of quitting for good.     · Avoid colds and flu. Get a pneumococcal vaccine shot. If you have had one before, ask your doctor whether you need a second dose. Get the flu vaccine every fall. If you must be around people with colds or the flu, wash your hands often.     · Avoid secondhand smoke, air pollution, and high altitudes. Also avoid cold, dry air and hot, humid air. Stay at home with your windows closed when air pollution is bad. Medicines and oxygen therapy    · Take your medicines exactly as prescribed. Call your doctor if you think you are having a problem with your medicine. You may be taking medicines such as:  ? Bronchodilators. These help open your airways and make breathing easier. They are either short-acting (work for 6 to 9 hours) or long-acting (work for 24 hours).  You inhale most bronchodilators, so they start to act quickly. Always carry your quick-relief inhaler with you in case you need it while you are away from home. ? Corticosteroids (prednisone, budesonide). These reduce airway inflammation. They come in pill or inhaled form. You must take these medicines every day for them to work well.     · Ask your doctor or pharmacist if a spacer is right for you. A spacer may help you get more inhaled medicine to your lungs. If you use one, ask how to use it properly.     · Do not take any vitamins, over-the-counter medicine, or herbal products without talking to your doctor first.     · If your doctor prescribed antibiotics, take them as directed. Do not stop taking them just because you feel better. You need to take the full course of antibiotics.     · If you use oxygen therapy, use the flow rate your doctor has recommended. Don't change it without talking to your doctor first. Oxygen therapy boosts the amount of oxygen in your blood and helps you breathe easier. Activity    · Get regular exercise. Walking is an easy way to get exercise. Start out slowly, and walk a little more each day.     · Pay attention to your breathing. You are exercising too hard if you can't talk while you exercise.     · Take short rest breaks when doing household chores and other activities.     · Learn breathing methods--such as breathing through pursed lips--to help you become less short of breath.     · If your doctor has not set you up with a pulmonary rehabilitation program, ask if rehab is right for you. Rehab includes exercise programs, education about your disease and how to manage it, help with diet and other changes, and emotional support. Diet    · Eat regular, healthy meals. Use bronchodilators about 1 hour before you eat to make it easier to eat. Eat several small meals instead of three large ones.  Drink beverages at the end of the meal. Avoid foods that are hard to chew.     · Eat foods that contain protein so you don't lose muscle mass.     · Talk with your doctor if you gain too much weight or if you lose weight without trying. Mental health    · Talk to your family, friends, or a therapist about your feelings. Some people feel frightened, angry, hopeless, helpless, and even guilty. Talking openly about bad feelings can help you cope. If these feelings last, talk to your doctor. When should you call for help? Call 911 anytime you think you may need emergency care. For example, call if:    · You have severe trouble breathing. Call your doctor now or seek immediate medical care if:    · You have new or worse trouble breathing.     · You cough up blood.     · You have a fever. Watch closely for changes in your health, and be sure to contact your doctor if:    · You cough more deeply or more often, especially if you notice more mucus or a change in the color of your mucus.     · You have new or worse swelling in your legs or belly.     · You are not getting better as expected. Where can you learn more? Go to https://[employer]. org and sign in to your [employer] account. Enter X333 in the [employer] box to learn more about \"Chronic Obstructive Pulmonary Disease (COPD): Care Instructions. \"     If you do not have an account, please click on the \"Sign Up Now\" link. Current as of: February 24, 2020               Content Version: 12.6  © 3603-5373 [employer]. Care instructions adapted under license by [facility] ([facility]). If you have questions about a medical condition or this instruction, always ask your healthcare professional. [address] any warranty or liability for your use of this information.

## 2021-02-14 ENCOUNTER — CLINICAL DOCUMENTATION (OUTPATIENT)
Dept: FAMILY MEDICINE CLINIC | Age: 56
End: 2021-02-14

## 2021-02-14 NOTE — PROGRESS NOTES
The ASCVD Risk score (Gisele Shone., et al., 2013) failed to calculate for the following reasons:     The patient has a prior MI or stroke diagnosis

## 2021-02-15 ENCOUNTER — TELEPHONE (OUTPATIENT)
Dept: ORTHOPEDIC SURGERY | Age: 56
End: 2021-02-15

## 2021-02-15 DIAGNOSIS — M75.02 ADHESIVE CAPSULITIS OF LEFT SHOULDER: Primary | ICD-10-CM

## 2021-02-15 DIAGNOSIS — M25.512 LEFT SHOULDER PAIN, UNSPECIFIED CHRONICITY: ICD-10-CM

## 2021-02-15 LAB
HAV IGM SER IA-ACNC: NORMAL
HEPATITIS B CORE IGM ANTIBODY: NORMAL
HEPATITIS B SURFACE ANTIGEN INTERPRETATION: NORMAL
HEPATITIS C ANTIBODY INTERPRETATION: NORMAL

## 2021-02-15 NOTE — TELEPHONE ENCOUNTER
[facility] called. Requesting referral for OT Lt shoulder instead of PT. Order pended in [facility]

## 2021-02-25 NOTE — PROGRESS NOTES
Overdue results letter mailed to patient regarding cologuard order.   Electronically signed by Betty Venegas on 2/25/2021 at 8:15 AM

## 2021-03-09 NOTE — PROGRESS NOTES
12:45 PM 
2021 See previous call back note. Pt returned call. Verified . Discussed + GC and need for Im Rocephin. RTED ASAP for tx. NKDA. Discussed 7 days for cure after tx. Notify partners. FU for more complete STI testing. Pt expressed understanding.   
 
Shannon Conde PA-C 
 
 
 fanning with active assist and isolated radial abduction/adduction with active assist.   -Added isolated Radial/bower/ abd/add of thumb. Fist pumps x 15 reps Full fist into quick release, active extension noted this date. Assist to finish actively extending digits. Scapular Retract/Protract & Trap Shrugs x 20 reps ea Improved movement noted.  -Added shoulder circles, forward and backward. Lift and Place  10 [id number] reps ea Pt to lift into shoulder flexion and abduction and hold at 90*, then relax while therapist lowers extremity. Then completed vise versa. Min A to supervision required for the active movements. UBE  10 min 5 forward and 5 backwards with strap to hold L hand onto handle, level 2 resistance. Table Towel Ex's  25 reps ea Shoulder flex/abd/horiz abd/add, elbow ext/flex, scapular pro/retract. No assist required. PROM/Stretching:      L UE All Planes x 5 reps ea Completed while seated upright. Not able to tolerate anything further than 30* with shoulder flexion and abduction. Neuro Re-Ed:      Weight Bearing for Proprioceptive Input  15 min Lying on L side to WB into shoulder, then onto elbow, then seated upright with L hand flat on mat and elbow blocked with max A. -WBing elbow into M green resistance putty x 15 reps. Then stand and WB full arm with flat hand in putty x 20 reps. Elbow blocker utilized. Cuing for posture and positioning required (drive shoulder down, do not bend over). Extended time required for breaks d/t fatigue. Digit/Wrist Extension x 10 reps ea Attempting various neuro techniques such as tapping to facilitate extensors during active movement. Therapeutic Activity:      FM  10 min Removing large pegs from board with pinchers with min A for support at elbow.  -Placing large pegs into board with min A to mod A and multiple verbal cues. Strengthenin# Elbow Flex/Ext  2x15 reps Elbow supported on table.    Hand Gripper  2x15 reps At level 1 resistance using black spring. Putty Strengthening  10 min Provided with yellow soft resistance putty for HEP. Completed gripping and pulling apart, min A required for digit extension. Resistive Strengthening  15 reps ea Elbow flex/ext against therapist.  Added digit flexion into full fist.   Other:      Re-Assessmnet  15 min In-depth re-assessment completed, requesting additional visits. Soft tissue  x 10 min To L shoulder and neck to reduce tightness. Sling   Readjusted new splint for proper positioning. Re-educated on wear and care. Splint Fabrication Continues w/ wear  Fabricated pan splint to place digits in full extension to tighten extensors and reduce flaccidity. Will continue to make adjustments as needed. Positioning Education Continues incorporating  Completed in regards to sleeping at night. KT x 10 min Completed taping for repositioning of glenohumeral head d/t shoulder subluxation. Pt reported instant pain relief. Also completed extension facilitation to all digits and wrist this date with good results to improve functional use of the hand. Assessment/Comments: Pt is making Fair progress toward stated plan of care. Continuing with conservative treatment with heavy focus on reducing pain through soft tissue manipulation, gentle AAROM, TENS, and KT. Pain relief reported by end of session. Improved extension facilitation of digits noted this date with FES. Added Scapula strengthening exercises to HEP as it is beginning to wing d/t muscular weakness. Pt was able to tolerate exercises during session, he is to complete 3x/day beginning at 10 reps each as long as pain is tolerable. -Rehab Potential: Good  -Requires OT Follow Up:  Yes              Time In: 1:00 pm            Time Out: 2:00 pm     Treatment Charges: Mins Units   Modalities 30 2   Ther Exercise     Manual Therapy 15 1   Thera Activities 15 1   ADL/Home Mgt      Neuro Re-education     Gait Training     Group Therapy     Non-Billable Service Time     Other     Total Time/Units 60 4     -Response to Treatment: Pt tolerated treatment session well today. Pain increasing in shoulder, reduced pain reported by end of session. GOALS (Long term same as Short term):  1) Patient will demonstrate good understanding of home program (exercises/activities/diagnosis/prognosis/goals) with good accuracy. Progressing slowly, pt demonstrating poor compliance with HEP. He was educated on simple AROM exercises to maintain mobility and reduce pain in the shoulder. It reduced pain in shoulder at start of therapy when he was complying to HEP. However, as therapy continues, pt admits to not completing exercises at home due to lack of motivation. Re-educated on importance off HEP and how it can help to reduce his pain and increase his shoulder mobility. 2) Patient will demonstrate increased active range of motion of their L to [facility] for ADL/IADL completion. Progressing slowly, pt demonstrating active movement throughout the L UE in which he only had contraction with palpation at time of initial evaluation. See above. 3) Patient will demonstrate a /pinch strength of at least 10 / 5 pinch pounds of their L hand. Progressing slowly, was able to assess  strength today as he was unable at time of eval. Unable to do 3-pnt pinch. 4) Patient to report decreased pain in their affected L upper extremity from 6/10 to 2/10 or less with resistive functional use. Regressing, shoulder pain continues to increase/maintain number. Pt not completing pain management or AAROM ex's at home. ^'d pain at night time. 5) Patient will demonstrate fine motor function by being able to complete 9-hole peg test and/or MRMT by a minute and a half or less. Progressing slowly, pt was able to  a peg today with support at wrist but could not complete test.  6) Patient to report 100% compliance with their splint wear, care, and precautions if needed.    Progressing slowly, pt was wearing sling consistently up until the past week. He has began just placing hand in pocket because he gets frustrated with sling don/doff. Asked pt to bring sling in. 7) Patient to demonstrate decreased guarding of their affected extremity from 100% to 50% or less. Progressing slowly, at about 75-85% has been encouraging himself more to sure his L  for support but still uses R arm for placement and control of the L UE. 8) Patient will report ADL functions as Mod I/I using bilateral UE's and adaptive equipment as needed. Progressing slowly, still requires assist with several tasks. Discussed multiple options for AE. 9) Patient will decrease QuickDASH score by 30% for increased participation in daily functional activities. Progressing slowly. 10) Patient/caregiver to demonstrate proper follow through of home modification/adaptive recommendations to increase safe functional ADLs if needed. Progressing slowly, therapist and pt have been discussing several options for affordable AE. He is to come with a new limitation each session so we may discuss AE or ways to modify. 11) Patient will demonstrate increased strength in the R UE by demonstrating improved MMT grades by a minimum of 2 grades throughout to improve daily functional use. Progressing well; however, current grades still create ^'d difficulty with completing daily functional tasks. Plan:   [x]  Continues Plan of care: Treatment covered based on POC and graduated to patient's progress. Pt education continues at each visit to obtain maximum benefits from skilled OT intervention. Additional 12 visits approved 10/5/2020 by Dr. Zia Meraz.    []  Alter Plan of care:   []  Discharge:    Karyna Pérez [address], OTR/L #[id number]

## 2021-04-01 ENCOUNTER — HOSPITAL ENCOUNTER (OUTPATIENT)
Dept: OCCUPATIONAL THERAPY | Age: 56
Setting detail: THERAPIES SERIES
Discharge: HOME | End: 2021-04-01
Payer: MEDICAID

## 2021-04-01 NOTE — PROGRESS NOTES
[facility] NOTIFICATION    4/1/2021    Dear Dr. Tae Guan MD :    This is to inform you that, as per [facility] Occupational Therapy department policy, your patient, Jia Perez, [id number], is as of todays date being discharged from Occupational Therapy secondary to the following reasons:     Pt was placed on hold in November per his request d/t ^ pain in the shoulder. He wanted to get it further addressed prior to return to therapy. Pt called in February reporting he was diagnosed with a frozen shoulder and was going to return to therapy. However, he reports his referral was for physical therapy at [place]. Pt is being discharged as he has initiated services elsewhere. Please refer to 10/5/2020 for his last measurements while attending OT with [facility] ([facility]). If you have any questions, feel free to call us at [address] [place] [address], [phone]. Thank you     Cammie Rm, [facility], OTR/L #[id number]  [address] [place]  [phone] (FAX)      By co-signing this letter of discharge notification, I demonstrate that I have read the above notification and understand this discharge of POC.

## 2021-04-07 RX ORDER — FOLIC ACID 1 MG/1
TABLET ORAL
Qty: 90 TABLET | Refills: 1 | Status: SHIPPED
Start: 2021-04-07 | End: 2021-06-09 | Stop reason: SDUPTHER

## 2021-04-12 ENCOUNTER — OFFICE VISIT (OUTPATIENT)
Dept: NEUROLOGY | Age: 56
End: 2021-04-12
Payer: MEDICAID

## 2021-04-12 VITALS
HEART RATE: 73 BPM | WEIGHT: 130 LBS | TEMPERATURE: 97.9 F | SYSTOLIC BLOOD PRESSURE: 149 MMHG | HEIGHT: 70 IN | RESPIRATION RATE: 12 BRPM | OXYGEN SATURATION: 93 % | DIASTOLIC BLOOD PRESSURE: 84 MMHG | BODY MASS INDEX: 18.61 KG/M2

## 2021-04-12 DIAGNOSIS — I82.4Z2 ACUTE DEEP VEIN THROMBOSIS (DVT) OF DISTAL VEIN OF LEFT LOWER EXTREMITY (HCC): ICD-10-CM

## 2021-04-12 DIAGNOSIS — I63.511 CEREBROVASCULAR ACCIDENT (CVA) DUE TO OCCLUSION OF RIGHT MIDDLE CEREBRAL ARTERY (HCC): Primary | ICD-10-CM

## 2021-04-12 PROCEDURE — G8427 DOCREV CUR MEDS BY ELIG CLIN: HCPCS | Performed by: CLINICAL NURSE SPECIALIST

## 2021-04-12 PROCEDURE — 4004F PT TOBACCO SCREEN RCVD TLK: CPT | Performed by: CLINICAL NURSE SPECIALIST

## 2021-04-12 PROCEDURE — G8420 CALC BMI NORM PARAMETERS: HCPCS | Performed by: CLINICAL NURSE SPECIALIST

## 2021-04-12 PROCEDURE — 3017F COLORECTAL CA SCREEN DOC REV: CPT | Performed by: CLINICAL NURSE SPECIALIST

## 2021-04-12 PROCEDURE — 99214 OFFICE O/P EST MOD 30 MIN: CPT | Performed by: CLINICAL NURSE SPECIALIST

## 2021-04-12 RX ORDER — GABAPENTIN 300 MG/1
1 CAPSULE ORAL 2 TIMES DAILY
COMMUNITY
Start: 2021-02-14 | End: 2021-04-14 | Stop reason: SDUPTHER

## 2021-04-18 DIAGNOSIS — I63.9 ACUTE CVA (CEREBROVASCULAR ACCIDENT) (HCC): ICD-10-CM

## 2021-04-19 ENCOUNTER — HOSPITAL ENCOUNTER (OUTPATIENT)
Dept: ULTRASOUND IMAGING | Age: 56
Discharge: HOME | End: 2021-04-21
Payer: MEDICAID

## 2021-04-19 DIAGNOSIS — I82.4Z2 ACUTE DEEP VEIN THROMBOSIS (DVT) OF DISTAL VEIN OF LEFT LOWER EXTREMITY (HCC): ICD-10-CM

## 2021-04-19 PROCEDURE — 93970 EXTREMITY STUDY: CPT

## 2021-04-19 PROCEDURE — 93970 EXTREMITY STUDY: CPT | Performed by: RADIOLOGY

## 2021-04-19 RX ORDER — CLOPIDOGREL BISULFATE 75 MG/1
TABLET ORAL
Qty: 90 TABLET | Refills: 1 | Status: SHIPPED
Start: 2021-04-19 | End: 2021-06-09 | Stop reason: SDUPTHER

## 2021-04-25 ENCOUNTER — HOSPITAL ENCOUNTER (EMERGENCY)
Age: 56
Discharge: LEFT BEFORE DISCHARGE | End: 2021-04-25
Payer: MEDICAID

## 2021-04-25 VITALS
OXYGEN SATURATION: 98 % | HEART RATE: 64 BPM | DIASTOLIC BLOOD PRESSURE: 76 MMHG | TEMPERATURE: 96.8 F | WEIGHT: 130 LBS | SYSTOLIC BLOOD PRESSURE: 130 MMHG | BODY MASS INDEX: 18.61 KG/M2 | HEIGHT: 70 IN | RESPIRATION RATE: 16 BRPM

## 2021-04-25 DIAGNOSIS — S61.210A LACERATION OF RIGHT INDEX FINGER WITHOUT FOREIGN BODY WITHOUT DAMAGE TO NAIL, INITIAL ENCOUNTER: Primary | ICD-10-CM

## 2021-04-25 PROCEDURE — 90715 TDAP VACCINE 7 YRS/> IM: CPT | Performed by: PHYSICIAN ASSISTANT

## 2021-04-25 PROCEDURE — 2500000003 HC RX 250 WO HCPCS: Performed by: PHYSICIAN ASSISTANT

## 2021-04-25 PROCEDURE — 90471 IMMUNIZATION ADMIN: CPT | Performed by: PHYSICIAN ASSISTANT

## 2021-04-25 PROCEDURE — 6360000002 HC RX W HCPCS: Performed by: PHYSICIAN ASSISTANT

## 2021-04-25 PROCEDURE — 99284 EMERGENCY DEPT VISIT MOD MDM: CPT

## 2021-04-25 PROCEDURE — 12001 RPR S/N/AX/GEN/TRNK 2.5CM/<: CPT

## 2021-04-25 RX ORDER — HYDROCODONE BITARTRATE AND ACETAMINOPHEN 5; 325 MG/1; MG/1
1 TABLET ORAL ONCE
Status: DISCONTINUED | OUTPATIENT
Start: 2021-04-25 | End: 2021-04-25 | Stop reason: HOSPADM

## 2021-04-25 RX ORDER — ACETAMINOPHEN 650 MG
TABLET, EXTENDED RELEASE ORAL ONCE
Status: DISCONTINUED | OUTPATIENT
Start: 2021-04-25 | End: 2021-04-25 | Stop reason: HOSPADM

## 2021-04-25 RX ORDER — BACITRACIN ZINC AND POLYMYXIN B SULFATE 500; 1000 [USP'U]/G; [USP'U]/G
OINTMENT TOPICAL
Qty: 1 TUBE | Refills: 1 | Status: SHIPPED | OUTPATIENT
Start: 2021-04-25 | End: 2021-05-02

## 2021-04-25 RX ORDER — LIDOCAINE HYDROCHLORIDE 20 MG/ML
15 SOLUTION OROPHARYNGEAL ONCE
Status: DISCONTINUED | OUTPATIENT
Start: 2021-04-25 | End: 2021-04-25

## 2021-04-25 RX ORDER — LIDOCAINE HYDROCHLORIDE 20 MG/ML
5 INJECTION, SOLUTION INFILTRATION; PERINEURAL ONCE
Status: COMPLETED | OUTPATIENT
Start: 2021-04-25 | End: 2021-04-25

## 2021-04-25 RX ADMIN — TETANUS TOXOID, REDUCED DIPHTHERIA TOXOID AND ACELLULAR PERTUSSIS VACCINE, ADSORBED 0.5 ML: 5; 2.5; 8; 8; 2.5 SUSPENSION INTRAMUSCULAR at 13:55

## 2021-04-25 RX ADMIN — LIDOCAINE HYDROCHLORIDE 5 ML: 20 INJECTION, SOLUTION INFILTRATION; PERINEURAL at 14:09

## 2021-04-25 ASSESSMENT — PAIN DESCRIPTION - PROGRESSION: CLINICAL_PROGRESSION: GRADUALLY WORSENING

## 2021-04-25 ASSESSMENT — PAIN DESCRIPTION - ORIENTATION: ORIENTATION: RIGHT

## 2021-04-25 ASSESSMENT — PAIN DESCRIPTION - DESCRIPTORS: DESCRIPTORS: THROBBING

## 2021-04-25 ASSESSMENT — PAIN DESCRIPTION - LOCATION: LOCATION: FINGER (COMMENT WHICH ONE)

## 2021-04-25 ASSESSMENT — PAIN SCALES - GENERAL: PAINLEVEL_OUTOF10: 9

## 2021-04-25 ASSESSMENT — PAIN DESCRIPTION - PAIN TYPE: TYPE: ACUTE PAIN

## 2021-04-25 ASSESSMENT — PAIN DESCRIPTION - ONSET: ONSET: ON-GOING

## 2021-04-25 ASSESSMENT — PAIN - FUNCTIONAL ASSESSMENT: PAIN_FUNCTIONAL_ASSESSMENT: PREVENTS OR INTERFERES SOME ACTIVE ACTIVITIES AND ADLS

## 2021-04-25 NOTE — ED NOTES
Vaccine information sheet for TDAP and record card given to patient.      Danna Zimmerman RN  04/25/21 [id number]

## 2021-04-28 ENCOUNTER — TELEPHONE (OUTPATIENT)
Dept: VASCULAR SURGERY | Age: 56
End: 2021-04-28

## 2021-04-29 ENCOUNTER — OFFICE VISIT (OUTPATIENT)
Dept: VASCULAR SURGERY | Age: 56
End: 2021-04-29
Payer: MEDICAID

## 2021-04-29 DIAGNOSIS — I65.22 INTERNAL CAROTID ARTERY OCCLUSION, LEFT: Primary | ICD-10-CM

## 2021-04-29 DIAGNOSIS — Z86.73 H/O: CVA (CEREBROVASCULAR ACCIDENT): ICD-10-CM

## 2021-04-29 DIAGNOSIS — Z72.0 TOBACCO ABUSE: ICD-10-CM

## 2021-04-29 DIAGNOSIS — M79.605 PAIN IN LEFT LEG: ICD-10-CM

## 2021-04-29 DIAGNOSIS — R09.89 DECREASED DORSALIS PEDIS PULSE: ICD-10-CM

## 2021-04-29 PROBLEM — S20.212A CONTUSION OF RIB ON LEFT SIDE: Status: RESOLVED | Noted: 2018-09-05 | Resolved: 2021-04-29

## 2021-04-29 PROBLEM — S62.502D: Status: RESOLVED | Noted: 2018-09-05 | Resolved: 2021-04-29

## 2021-04-29 PROBLEM — G83.24 PARALYSIS OF LEFT UPPER EXTREMITY (HCC): Status: RESOLVED | Noted: 2020-06-22 | Resolved: 2021-04-29

## 2021-04-29 PROBLEM — I63.9 STROKE ABORTED BY ADMINISTRATION OF THROMBOLYTIC AGENT (HCC): Status: RESOLVED | Noted: 2020-06-21 | Resolved: 2021-04-29

## 2021-04-29 PROBLEM — I63.9 ACUTE CVA (CEREBROVASCULAR ACCIDENT) (HCC): Status: RESOLVED | Noted: 2020-06-24 | Resolved: 2021-04-29

## 2021-04-29 PROCEDURE — 4004F PT TOBACCO SCREEN RCVD TLK: CPT | Performed by: SURGERY

## 2021-04-29 PROCEDURE — G8420 CALC BMI NORM PARAMETERS: HCPCS | Performed by: SURGERY

## 2021-04-29 PROCEDURE — G8427 DOCREV CUR MEDS BY ELIG CLIN: HCPCS | Performed by: SURGERY

## 2021-04-29 PROCEDURE — 3017F COLORECTAL CA SCREEN DOC REV: CPT | Performed by: SURGERY

## 2021-04-29 PROCEDURE — 99214 OFFICE O/P EST MOD 30 MIN: CPT | Performed by: SURGERY

## 2021-04-29 NOTE — PROGRESS NOTES
nightly 90 tablet 1    aspirin (RA ASPIRIN ADULT LOW STRENGTH) 81 MG chewable tablet Take 1 tablet by mouth once daily. Further refill requires appointment. 90 tablet 0     No current facility-administered medications for this visit.         Past Medical History:   Diagnosis Date    Decreased dorsalis pedis pulse 4/29/2021    H/O: CVA (cerebrovascular accident) 6/22/2020    Infarction of right basal ganglia ([address] 6/24/2020    Internal carotid artery occlusion, left 6/22/2020    Left leg weakness 6/22/2020    Osteoarthritis     Bilateral Knee, Back     Pain in left leg 4/29/2021    Paralysis of left upper extremity ([address] 6/22/2020       Past Surgical History:   Procedure Laterality Date    ANESTHESIA NERVE BLOCK Bilateral 4/11/2019    BILATERAL INTRA-ARTICULAR FACET JOINT INJECTION WITH FLUOROSCOPIC GUIDANCE AT L3-4 AND L4-5 performed by Isidro Caldera DO at [address] Left     Elbow     HERNIA REPAIR      NERVE BLOCK Bilateral 04/11/2019    NERVE BLOCK Left 08/01/2019    lumbar radiofrequency    NERVE BLOCK Right 10/10/2019    lumbar medial branch neurolysis     RADIOFREQUENCY ABLATION NERVES Left 8/1/2019    RADIOFREQUENCY ABLATION LEFT L3-4 AND LEFT L4-5 FACET JOINTS THEN RIGHT L3-4 AND L4-5 FACET JOINTS (CPT 03482) performed by Isidro Caldera DO at [address] Right 10/10/2019    RADIOFREQUENCY ABLATION RIGHT L3-4 AND L4-5 FACET JOINTS performed by Isidro Caldera DO at [address] [facility]         Family History   Problem Relation Age of Onset    Stroke Mother     COPD Mother     Diabetes Mother        Social History     Socioeconomic History    Marital status: Single     Spouse name: Not on file    Number of children: Not on file    Years of education: Not on file    Highest education level: Not on file   Occupational History    Not on file   Social Needs    Financial resource strain: Not on and rhythm. No rub or murmur  Abdomen:  Soft, non-tender. No masses, organomegaly. Musculoskeletal : No joint effusions, tenderness swelling history of disc disease and sciatica  Neuro: Speech is intact. Moving all extremities. No focal motor or sensory deficits, history of stroke with mild left-sided weakness      Extremities:  Both feet are warm to touch. The color of both feet is normal.  No leg swelling      Pulses Right  Left    Brachial 3 3    Radial    3=normal   Femoral 2 2  2=diminished   Popliteal    1=barely palpable   Dorsalis pedis 1 1  0=absent   Posterior tibial 2 2  4=aneurysmal             Other pertinent information:1. The past medical records were reviewed. 2. The recent venous ultrasound study reviewed, no DVT of the left leg    3. The past records reviewed, occlusion of the left internal carotid artery    Assessment:    1. Internal carotid artery occlusion, left    2. Pain in left leg    3. Decreased dorsalis pedis pulse    4. H/O: CVA (cerebrovascular accident)    5. Tobacco abuse              Plan:       Discussed the patient, options risks benefits were explained, it is felt, patient symptoms of pain in the left leg are not from a vascular etiology most likely musculoskeletal or neurologic etiology patient recommended work-up as outlined below and to call me. If any increasing symptoms    Patient was counseled to stop smoking completely              Patient was instructed to continue walking program and to call if any worsening of symptoms and to call if any focal lateralizing neurological symptoms like loss of speech, vision or loss of function of extremity. All the questions were answered. Orders Placed This Encounter   Procedures    US CAROTID ARTERY BILATERAL    VL JONA BILATERAL LIMITED 1-2 LEVELS             Indicated follow-up: Return in about 1 year (around 4/29/2022), or if symptoms worsen or fail to improve.

## 2021-05-27 ENCOUNTER — TELEPHONE (OUTPATIENT)
Dept: VASCULAR SURGERY | Age: 56
End: 2021-05-27

## 2021-05-28 ENCOUNTER — HOSPITAL ENCOUNTER (OUTPATIENT)
Dept: CARDIOLOGY | Age: 56
Discharge: HOME | End: 2021-05-28
Payer: MEDICAID

## 2021-05-28 DIAGNOSIS — I65.22 INTERNAL CAROTID ARTERY OCCLUSION, LEFT: ICD-10-CM

## 2021-05-28 DIAGNOSIS — R09.89 DECREASED DORSALIS PEDIS PULSE: ICD-10-CM

## 2021-05-28 PROCEDURE — 93923 UPR/LXTR ART STDY 3+ LVLS: CPT

## 2021-05-28 PROCEDURE — 93880 EXTRACRANIAL BILAT STUDY: CPT

## 2021-05-31 ENCOUNTER — TELEPHONE (OUTPATIENT)
Dept: VASCULAR SURGERY | Age: 56
End: 2021-05-31

## 2021-05-31 NOTE — PROCEDURES
[address]                  [address] [facility],  [facility]                                VASCULAR REPORT    PATIENT NAME: Marcy Chauhan                      :        1965  MED REC NO:   [id number]                            ROOM:  ACCOUNT NO:   [de-identified]                           ADMIT DATE: 2021  PROVIDER:     Boy Cornejo MD    DATE OF PROCEDURE:  2021    CAROTID ULTRASOUND REPORT    INDICATION:  Right carotid stenosis, associated with chronic occlusive  left internal carotid artery. FINDINGS:  Duplex of right carotid artery revealed the patient has  moderate intimal thickening with peak internal carotid velocity of 696,  diastolic velocity of 42 cm/sec with a plaque causing 30% stenosis. On  the left side, the Doppler signal is absent, consistent with known  chronic occlusion of the left internal carotid artery. IMPRESSION:  Chronic occlusion of left internal carotid artery  associated with 30% stenosis on right, unchanged from last year.         Ok Segura MD    D: 2021 18:50:26       T: 2021 18:52:12     [email or web address]  Job#: [id number]     Doc#: [id number]    CC:   Nadira Fang MD

## 2021-05-31 NOTE — PROCEDURES
[address]                  [address] [facility],  [facility]                                VASCULAR REPORT    PATIENT NAME: Carisa Rain                      :        1965  MED REC NO:   [id number]                            ROOM:  ACCOUNT NO:   [de-identified]                           ADMIT DATE: 2021  PROVIDER:     Satish Mckeon MD    DATE OF PROCEDURE:  2021    ANKLE-BRACHIAL INDEX    INDICATION:  Pain in the left ankle.     FINDINGS:  The ankle-brachial index is normal with triphasic ankle  Doppler tracings and good arterial flow to both feet based upon the  pulse volume recordings over the metatarsal.      Madeleine Jones MD    D: 2021 18:51:27       T: 2021 18:53:16     [email or web address]  Job#: [id number]     Doc#: [id number]    CC:  Myrna Fall DO

## 2021-06-01 ENCOUNTER — TELEPHONE (OUTPATIENT)
Dept: VASCULAR SURGERY | Age: 56
End: 2021-06-01

## 2021-06-01 NOTE — TELEPHONE ENCOUNTER
Called to notify patient that right side is very mild blockage, left side occluded as before, not to worry, keep next year appointment.

## 2021-06-01 NOTE — TELEPHONE ENCOUNTER
----- Message from Hernando Cheng MD sent at 5/29/2021  5:13 PM EDT -----  Please notify patient, right-sided only very mild blockage, not to worry left side occluded as before, overall no change, keep the next appointment next year

## 2021-06-09 ENCOUNTER — OFFICE VISIT (OUTPATIENT)
Dept: NEUROLOGY | Age: 56
End: 2021-06-09
Payer: MEDICAID

## 2021-06-09 ENCOUNTER — OFFICE VISIT (OUTPATIENT)
Dept: FAMILY MEDICINE CLINIC | Age: 56
End: 2021-06-09
Payer: MEDICAID

## 2021-06-09 VITALS
TEMPERATURE: 99.3 F | SYSTOLIC BLOOD PRESSURE: 120 MMHG | RESPIRATION RATE: 18 BRPM | HEART RATE: 84 BPM | OXYGEN SATURATION: 96 % | WEIGHT: 130 LBS | HEIGHT: 70 IN | DIASTOLIC BLOOD PRESSURE: 75 MMHG | BODY MASS INDEX: 18.61 KG/M2

## 2021-06-09 VITALS
HEART RATE: 78 BPM | SYSTOLIC BLOOD PRESSURE: 94 MMHG | WEIGHT: 125 LBS | TEMPERATURE: 97.6 F | OXYGEN SATURATION: 98 % | RESPIRATION RATE: 16 BRPM | HEIGHT: 70 IN | DIASTOLIC BLOOD PRESSURE: 62 MMHG | BODY MASS INDEX: 17.9 KG/M2

## 2021-06-09 DIAGNOSIS — I73.9 PVD (PERIPHERAL VASCULAR DISEASE) (HCC): Primary | ICD-10-CM

## 2021-06-09 DIAGNOSIS — Z12.11 SCREEN FOR COLON CANCER: ICD-10-CM

## 2021-06-09 DIAGNOSIS — E78.5 DYSLIPIDEMIA: ICD-10-CM

## 2021-06-09 DIAGNOSIS — R73.01 IFG (IMPAIRED FASTING GLUCOSE): ICD-10-CM

## 2021-06-09 DIAGNOSIS — I63.9 ACUTE CVA (CEREBROVASCULAR ACCIDENT) (HCC): ICD-10-CM

## 2021-06-09 DIAGNOSIS — I63.511 CEREBROVASCULAR ACCIDENT (CVA) DUE TO OCCLUSION OF RIGHT MIDDLE CEREBRAL ARTERY (HCC): Primary | ICD-10-CM

## 2021-06-09 DIAGNOSIS — I95.1 ORTHOSTATIC HYPOTENSION: ICD-10-CM

## 2021-06-09 PROCEDURE — 3017F COLORECTAL CA SCREEN DOC REV: CPT | Performed by: CLINICAL NURSE SPECIALIST

## 2021-06-09 PROCEDURE — 4004F PT TOBACCO SCREEN RCVD TLK: CPT | Performed by: FAMILY MEDICINE

## 2021-06-09 PROCEDURE — 99214 OFFICE O/P EST MOD 30 MIN: CPT | Performed by: FAMILY MEDICINE

## 2021-06-09 PROCEDURE — 4004F PT TOBACCO SCREEN RCVD TLK: CPT | Performed by: CLINICAL NURSE SPECIALIST

## 2021-06-09 PROCEDURE — G8419 CALC BMI OUT NRM PARAM NOF/U: HCPCS | Performed by: FAMILY MEDICINE

## 2021-06-09 PROCEDURE — G8420 CALC BMI NORM PARAMETERS: HCPCS | Performed by: CLINICAL NURSE SPECIALIST

## 2021-06-09 PROCEDURE — 99214 OFFICE O/P EST MOD 30 MIN: CPT | Performed by: CLINICAL NURSE SPECIALIST

## 2021-06-09 PROCEDURE — 3017F COLORECTAL CA SCREEN DOC REV: CPT | Performed by: FAMILY MEDICINE

## 2021-06-09 PROCEDURE — G8427 DOCREV CUR MEDS BY ELIG CLIN: HCPCS | Performed by: FAMILY MEDICINE

## 2021-06-09 PROCEDURE — G8427 DOCREV CUR MEDS BY ELIG CLIN: HCPCS | Performed by: CLINICAL NURSE SPECIALIST

## 2021-06-09 RX ORDER — FOLIC ACID 1 MG/1
TABLET ORAL
Qty: 90 TABLET | Refills: 1 | Status: SHIPPED
Start: 2021-06-09 | End: 2021-08-25 | Stop reason: SDUPTHER

## 2021-06-09 RX ORDER — TRAMADOL HYDROCHLORIDE 50 MG/1
50 TABLET ORAL EVERY 6 HOURS PRN
COMMUNITY
End: 2021-06-11 | Stop reason: SDUPTHER

## 2021-06-09 RX ORDER — CILOSTAZOL 50 MG/1
50 TABLET ORAL 2 TIMES DAILY
Qty: 180 TABLET | Refills: 1 | Status: SHIPPED
Start: 2021-06-09 | End: 2021-11-29

## 2021-06-09 RX ORDER — CLOPIDOGREL BISULFATE 75 MG/1
TABLET ORAL
Qty: 90 TABLET | Refills: 1 | Status: SHIPPED | OUTPATIENT
Start: 2021-06-09 | End: 2021-08-13 | Stop reason: SDUPTHER

## 2021-06-09 ASSESSMENT — ENCOUNTER SYMPTOMS
RHINORRHEA: 0
TROUBLE SWALLOWING: 0
PHOTOPHOBIA: 0
BACK PAIN: 0
COLOR CHANGE: 0
RECTAL PAIN: 0
SINUS PAIN: 0
CHEST TIGHTNESS: 0
FACIAL SWELLING: 0
WHEEZING: 0
BLOOD IN STOOL: 0
CHOKING: 0
EYE REDNESS: 0
ABDOMINAL DISTENTION: 0
ANAL BLEEDING: 0
CONSTIPATION: 0
VOICE CHANGE: 0
ORTHOPNEA: 0
DIARRHEA: 0
EYE ITCHING: 0
EYE PAIN: 0
ALLERGIC/IMMUNOLOGIC NEGATIVE: 1
BLURRED VISION: 0
APNEA: 0
RESPIRATORY NEGATIVE: 1
COUGH: 1
GASTROINTESTINAL NEGATIVE: 1
EYE DISCHARGE: 0
STRIDOR: 0
SINUS PRESSURE: 0
SHORTNESS OF BREATH: 0

## 2021-06-09 NOTE — PROGRESS NOTES
Nickie Link is a 54 y.o. male. HPI/Chief C/O:  Chief Complaint   Patient presents with    Dizziness     Lightheaded/dizzy, especially when standing up, or in the heat.  Results     No Known Allergies  The patient is here for a medication list and treatment planning review  We will go over our care planning goals as well as take care of all refills  We will set up labs as well   He is post neurology and vascular consults     Knee Pain   The incident occurred more than 1 week ago. The pain is present in the left knee and right knee. The quality of the pain is described as shooting and stabbing. The pain is at a severity of 8/10. The pain is severe. The pain has been worsening since onset. Associated symptoms include a loss of motion and muscle weakness. Pertinent negatives include no inability to bear weight, loss of sensation or tingling. Hypertension  Pertinent negatives include no anxiety, blurred vision, chest pain, headaches, malaise/fatigue, orthopnea, palpitations, peripheral edema, shortness of breath or sweats. Risk factors for coronary artery disease include smoking/tobacco exposure, stress, male gender, family history and dyslipidemia. Past treatments include lifestyle changes. The current treatment provides significant improvement. Compliance problems include exercise, diet and psychosocial issues. Hypertensive end-organ damage includes CVA and PVD. There is no history of angina, kidney disease, CAD/MI, heart failure, left ventricular hypertrophy or retinopathy. There is no history of chronic renal disease, coarctation of the aorta, hyperaldosteronism, hypercortisolism, hyperparathyroidism, a hypertension causing med, pheochromocytoma, renovascular disease, sleep apnea or a thyroid problem. Cough  This is a chronic problem. The current episode started more than 1 year ago. The problem has been unchanged. The problem occurs every few hours. The cough is non-productive.  Pertinent negatives include no chest pain, ear congestion, ear pain, eye redness, headaches, nasal congestion, postnasal drip, rhinorrhea, shortness of breath, sweats, weight loss or wheezing. He has tried nothing for the symptoms. His past medical history is significant for emphysema. There is no history of asthma, bronchiectasis, bronchitis, environmental allergies or pneumonia. ROS:  Review of Systems   Constitutional: Negative for activity change, appetite change, malaise/fatigue, unexpected weight change and weight loss. HENT: Negative. Negative for dental problem, drooling, ear discharge, ear pain, facial swelling, hearing loss, mouth sores, nosebleeds, postnasal drip, rhinorrhea, sinus pressure, sinus pain, sneezing, tinnitus, trouble swallowing and voice change. Eyes: Negative for blurred vision, photophobia, pain, discharge, redness, itching and visual disturbance. Respiratory: Negative. Negative for apnea, choking, chest tightness, shortness of breath, wheezing and stridor. Cardiovascular: Negative. Negative for chest pain, palpitations, orthopnea and leg swelling. Gastrointestinal: Negative. Negative for abdominal distention, anal bleeding, blood in stool, constipation, diarrhea and rectal pain. Endocrine: Negative. Negative for cold intolerance, heat intolerance, polydipsia, polyphagia and polyuria. Genitourinary: Negative. Negative for decreased urine volume, difficulty urinating, discharge, dysuria, enuresis, flank pain, frequency, genital sores, hematuria, penile pain, penile swelling, scrotal swelling, testicular pain and urgency. Musculoskeletal: Positive for gait problem. Negative for back pain and neck stiffness. C/O frozen left shoulder    Skin: Negative. Negative for color change, pallor and wound. Allergic/Immunologic: Negative. Negative for environmental allergies, food allergies and immunocompromised state.    Neurological: Negative for tingling, tremors, seizures, syncope, facial asymmetry, speech difficulty, light-headedness and headaches. Left side weakness post CVA   Hematological: Negative. Negative for adenopathy. Does not bruise/bleed easily. Psychiatric/Behavioral: Positive for dysphoric mood. Negative for agitation, behavioral problems, confusion, decreased concentration, hallucinations, self-injury, sleep disturbance and suicidal ideas. The patient is nervous/anxious. The patient is not hyperactive.          Past Medical/Surgical Hx;  Reviewed with patient      Diagnosis Date    Decreased dorsalis pedis pulse 4/29/2021    H/O: CVA (cerebrovascular accident) 6/22/2020    Infarction of right basal ganglia ([address] 6/24/2020    Internal carotid artery occlusion, left 6/22/2020    Left leg weakness 6/22/2020    Osteoarthritis     Bilateral Knee, Back     Pain in left leg 4/29/2021    Paralysis of left upper extremity ([address] 6/22/2020     Past Surgical History:   Procedure Laterality Date    ANESTHESIA NERVE BLOCK Bilateral 4/11/2019    BILATERAL INTRA-ARTICULAR FACET JOINT INJECTION WITH FLUOROSCOPIC GUIDANCE AT L3-4 AND L4-5 performed by Brayden Yost DO at [facility] Left     Elbow     HERNIA REPAIR      NERVE BLOCK Bilateral 04/11/2019    NERVE BLOCK Left 08/01/2019    lumbar radiofrequency    NERVE BLOCK Right 10/10/2019    lumbar medial branch neurolysis     RADIOFREQUENCY ABLATION NERVES Left 8/1/2019    RADIOFREQUENCY ABLATION LEFT L3-4 AND LEFT L4-5 FACET JOINTS THEN RIGHT L3-4 AND L4-5 FACET JOINTS (CPT [id number]) performed by Brayden Yost DO at [address] Right 10/10/2019    RADIOFREQUENCY ABLATION RIGHT L3-4 AND L4-5 FACET JOINTS performed by Brayden Yost DO at [address]         Past Family Hx:  Reviewed with patient      Problem Relation Age of Onset    Stroke Mother     COPD Mother     Diabetes Mother        Social Hx:  Reviewed with patient  Social History     Tobacco Use    Smoking status: Current Every Day Smoker     Packs/day: 0.10     Types: Cigarettes     Start date: 1/1/1983    Smokeless tobacco: Never Used    Tobacco comment: smokes 1-1.5 ppd   Substance Use Topics    Alcohol use: No     Comment: Recovering alcoholic 46/43/5950       OBJECTIVE  BP 94/62   Pulse 78   Temp 97.6 °F (36.4 °C)   Resp 16   Ht 5' 10\" (1.778 m)   Wt 125 lb (56.7 kg)   SpO2 98%   BMI 17.94 kg/m²     Problem List:  Roverto Jenkins does not have any pertinent problems on file. PHYS EX:  Physical Exam  Vitals and nursing note reviewed. Constitutional:       General: He is not in acute distress. Appearance: Normal appearance. He is well-developed. He is not ill-appearing, toxic-appearing or diaphoretic. HENT:      Head: Normocephalic and atraumatic. Right Ear: External ear normal. There is no impacted cerumen. Left Ear: External ear normal. There is no impacted cerumen. Nose: Nose normal. No congestion or rhinorrhea. Mouth/Throat:      Mouth: Mucous membranes are moist.      Pharynx: Oropharynx is clear. No oropharyngeal exudate or posterior oropharyngeal erythema. Eyes:      General: No scleral icterus. Right eye: No discharge. Left eye: No discharge. Neck:      Thyroid: No thyromegaly. Vascular: No carotid bruit or JVD. Trachea: No tracheal deviation. Cardiovascular:      Rate and Rhythm: Normal rate and regular rhythm. Pulses: Normal pulses. Heart sounds: Normal heart sounds. No murmur heard. No friction rub. No gallop. Pulmonary:      Effort: Pulmonary effort is normal. No respiratory distress. Breath sounds: Normal breath sounds. No stridor. No wheezing, rhonchi or rales. Chest:      Chest wall: No tenderness. Abdominal:      General: Bowel sounds are normal. There is no distension. Palpations: Abdomen is soft. There is no mass. Tenderness:  There is no abdominal tenderness. There is no right CVA tenderness, left CVA tenderness, guarding or rebound. Hernia: No hernia is present. Genitourinary:     Penis: No tenderness. Musculoskeletal:         General: Tenderness (frozen left shoulder) present. No swelling, deformity or signs of injury. Normal range of motion. Cervical back: Normal range of motion and neck supple. No rigidity. No muscular tenderness. Right lower leg: No edema. Left lower leg: No edema. Comments: Bilateral knee pains      Lymphadenopathy:      Cervical: No cervical adenopathy. Skin:     General: Skin is warm. Coloration: Skin is not jaundiced or pale. Findings: No bruising, erythema, lesion or rash. Neurological:      General: No focal deficit present. Mental Status: He is alert and oriented to person, place, and time. Cranial Nerves: No cranial nerve deficit. Sensory: No sensory deficit. Motor: Weakness present. No abnormal muscle tone. Coordination: Coordination abnormal.      Gait: Gait abnormal.      Deep Tendon Reflexes: Reflexes are normal and symmetric. Reflexes normal.      Comments: Left side weakness post CVA          The 10-year ASCVD risk score (Gigi Vang, et al., 2013) is: 9.2%    Values used to calculate the score:      Age: 54 years      Sex: Male      Is Non- : No      Diabetic: No      Tobacco smoker: Yes      Systolic Blood Pressure: 94 mmHg      Is BP treated: No      HDL Cholesterol: 38 mg/dL      Total Cholesterol: 231 mg/dL    ASSESSMENT/PLAN  Jess Medina was seen today for dizziness and results. Diagnoses and all orders for this visit:    PVD (peripheral vascular disease) ([facility]  -     clopidogrel (PLAVIX) 75 MG tablet; take 1 tablet by mouth once daily  -     folic acid (FOLVITE) 1 MG tablet; Take 1 tablet by mouth daily. -     cilostazol (PLETAL) 50 MG tablet;  Take 1 tablet by mouth 2 times daily  -     Basic Metabolic Panel; Future  -     CBC Auto Differential; Future  Long talk on treatment and prevention  Literature is given   --follows with vascular     Acute CVA (cerebrovascular accident) ([facility]  -     clopidogrel (PLAVIX) 75 MG tablet; take 1 tablet by mouth once daily  -     folic acid (FOLVITE) 1 MG tablet; Take 1 tablet by mouth daily. -     cilostazol (PLETAL) 50 MG tablet; Take 1 tablet by mouth 2 times daily  -     Basic Metabolic Panel; Future  -     CBC Auto Differential; Future    ---VASCULAR PANEL  A) ASA, PLAVIX, aggrenox  B) coumadin, PLETAL, tzd, statin  C) ace, hctz, FOLIC, ccb  D) cannikinumab, FISH OILS    ---CARDIAC---ASA, ace, beta, statin, hctz, ( ccb )    IFG (impaired fasting glucose)  -     Basic Metabolic Panel; Future  -     CBC Auto Differential; Future  -     Hemoglobin A1C; Future  --stable on current care planning  -- continue treatment as we are meeting goals       Dyslipidemia  -     Basic Metabolic Panel; Future  -     Lipid Panel; Future  -     CBC Auto Differential; Future  --Mediterranean diet, exercise, weight loss, vitamins    We have a long talk on cholesterol and importance of lowering it       Screen for colon cancer  -     Yari (For External Results Only); Future  -     Basic Metabolic Panel; Future  -     CBC Auto Differential; Future        Outpatient Encounter Medications as of 6/9/2021   Medication Sig Dispense Refill    traMADol (ULTRAM) 50 MG tablet Take 50 mg by mouth every 6 hours as needed for Pain.  clopidogrel (PLAVIX) 75 MG tablet take 1 tablet by mouth once daily 90 tablet 1    folic acid (FOLVITE) 1 MG tablet Take 1 tablet by mouth daily. 90 tablet 1    cilostazol (PLETAL) 50 MG tablet Take 1 tablet by mouth 2 times daily 180 tablet 1    Liniments (ABSORBINE ARTHRITIS STRENGTH EX) Apply topically Horse liniment.  gabapentin (NEURONTIN) 300 MG capsule Take 1 capsule by mouth 3 times daily for 60 days.  90 capsule 1    tiZANidine (ZANAFLEX) 4 MG tablet Take 1 tablet by mouth 3 times daily 90 tablet 1    Omega-3 Fatty Acids (FISH OIL) 1000 MG CAPS Take 1 capsule by mouth 3 times daily 90 capsule 5    ibuprofen (ADVIL;MOTRIN) 800 MG tablet Take 1 tablet by mouth nightly 90 tablet 1    aspirin (RA ASPIRIN ADULT LOW STRENGTH) 81 MG chewable tablet Take 1 tablet by mouth once daily. Further refill requires appointment. 90 tablet 0    [DISCONTINUED] clopidogrel (PLAVIX) 75 MG tablet take 1 tablet by mouth once daily 90 tablet 1    [DISCONTINUED] folic acid (FOLVITE) 1 MG tablet Take 1 tablet by mouth daily. 90 tablet 1     No facility-administered encounter medications on file as of 6/9/2021. Return in about 3 months (around 9/9/2021).         Reviewed recent labs related to Christiano's current problems      Discussed importance of regular Health Maintenance follow up  Health Maintenance   Topic    Pneumococcal 0-64 years Vaccine (1 of 2 - PPSV23)    COVID-19 Vaccine (1)    HIV screen     Shingles Vaccine (1 of 2)    Colon cancer screen colonoscopy     Flu vaccine (Season Ended)    Lipid screen     DTaP/Tdap/Td vaccine (2 - Td or Tdap)    Hepatitis C screen     Hepatitis A vaccine     Hepatitis B vaccine     Hib vaccine     Meningococcal (ACWY) vaccine

## 2021-06-09 NOTE — PATIENT INSTRUCTIONS
Patient Education        Learning About Peripheral Arterial Disease (PAD)  What is peripheral arterial disease? Peripheral arterial disease (PAD) is narrowing or blockage of arteries that causes poor blood flow to your arms and legs. PAD is most common in the legs. The most common cause of PAD is the buildup of plaque on the inside of arteries. Over time, plaque builds up in the walls of the arteries, including those that supply blood to your legs. If you have PAD, you're likely to have plaque in other arteries in your body. This raises your risk of a heart attack and stroke. Medicines and lifestyle changes may lower your risk of heart attack and stroke. They may also help if you have symptoms. In some cases, surgery or other treatment is needed. Peripheral arterial disease is also called peripheral vascular disease. What are the symptoms? Many people who have PAD don't have symptoms. If you have symptoms, they may include a tight, aching, or squeezing pain in your calf, thigh, or buttock. This pain is called intermittent claudication. It usually happens after you have walked a certain distance. The pain goes away if you stop walking. As PAD gets worse, you may have pain in your foot or toe when you aren't walking. Other symptoms may include weak or tired legs. You might have trouble walking or balancing. If PAD gets worse, you may have other symptoms caused by poor blood flow to your legs and feet. These symptoms aren't common. They may include cold or numb feet or toes, sores that are slow to heal, or leg or foot pain when you're at rest.  How can you prevent PAD? · Quit smoking. Quitting smoking is one of the best things you can do to help prevent PAD. If you need help quitting, talk to your doctor about stop-smoking programs and medicines. These can increase your chances of quitting for good. · Stay at a healthy weight.   · Manage other health problems, including diabetes, high blood pressure, and

## 2021-06-09 NOTE — PROGRESS NOTES
Nic Dahl is a 54 y.o. right handed male     Patient presented to ED with sudden onset of left sided weakness (while driving by the hospital)   Tele-stroke notified and his NIHSS was 6   CTA demonstrated no LVO but revealed a suspected chronic occlusion of his left proximal carotid  CTP demonstrated no mismatch   Echo unrevealing     He received IV tPA and improved initially however several hours afterwards, redeveloped the same left sided weakness     History of tobacco and alcohol abuse     Now maintained on DAPT - this was supposed to be until end of September but he continues   States PCP wanted to continue this regimen   Unable to tolerate statin therefore PCP is started a fish oil    Discussed stroke risk at this time     Vascular did evaluate for his chronic left ICA occlusion   Also ultrasound of lower extremity    Now feeling lightheaded with position changes.   States that heat makes it worse as well as hot showers  He does notice if he takes sodium his signs and symptoms improve  He is drinking as much fluid as he possibly can reportedly    Known LS radiculopathy    No chest pain or palpitations  No SOB  No vertigo, lightheadedness or loss of consciousness  No incontinence of bowels or bladder  No itching or bruising appreciated  ROS otherwise negative     Allergies as of 06/09/2021    (No Known Allergies)     Objective:     /75 (Site: Right Upper Arm, Position: Sitting, Cuff Size: Medium Adult)   Pulse 84   Temp 99.3 °F (37.4 °C)   Resp 18   Ht 5' 10\" (1.778 m)   Wt 130 lb (59 kg)   SpO2 96%   BMI 18.65 kg/m²      Sitting for me 138/82  Standing for me 108/74     General appearance: alert, appears stated age and cooperative  Head: Normocephalic, without obvious abnormality, atraumatic  Neck: limited ROM  Extremities: no cyanosis or edema - left arm marked limited passive/active ROM (improved)   Pulses: 2+ and symmetric  Skin: no rashes or lesions    Mental Status: Alert, oriented, thought content appropriate    Speech: clear  Language: appropriate    Cranial Nerves:  I: smell    II: visual acuity     II: visual fields Full   II: pupils KOMAL   III,VII: ptosis None   III,IV,VI: extraocular muscles  EOMI without nystagmus    V: mastication Normal   V: facial light touch sensation  Normal   V,VII: corneal reflex  Present   VII: facial muscle function - upper     VII: facial muscle function - lower Normal   VIII: hearing Normal   IX: soft palate elevation  Normal   IX,X: gag reflex    XI: trapezius strength  5/5   XI: sternocleidomastoid strength 5/5   XI: neck extension strength  5/5   XII: tongue strength  Normal     Motor:  5/5 throughout right arm and leg  5/5 left IPS, ant tibs and 5/5 left gastroc  4+/5 left bicep and 4/5 left    Normal bulk and tone    Sensory:  Normal to LT     Coordination:   FN, FFM and BONNY decreased left relative to weakness     Gait with cane     No Babinski  No Khan's     Laboratory/Radiology:     CBC with Differential:    Lab Results   Component Value Date    WBC 18.3 02/12/2021    RBC 4.99 02/12/2021    HGB 15.6 02/12/2021    HCT 45.5 02/12/2021     02/12/2021    MCV 91.2 02/12/2021    MCH 31.3 02/12/2021    MCHC 34.3 02/12/2021    RDW 13.8 02/12/2021    LYMPHOPCT 17.8 02/12/2021    MONOPCT 6.8 02/12/2021    BASOPCT 0.4 02/12/2021    MONOSABS 1.24 02/12/2021    LYMPHSABS 3.25 02/12/2021    EOSABS 0.17 02/12/2021    BASOSABS 0.07 02/12/2021     CMP:    Lab Results   Component Value Date     02/12/2021    K 4.3 02/12/2021    K 4.1 06/25/2020     02/12/2021    CO2 27 02/12/2021    BUN 8 02/12/2021    CREATININE 0.8 02/12/2021    GFRAA >60 02/12/2021    LABGLOM >60 02/12/2021    GLUCOSE 99 02/12/2021    PROT 7.5 02/12/2021    LABALBU 4.4 02/12/2021    CALCIUM 9.4 02/12/2021    BILITOT 0.3 02/12/2021    ALKPHOS 86 02/12/2021    AST 11 02/12/2021    ALT <5 02/12/2021     HgBA1c:    Lab Results   Component Value Date    LABA1C 5.1 02/12/2021

## 2021-06-15 ENCOUNTER — OFFICE VISIT (OUTPATIENT)
Dept: NON INVASIVE DIAGNOSTICS | Age: 56
End: 2021-06-15
Payer: MEDICAID

## 2021-06-15 VITALS
BODY MASS INDEX: 18.09 KG/M2 | DIASTOLIC BLOOD PRESSURE: 78 MMHG | SYSTOLIC BLOOD PRESSURE: 132 MMHG | OXYGEN SATURATION: 96 % | HEIGHT: 70 IN | RESPIRATION RATE: 16 BRPM | WEIGHT: 126.4 LBS | HEART RATE: 80 BPM

## 2021-06-15 DIAGNOSIS — I73.9 PVD (PERIPHERAL VASCULAR DISEASE) (HCC): ICD-10-CM

## 2021-06-15 DIAGNOSIS — I95.1 ORTHOSTATIC HYPOTENSION: Primary | ICD-10-CM

## 2021-06-15 DIAGNOSIS — G89.29 CHRONIC PAIN OF BOTH KNEES: ICD-10-CM

## 2021-06-15 DIAGNOSIS — M25.561 CHRONIC PAIN OF BOTH KNEES: ICD-10-CM

## 2021-06-15 DIAGNOSIS — M25.562 CHRONIC PAIN OF BOTH KNEES: ICD-10-CM

## 2021-06-15 DIAGNOSIS — Z86.73 H/O: CVA (CEREBROVASCULAR ACCIDENT): ICD-10-CM

## 2021-06-15 PROCEDURE — 3017F COLORECTAL CA SCREEN DOC REV: CPT | Performed by: INTERNAL MEDICINE

## 2021-06-15 PROCEDURE — 99204 OFFICE O/P NEW MOD 45 MIN: CPT | Performed by: INTERNAL MEDICINE

## 2021-06-15 PROCEDURE — 93000 ELECTROCARDIOGRAM COMPLETE: CPT | Performed by: INTERNAL MEDICINE

## 2021-06-15 PROCEDURE — G8419 CALC BMI OUT NRM PARAM NOF/U: HCPCS | Performed by: INTERNAL MEDICINE

## 2021-06-15 PROCEDURE — G8427 DOCREV CUR MEDS BY ELIG CLIN: HCPCS | Performed by: INTERNAL MEDICINE

## 2021-06-15 PROCEDURE — 4004F PT TOBACCO SCREEN RCVD TLK: CPT | Performed by: INTERNAL MEDICINE

## 2021-06-15 RX ORDER — MIDODRINE HYDROCHLORIDE 5 MG/1
5 TABLET ORAL 3 TIMES DAILY
Qty: 90 TABLET | Refills: 3 | Status: SHIPPED | OUTPATIENT
Start: 2021-06-15

## 2021-06-15 ASSESSMENT — ENCOUNTER SYMPTOMS
ABDOMINAL DISTENTION: 0
SINUS PRESSURE: 0
WHEEZING: 0
CHEST TIGHTNESS: 0
NAUSEA: 0
COLOR CHANGE: 0
COUGH: 0
SHORTNESS OF BREATH: 0
ABDOMINAL PAIN: 0
DIARRHEA: 0
EYE REDNESS: 0

## 2021-06-15 NOTE — PROGRESS NOTES
Cardiac Electrophysiology Outpatient Progress Note    Rivka Mcqueen  1965  Date of Service: 6/15/2021  Referring Provider/PCP: Abiola Kay DO  Chief Complaint:   Chief Complaint   Patient presents with    Hypotension     New Patient, Ortostatic Hypotension, Pt gets lightheaded and confused upon standing, He said when he is sitting he has to gasp for air, can feel heart thumping in head and chest sometimes,          HISTORY OF PRESENT ILLNESS    Rivka Mcqueen presents to the office today for the management of these Electrophysiology conditions: Dizziness, orthostatic hypotension    He has hx of PVD, CVA ( 6/24/20 hospitalization - According to records : he presented to ED with sudden onset of left sided weakness, He received IV tPA and improved initially however several hours afterwards, redeveloped the same left sided weakness. CTA demonstrated  a suspected chronic occlusion of his left proximal carotid). He is now maintained on DAPT       6/15/21 : He has a feeling of lightheadedness with position changes for last several months. He states that heat makes it worse as well as hot showers. He does notice if he takes sodium, his signs and symptoms improve and he feels much better. He is drinking as much fluid as he possibly can ~ drinks about 2L a day of water and gatorade. We are asked to evaluate him for orthostatic hypotension. He does drink about 1 cup of coffee a day. He is a recovering alcoholic. He lives alone but has friends nearby, does not drive.  He denies any chest pain, felipe syncope      Patient Active Problem List    Diagnosis Date Noted    PVD (peripheral vascular disease) ([facility] 06/09/2021     Overview Note:     Left leg is blocked and right is starting to block       Pain in left leg 04/29/2021    Decreased dorsalis pedis pulse 04/29/2021    Infarction of right basal ganglia ([address] 06/24/2020    Hyperlipidemia 06/22/2020    H/O: CVA (cerebrovascular accident) 06/22/2020    Internal carotid artery occlusion, left 06/22/2020    Left leg weakness 06/22/2020    Lumbar spondylosis 04/11/2019    Centrilobular emphysema ([address] 09/05/2018    Chronic bilateral low back pain with sciatica 08/08/2018    Chronic pain of both knees 08/08/2018    Primary osteoarthritis involving multiple joints 07/10/2018    Tobacco abuse 07/10/2018       Family History   Problem Relation Age of Onset    Stroke Mother     COPD Mother     Diabetes Mother        SOCIAL HISTORY : Smokes about 3 cigs/day, no alcohol    Past Surgical History:   Procedure Laterality Date    ANESTHESIA NERVE BLOCK Bilateral 4/11/2019    BILATERAL INTRA-ARTICULAR FACET JOINT INJECTION WITH FLUOROSCOPIC GUIDANCE AT L3-4 AND L4-5 performed by Joe Llanos DO at [address] Left     Elbow     HERNIA REPAIR      NERVE BLOCK Bilateral 04/11/2019    NERVE BLOCK Left 08/01/2019    lumbar radiofrequency    NERVE BLOCK Right 10/10/2019    lumbar medial branch neurolysis     RADIOFREQUENCY ABLATION NERVES Left 8/1/2019    RADIOFREQUENCY ABLATION LEFT L3-4 AND LEFT L4-5 FACET JOINTS THEN RIGHT L3-4 AND L4-5 FACET JOINTS (CPT [id number]) performed by Joe Llanos DO at [address] [place] Right 10/10/2019    RADIOFREQUENCY ABLATION RIGHT L3-4 AND L4-5 FACET JOINTS performed by Joe Llanos DO at [address] [facility]         Current Outpatient Medications   Medication Sig Dispense Refill    traMADol (ULTRAM) 50 MG tablet Take 1 tablet by mouth every 8 hours as needed for Pain for up to 30 days. 90 tablet 2    gabapentin (NEURONTIN) 300 MG capsule Take 1 capsule by mouth 3 times daily for 60 days.  (Patient taking differently: Take 300 mg by mouth 2 times daily. ) 90 capsule 2    tiZANidine (ZANAFLEX) 4 MG tablet Take 1 tablet by mouth 3 times daily 90 tablet 2    clopidogrel (PLAVIX) 75 MG tablet take 1 tablet by mouth once daily 90 tablet 1    folic acid (FOLVITE) 1 MG tablet Take 1 tablet by mouth daily. 90 tablet 1    cilostazol (PLETAL) 50 MG tablet Take 1 tablet by mouth 2 times daily 180 tablet 1    Liniments (ABSORBINE ARTHRITIS STRENGTH EX) Apply topically Horse liniment.  Omega-3 Fatty Acids (FISH OIL) 1000 MG CAPS Take 1 capsule by mouth 3 times daily 90 capsule 5    aspirin (RA ASPIRIN ADULT LOW STRENGTH) 81 MG chewable tablet Take 1 tablet by mouth once daily. Further refill requires appointment. 90 tablet 0    ibuprofen (ADVIL;MOTRIN) 800 MG tablet Take 1 tablet by mouth nightly (Patient not taking: Reported on 6/15/2021) 90 tablet 1     No current facility-administered medications for this visit. No Known Allergies        ROS:   Review of Systems   Constitutional: Negative for fatigue and fever. HENT: Negative for congestion, nosebleeds and sinus pressure. Eyes: Negative for redness and visual disturbance. Respiratory: Negative for cough, chest tightness, shortness of breath and wheezing. Cardiovascular: Negative for chest pain, palpitations and leg swelling. Gastrointestinal: Negative for abdominal distention, abdominal pain, diarrhea and nausea. Endocrine: Negative for cold intolerance, heat intolerance, polydipsia and polyphagia. Genitourinary: Negative for difficulty urinating, frequency and urgency. Musculoskeletal: Negative for arthralgias, joint swelling and myalgias. Skin: Negative for color change and wound. Neurological: Positive for dizziness. Negative for syncope, weakness and numbness. Psychiatric/Behavioral: Negative for agitation, behavioral problems, decreased concentration, hallucinations and suicidal ideas. The patient is not nervous/anxious.             PHYSICAL EXAM:  Vitals:    06/15/21 1505 06/15/21 1516 06/15/21 1517   BP: 112/66 120/78 132/78   Site: Right Upper Arm Right Upper Arm Right Upper Arm   Position: Supine Sitting Standing   Cuff Size: Medium Adult Medium Adult Medium pool in the legs when standing and may improve orthostatic intolerance        2. CVA  - DAPT          Plan :  1. Lifestyle modifications as stated above  2. Midodrine 5 mg TID, repeat BMP and vitals in one week        Thank you for allowing me to participate in your patient's care. I have spent a total of 40 minutes with the patient and his/her family reviewing the above stated recommendations. A total of >50% of that time involved face-to-face time providing counseling and or coordination of care with the other providers.     Ama Cross MD  Cardiac Electrophysiology  [address] [place] [address]

## 2021-06-15 NOTE — PATIENT INSTRUCTIONS
[facility]/Elect[facility]     Neurocardiogenic Syncope Instructions     Advised life style modifications as below     - Make all postural changes from lying to sitting or sitting to standing slowly. - Drink to 2.0 -2.5 L of fluids per day. With bad symptoms, drink 500 cc of water quickly. This will result in an increased blood pressure within 5 minutes of drinking the water. The effect will last up to one hour and may improve orthostatic intolerance. - Increase sodium in the diet to 3 - 5 g per day. If not helpful and BP is stable, may try 5-7 g per day. - Avoid large meals which can cause low blood pressure during digestion. It is better to eat smaller meals more often than three large meals. - Avoid alcohol. Alcohol and cause blood to pool in the legs which may worsen low blood pressure reactions when standing. Avoid excessive caffeine intake as it may increase urine production and reduce blood volume. - Perform lower extremity exercises to improve strength of the leg muscles. This will help prevent blood from a pooling in the legs when standing and walking. Preferred exercises are walking, squatting or stationery bicycling.      -Use physical counter maneuvers such as leg crossing, or leg raising and resting the leg on a chair. These maneuvers increase blood pressure and can improve orthostatic intolerance quickly and transiently. - Raise the head of the bed by 6 to 10 inches. The entire bed must be at an angle. Raising only the head portion of the bed at waist level or using pillows will not be effective. Raising the head of the bed will reduce urine formation overnight and there will be more volume in the circulation in the morning.      - Use custom fitted elastic support stockings.  These will reduce a tendency for blood to pool in the legs when standing and may improve orthostatic intolerance    If there are any questions, please call the office and we will return your

## 2021-06-16 ENCOUNTER — TELEPHONE (OUTPATIENT)
Dept: NON INVASIVE DIAGNOSTICS | Age: 56
End: 2021-06-16

## 2021-06-16 NOTE — TELEPHONE ENCOUNTER
----- Message from Shawn Rosales MD sent at 6/15/2021  3:38 PM EDT -----  Pls call in midodrine 5 mg TID  Repeat BMP and vitals orthostatic in one week  thx [address]- [facility]  [id number]

## 2021-06-22 ENCOUNTER — NURSE ONLY (OUTPATIENT)
Dept: NON INVASIVE DIAGNOSTICS | Age: 56
End: 2021-06-22
Payer: MEDICAID

## 2021-06-22 VITALS
SYSTOLIC BLOOD PRESSURE: 130 MMHG | OXYGEN SATURATION: 99 % | RESPIRATION RATE: 16 BRPM | DIASTOLIC BLOOD PRESSURE: 80 MMHG | HEART RATE: 76 BPM | WEIGHT: 123.4 LBS | HEIGHT: 70 IN | BODY MASS INDEX: 17.67 KG/M2

## 2021-06-22 DIAGNOSIS — M25.561 CHRONIC PAIN OF BOTH KNEES: ICD-10-CM

## 2021-06-22 DIAGNOSIS — Z86.73 H/O: CVA (CEREBROVASCULAR ACCIDENT): ICD-10-CM

## 2021-06-22 DIAGNOSIS — I95.1 ORTHOSTATIC HYPOTENSION: ICD-10-CM

## 2021-06-22 DIAGNOSIS — I73.9 PVD (PERIPHERAL VASCULAR DISEASE) (HCC): ICD-10-CM

## 2021-06-22 DIAGNOSIS — G89.29 CHRONIC PAIN OF BOTH KNEES: ICD-10-CM

## 2021-06-22 DIAGNOSIS — M25.562 CHRONIC PAIN OF BOTH KNEES: ICD-10-CM

## 2021-06-22 LAB
ANION GAP SERPL CALCULATED.3IONS-SCNC: 8 MMOL/L (ref 7–16)
BUN BLDV-MCNC: 4 MG/DL (ref 6–20)
CALCIUM SERPL-MCNC: 9.3 MG/DL (ref 8.6–10.2)
CHLORIDE BLD-SCNC: 97 MMOL/L (ref 98–107)
CO2: 28 MMOL/L (ref 22–29)
CREAT SERPL-MCNC: 0.8 MG/DL (ref 0.7–1.2)
GFR AFRICAN AMERICAN: >60
GFR NON-AFRICAN AMERICAN: >60 ML/MIN/1.73
GLUCOSE BLD-MCNC: 76 MG/DL (ref 74–99)
POTASSIUM SERPL-SCNC: 4.1 MMOL/L (ref 3.5–5)
SODIUM BLD-SCNC: 133 MMOL/L (ref 132–146)

## 2021-06-22 PROCEDURE — 36415 COLL VENOUS BLD VENIPUNCTURE: CPT | Performed by: INTERNAL MEDICINE

## 2021-06-22 NOTE — PROGRESS NOTES
Patient was seen in Office today for Vitals check and BMP per Dr. Micaela Tello. Vitals are charted. Blood draw tolerated from right arm.      Electronically signed by Salty Bennett MA on 6/22/2021 at 10:29 AM

## 2021-08-13 DIAGNOSIS — I63.9 ACUTE CVA (CEREBROVASCULAR ACCIDENT) (HCC): ICD-10-CM

## 2021-08-13 DIAGNOSIS — I73.9 PVD (PERIPHERAL VASCULAR DISEASE) (HCC): ICD-10-CM

## 2021-08-13 RX ORDER — CLOPIDOGREL BISULFATE 75 MG/1
TABLET ORAL
Qty: 30 TABLET | Refills: 0 | Status: SHIPPED
Start: 2021-08-13 | End: 2021-08-25 | Stop reason: SDUPTHER

## 2021-08-13 RX ORDER — ASPIRIN 81 MG/1
TABLET, CHEWABLE ORAL
Qty: 30 TABLET | Refills: 0 | Status: SHIPPED
Start: 2021-08-13 | End: 2021-08-25 | Stop reason: SDUPTHER

## 2021-08-25 ENCOUNTER — OFFICE VISIT (OUTPATIENT)
Dept: FAMILY MEDICINE CLINIC | Age: 56
End: 2021-08-25
Payer: MEDICAID

## 2021-08-25 VITALS
SYSTOLIC BLOOD PRESSURE: 112 MMHG | WEIGHT: 121 LBS | BODY MASS INDEX: 17.32 KG/M2 | HEIGHT: 70 IN | HEART RATE: 70 BPM | RESPIRATION RATE: 18 BRPM | DIASTOLIC BLOOD PRESSURE: 64 MMHG | OXYGEN SATURATION: 96 % | TEMPERATURE: 98.1 F

## 2021-08-25 DIAGNOSIS — Z12.11 SCREEN FOR COLON CANCER: ICD-10-CM

## 2021-08-25 DIAGNOSIS — I63.9 ACUTE CVA (CEREBROVASCULAR ACCIDENT) (HCC): ICD-10-CM

## 2021-08-25 DIAGNOSIS — I73.9 PVD (PERIPHERAL VASCULAR DISEASE) (HCC): Primary | ICD-10-CM

## 2021-08-25 DIAGNOSIS — M75.02 ADHESIVE CAPSULITIS OF LEFT SHOULDER: ICD-10-CM

## 2021-08-25 DIAGNOSIS — I10 HYPERTENSION, UNSPECIFIED TYPE: ICD-10-CM

## 2021-08-25 DIAGNOSIS — R73.01 IFG (IMPAIRED FASTING GLUCOSE): ICD-10-CM

## 2021-08-25 DIAGNOSIS — F17.210 SMOKING GREATER THAN 30 PACK YEARS: ICD-10-CM

## 2021-08-25 DIAGNOSIS — R53.83 FATIGUE, UNSPECIFIED TYPE: ICD-10-CM

## 2021-08-25 DIAGNOSIS — E78.5 DYSLIPIDEMIA: ICD-10-CM

## 2021-08-25 DIAGNOSIS — Z87.891 PERSONAL HISTORY OF TOBACCO USE: ICD-10-CM

## 2021-08-25 PROCEDURE — G8427 DOCREV CUR MEDS BY ELIG CLIN: HCPCS | Performed by: FAMILY MEDICINE

## 2021-08-25 PROCEDURE — G0296 VISIT TO DETERM LDCT ELIG: HCPCS | Performed by: FAMILY MEDICINE

## 2021-08-25 PROCEDURE — 4004F PT TOBACCO SCREEN RCVD TLK: CPT | Performed by: FAMILY MEDICINE

## 2021-08-25 PROCEDURE — G8419 CALC BMI OUT NRM PARAM NOF/U: HCPCS | Performed by: FAMILY MEDICINE

## 2021-08-25 PROCEDURE — 99214 OFFICE O/P EST MOD 30 MIN: CPT | Performed by: FAMILY MEDICINE

## 2021-08-25 PROCEDURE — 3017F COLORECTAL CA SCREEN DOC REV: CPT | Performed by: FAMILY MEDICINE

## 2021-08-25 RX ORDER — CLOPIDOGREL BISULFATE 75 MG/1
TABLET ORAL
Qty: 90 TABLET | Refills: 1 | Status: SHIPPED | OUTPATIENT
Start: 2021-08-25

## 2021-08-25 RX ORDER — IBUPROFEN 800 MG/1
800 TABLET ORAL NIGHTLY
Qty: 90 TABLET | Refills: 1 | Status: SHIPPED
Start: 2021-08-25 | End: 2021-10-13 | Stop reason: SDUPTHER

## 2021-08-25 RX ORDER — FOLIC ACID 1 MG/1
TABLET ORAL
Qty: 90 TABLET | Refills: 1 | Status: SHIPPED
Start: 2021-08-25 | End: 2021-10-13 | Stop reason: SDUPTHER

## 2021-08-25 RX ORDER — CHLORAL HYDRATE 500 MG
1000 CAPSULE ORAL 3 TIMES DAILY
Qty: 270 CAPSULE | Refills: 1 | Status: SHIPPED
Start: 2021-08-25 | End: 2021-10-13 | Stop reason: SDUPTHER

## 2021-08-25 RX ORDER — ASPIRIN 81 MG/1
TABLET, CHEWABLE ORAL
Qty: 90 TABLET | Refills: 1 | Status: SHIPPED
Start: 2021-08-25 | End: 2021-09-20 | Stop reason: SDUPTHER

## 2021-08-25 ASSESSMENT — ENCOUNTER SYMPTOMS
ORTHOPNEA: 0
EYE REDNESS: 0
BLOOD IN STOOL: 0
BACK PAIN: 0
RECTAL PAIN: 0
STRIDOR: 0
BLURRED VISION: 0
TROUBLE SWALLOWING: 0
SINUS PRESSURE: 0
ALLERGIC/IMMUNOLOGIC NEGATIVE: 1
VOICE CHANGE: 0
CHEST TIGHTNESS: 0
GASTROINTESTINAL NEGATIVE: 1
EYE PAIN: 0
SINUS PAIN: 0
EYE ITCHING: 0
DIARRHEA: 0
CONSTIPATION: 0
APNEA: 0
ABDOMINAL DISTENTION: 0
COLOR CHANGE: 0
FACIAL SWELLING: 0
PHOTOPHOBIA: 0
ANAL BLEEDING: 0
CHOKING: 0
EYE DISCHARGE: 0
SHORTNESS OF BREATH: 1

## 2021-08-25 NOTE — PROGRESS NOTES
Joceline Client is a 64 y.o. male. HPI/Chief C/O:  Chief Complaint   Patient presents with    Other     PVD; 3 month follow up    Anorexia     No appetite. No Known Allergies  The patient is here for a medication list and treatment planning review  We will go over our care planning goals as well as take care of all refills  We will set up labs as well     Knee Pain   The incident occurred more than 1 week ago. The pain is present in the left knee and right knee. The quality of the pain is described as shooting and stabbing. The pain is at a severity of 8/10. The pain is severe. The pain has been worsening since onset. Associated symptoms include a loss of motion and muscle weakness. Pertinent negatives include no inability to bear weight, loss of sensation or tingling. Hypertension  Associated symptoms include shortness of breath. Pertinent negatives include no anxiety, blurred vision, chest pain, headaches, malaise/fatigue, neck pain, orthopnea, palpitations, peripheral edema, PND or sweats. Risk factors for coronary artery disease include smoking/tobacco exposure, stress, male gender, family history and dyslipidemia. Past treatments include lifestyle changes. The current treatment provides significant improvement. Compliance problems include exercise, diet and psychosocial issues. Hypertensive end-organ damage includes CVA and PVD. There is no history of angina, kidney disease, CAD/MI, heart failure, left ventricular hypertrophy or retinopathy. There is no history of chronic renal disease, coarctation of the aorta, hyperaldosteronism, hypercortisolism, hyperparathyroidism, a hypertension causing med, pheochromocytoma, renovascular disease, sleep apnea or a thyroid problem. ROS:  Review of Systems   Constitutional: Positive for unexpected weight change (weight loss). Negative for activity change, appetite change and malaise/fatigue. HENT: Negative.   Negative for dental problem, accident) 6/22/2020    Infarction of right basal ganglia ([address] 6/24/2020    Internal carotid artery occlusion, left 6/22/2020    Left leg weakness 6/22/2020    Osteoarthritis     Bilateral Knee, Back     Pain in left leg 4/29/2021    Paralysis of left upper extremity ([facility] [address] 6/22/2020     Past Surgical History:   Procedure Laterality Date    ANESTHESIA NERVE BLOCK Bilateral 4/11/2019    BILATERAL INTRA-ARTICULAR FACET JOINT INJECTION WITH FLUOROSCOPIC GUIDANCE AT L3-4 AND L4-5 performed by Satya Perez DO at [address] [facility] Left     Elbow     HERNIA REPAIR      NERVE BLOCK Bilateral 04/11/2019    NERVE BLOCK Left 08/01/2019    lumbar radiofrequency    NERVE BLOCK Right 10/10/2019    lumbar medial branch neurolysis     RADIOFREQUENCY ABLATION NERVES Left 8/1/2019    RADIOFREQUENCY ABLATION LEFT L3-4 AND LEFT L4-5 FACET JOINTS THEN RIGHT L3-4 AND L4-5 FACET JOINTS (CPT 37541) performed by Satya Perez DO at [address] [place] Right 10/10/2019    RADIOFREQUENCY ABLATION RIGHT L3-4 AND L4-5 FACET JOINTS performed by Satya Perez DO at [address] [facility]         Past Family Hx:  Reviewed with patient      Problem Relation Age of Onset    Stroke Mother    Vernon Paul COPD Mother     Diabetes Mother        Social Hx:  Reviewed with patient  Social History     Tobacco Use    Smoking status: Current Every Day Smoker     Packs/day: 1.00     Years: 40.00     Pack years: 40.00     Types: Cigarettes     Start date: 1/1/1983    Smokeless tobacco: Never Used    Tobacco comment: smokes 1-1.5 ppd   Substance Use Topics    Alcohol use: No     Comment: Recovering alcoholic 81/47/3475       OBJECTIVE  /64   Pulse 70   Temp 98.1 °F (36.7 °C)   Resp 18   Ht 5' 10\" (1.778 m)   Wt 121 lb (54.9 kg)   SpO2 96%   BMI 17.36 kg/m²     Problem List:  Sally Perera does not have any pertinent problems on file.     PHYS EX:  Physical Exam  Vitals and nursing note reviewed. Constitutional:       General: He is not in acute distress. Appearance: Normal appearance. He is well-developed. He is not ill-appearing, toxic-appearing or diaphoretic. HENT:      Head: Normocephalic and atraumatic. Right Ear: External ear normal. There is no impacted cerumen. Left Ear: External ear normal. There is no impacted cerumen. Nose: Nose normal. No congestion or rhinorrhea. Mouth/Throat:      Mouth: Mucous membranes are moist.      Pharynx: Oropharynx is clear. No oropharyngeal exudate or posterior oropharyngeal erythema. Eyes:      General: No scleral icterus. Right eye: No discharge. Left eye: No discharge. Neck:      Thyroid: No thyromegaly. Vascular: No carotid bruit or JVD. Trachea: No tracheal deviation. Cardiovascular:      Rate and Rhythm: Normal rate and regular rhythm. Pulses: Normal pulses. Heart sounds: Normal heart sounds. No murmur heard. No friction rub. No gallop. Pulmonary:      Effort: Pulmonary effort is normal. No respiratory distress. Breath sounds: Normal breath sounds. No stridor. No wheezing, rhonchi or rales. Chest:      Chest wall: No tenderness. Abdominal:      General: Bowel sounds are normal. There is no distension. Palpations: Abdomen is soft. There is no mass. Tenderness: There is no abdominal tenderness. There is no right CVA tenderness, left CVA tenderness, guarding or rebound. Hernia: No hernia is present. Genitourinary:     Penis: No tenderness. Musculoskeletal:         General: Tenderness (frozen left shoulder) present. No swelling, deformity or signs of injury. Normal range of motion. Cervical back: Normal range of motion and neck supple. No rigidity or tenderness. No muscular tenderness. Right lower leg: No edema. Left lower leg: No edema.       Comments: Bilateral knee pains      Lymphadenopathy:      Cervical: No cervical adenopathy. Skin:     General: Skin is warm. Coloration: Skin is not jaundiced or pale. Findings: No bruising, erythema, lesion or rash. Neurological:      General: No focal deficit present. Mental Status: He is alert and oriented to person, place, and time. Cranial Nerves: No cranial nerve deficit. Sensory: No sensory deficit. Motor: Weakness present. No abnormal muscle tone. Coordination: Coordination abnormal.      Gait: Gait abnormal.      Deep Tendon Reflexes: Reflexes abnormal.      Comments: Left side weakness post CVA          The 10-year ASCVD risk score (Kaitlyn Guillory, et al., 2013) is: 12.8%    Values used to calculate the score:      Age: 64 years      Sex: Male      Is Non- : No      Diabetic: No      Tobacco smoker: Yes      Systolic Blood Pressure: 904 mmHg      Is BP treated: No      HDL Cholesterol: 38 mg/dL      Total Cholesterol: 231 mg/dL    ASSESSMENT/PLAN  Bebe Eng was seen today for other and anorexia. Diagnoses and all orders for this visit:    PVD (peripheral vascular disease) ([facility]  -     clopidogrel (PLAVIX) 75 MG tablet; take 1 tablet by mouth once daily  -     folic acid (FOLVITE) 1 MG tablet; Take 1 tablet by mouth daily.  -     Basic Metabolic Panel; Future  -     CBC Auto Differential; Future    ---VASCULAR PANEL  A) ASA, PLAVIX, aggrenox  B) coumadin, PLETAL, tzd, statin  C) ace, hctz, FOLIC, ccb  D) cannikinumab, FISH OILS    ---CARDIAC---ASA, ace, beta, statin, hctz, ( ccb )    Acute CVA (cerebrovascular accident) ([facility]  -     clopidogrel (PLAVIX) 75 MG tablet; take 1 tablet by mouth once daily  -     aspirin (RA ASPIRIN ADULT LOW STRENGTH) 81 MG chewable tablet; Take 1 tablet by mouth once daily. -     folic acid (FOLVITE) 1 MG tablet; Take 1 tablet by mouth daily.  -     Basic Metabolic Panel;  Future  -     CBC Auto Differential; Future  Long talk on treatment and prevention  Literature is given   --stable (FOLVITE) 1 MG tablet Take 1 tablet by mouth daily. 90 tablet 1    Omega-3 Fatty Acids (FISH OIL) 1000 MG CAPS Take 1 capsule by mouth 3 times daily 270 capsule 1    ibuprofen (ADVIL;MOTRIN) 800 MG tablet Take 1 tablet by mouth nightly 90 tablet 1    midodrine (PROAMATINE) 5 MG tablet Take 1 tablet by mouth 3 times daily 90 tablet 3    gabapentin (NEURONTIN) 300 MG capsule Take 1 capsule by mouth 3 times daily for 60 days. (Patient taking differently: Take 300 mg by mouth 2 times daily. ) 90 capsule 2    tiZANidine (ZANAFLEX) 4 MG tablet Take 1 tablet by mouth 3 times daily 90 tablet 2    cilostazol (PLETAL) 50 MG tablet Take 1 tablet by mouth 2 times daily 180 tablet 1    Liniments (ABSORBINE ARTHRITIS STRENGTH EX) Apply topically Horse liniment.  [DISCONTINUED] clopidogrel (PLAVIX) 75 MG tablet take 1 tablet by mouth once daily 30 tablet 0    [DISCONTINUED] aspirin (RA ASPIRIN ADULT LOW STRENGTH) 81 MG chewable tablet Take 1 tablet by mouth once daily. Further refill requires appointment. 30 tablet 0    [DISCONTINUED] folic acid (FOLVITE) 1 MG tablet Take 1 tablet by mouth daily. 90 tablet 1    [DISCONTINUED] Omega-3 Fatty Acids (FISH OIL) 1000 MG CAPS Take 1 capsule by mouth 3 times daily 90 capsule 5    [DISCONTINUED] ibuprofen (ADVIL;MOTRIN) 800 MG tablet Take 1 tablet by mouth nightly 90 tablet 1     No facility-administered encounter medications on file as of 8/25/2021. Return in about 6 months (around 2/25/2022).         Reviewed recent labs related to Christiano's current problems      Discussed importance of regular Health Maintenance follow up  Health Maintenance   Topic    Pneumococcal 0-64 years Vaccine (1 of 2 - PPSV23)    COVID-19 Vaccine (1)    HIV screen     Colon cancer screen colonoscopy     Shingles Vaccine (1 of 2)    Low dose CT lung screening     Flu vaccine (1)    Lipid screen     DTaP/Tdap/Td vaccine (2 - Td or Tdap)    Hepatitis C screen     Hepatitis A vaccine  Hepatitis B vaccine     Hib vaccine     Meningococcal (ACWY) vaccine                                              Low Dose CT (LDCT) Lung Screening criteria met   Age 50-69   Pack year smoking >30   Still smoking or less than 15 year since quit   No sign or symptoms of lung cancer   > 11 months since last LDCT     Risks and benefits of lung cancer screening with LDCT scans discussed:    Significance of positive screen - False-positive LDCT results often occur. 95% of all positive results do not lead to a diagnosis of cancer. Usually further imaging can resolve most false-positive results; however, some patients may require invasive procedures. Over diagnosis risk - 10% to 12% of screen-detected lung cancer cases are over diagnosedthat is, the cancer would not have been detected in the patient's lifetime without the screening. Need for follow up screens annually to continue lung cancer screening effectiveness     Risks associated with radiation from annual LDCT- Radiation exposure is about the same as for a mammogram, which is about 1/3 of the annual background radiation exposure from everyday life. Starting screening at age 54 is not likely to increase cancer risk from radiation exposure. Patients with comorbidities resulting in life expectancy of < 10 years, or that would preclude treatment of an abnormality identified on CT, should not be screened due to lack of benefit.     To obtain maximal benefit from this screening, smoking cessation and long-term abstinence from smoking is critical

## 2021-09-20 DIAGNOSIS — I63.9 ACUTE CVA (CEREBROVASCULAR ACCIDENT) (HCC): ICD-10-CM

## 2021-09-21 RX ORDER — ASPIRIN 81 MG/1
TABLET, CHEWABLE ORAL
Qty: 90 TABLET | Refills: 1 | Status: SHIPPED
Start: 2021-09-21 | End: 2022-03-25

## 2021-10-13 DIAGNOSIS — I73.9 PVD (PERIPHERAL VASCULAR DISEASE) (HCC): ICD-10-CM

## 2021-10-13 DIAGNOSIS — M75.02 ADHESIVE CAPSULITIS OF LEFT SHOULDER: ICD-10-CM

## 2021-10-13 DIAGNOSIS — I63.9 ACUTE CVA (CEREBROVASCULAR ACCIDENT) (HCC): ICD-10-CM

## 2021-10-13 DIAGNOSIS — E78.5 DYSLIPIDEMIA: ICD-10-CM

## 2021-10-13 RX ORDER — CHLORAL HYDRATE 500 MG
1000 CAPSULE ORAL 3 TIMES DAILY
Qty: 270 CAPSULE | Refills: 1 | Status: SHIPPED
Start: 2021-10-13 | End: 2022-03-25

## 2021-10-13 RX ORDER — FOLIC ACID 1 MG/1
TABLET ORAL
Qty: 90 TABLET | Refills: 1 | Status: SHIPPED
Start: 2021-10-13 | End: 2022-03-25

## 2021-10-13 RX ORDER — IBUPROFEN 800 MG/1
800 TABLET ORAL NIGHTLY
Qty: 90 TABLET | Refills: 1 | Status: SHIPPED
Start: 2021-10-13 | End: 2022-05-20

## 2021-11-29 DIAGNOSIS — I63.9 ACUTE CVA (CEREBROVASCULAR ACCIDENT) (HCC): ICD-10-CM

## 2021-11-29 DIAGNOSIS — I73.9 PVD (PERIPHERAL VASCULAR DISEASE) (HCC): ICD-10-CM

## 2021-11-29 RX ORDER — CILOSTAZOL 50 MG/1
TABLET ORAL
Qty: 180 TABLET | Refills: 0 | Status: SHIPPED
Start: 2021-11-29 | End: 2022-02-28

## 2022-02-28 DIAGNOSIS — I63.9 ACUTE CVA (CEREBROVASCULAR ACCIDENT) (HCC): ICD-10-CM

## 2022-02-28 DIAGNOSIS — I73.9 PVD (PERIPHERAL VASCULAR DISEASE) (HCC): ICD-10-CM

## 2022-02-28 RX ORDER — CILOSTAZOL 50 MG/1
TABLET ORAL
Qty: 60 TABLET | Refills: 0 | Status: SHIPPED
Start: 2022-02-28 | End: 2022-03-30 | Stop reason: SDUPTHER

## 2022-03-23 DIAGNOSIS — E78.5 DYSLIPIDEMIA: ICD-10-CM

## 2022-03-23 DIAGNOSIS — I63.9 ACUTE CVA (CEREBROVASCULAR ACCIDENT) (HCC): ICD-10-CM

## 2022-03-23 DIAGNOSIS — I73.9 PVD (PERIPHERAL VASCULAR DISEASE) (HCC): ICD-10-CM

## 2022-03-25 RX ORDER — FOLIC ACID 1 MG/1
TABLET ORAL
Qty: 90 TABLET | Refills: 1 | Status: SHIPPED | OUTPATIENT
Start: 2022-03-25

## 2022-03-25 RX ORDER — OMEGA-3 FATTY ACIDS/FISH OIL 300-500 MG
CAPSULE ORAL
Qty: 270 CAPSULE | Refills: 1 | Status: SHIPPED | OUTPATIENT
Start: 2022-03-25

## 2022-03-25 RX ORDER — ASPIRIN 81 MG/1
TABLET ORAL
Qty: 90 TABLET | Refills: 1 | Status: SHIPPED | OUTPATIENT
Start: 2022-03-25

## 2022-03-29 DIAGNOSIS — I63.9 ACUTE CVA (CEREBROVASCULAR ACCIDENT) (HCC): ICD-10-CM

## 2022-03-29 DIAGNOSIS — I73.9 PVD (PERIPHERAL VASCULAR DISEASE) (HCC): ICD-10-CM

## 2022-03-30 RX ORDER — CILOSTAZOL 50 MG/1
TABLET ORAL
Qty: 60 TABLET | Refills: 0 | Status: SHIPPED
Start: 2022-03-30 | End: 2022-07-28

## 2022-03-30 NOTE — TELEPHONE ENCOUNTER
Patient called for refill. Chart reviewed - Rx sent to the pharmacy.     Electronically signed by Apple Riddle MA on 3/30/22 at 9:00 AM EDT

## 2022-03-31 ENCOUNTER — TELEPHONE (OUTPATIENT)
Dept: FAMILY MEDICINE CLINIC | Age: 57
End: 2022-03-31

## 2022-03-31 NOTE — TELEPHONE ENCOUNTER
----- Message from Reesa Claude sent at 3/28/2022  5:37 PM EDT -----  Subject: Refill Request    QUESTIONS  Name of Medication? cilostazol (PLETAL) 50 MG tablet  Patient-reported dosage and instructions? 1 pill twice daily  How many days do you have left? 2  Preferred Pharmacy? [address] phone number (if available)? [phone]  Additional Information for Provider? 30 day supply. Pharmacy is making   delivery tomorrow needs new refill.  ---------------------------------------------------------------------------  --------------  CALL BACK INFO  What is the best way for the office to contact you? OK to leave message on   voicemail  Preferred Call Back Phone Number?  [phone]

## 2022-05-04 ENCOUNTER — TELEPHONE (OUTPATIENT)
Dept: VASCULAR SURGERY | Age: 57
End: 2022-05-04

## 2022-05-04 NOTE — TELEPHONE ENCOUNTER
Called to confirm appt for 5-5-2022 with Dr Fabian Lewis  And he is unable to make it.  Rescheduled for 5- at 10 am.
no

## 2022-05-20 DIAGNOSIS — M75.02 ADHESIVE CAPSULITIS OF LEFT SHOULDER: ICD-10-CM

## 2022-05-20 RX ORDER — IBUPROFEN 800 MG/1
TABLET ORAL
Qty: 30 TABLET | Refills: 0 | Status: SHIPPED
Start: 2022-05-20 | End: 2022-08-29

## 2022-07-23 DIAGNOSIS — I63.9 ACUTE CVA (CEREBROVASCULAR ACCIDENT) (HCC): ICD-10-CM

## 2022-07-23 DIAGNOSIS — I73.9 PVD (PERIPHERAL VASCULAR DISEASE) (HCC): ICD-10-CM

## 2022-07-25 ENCOUNTER — TELEPHONE (OUTPATIENT)
Dept: FAMILY MEDICINE CLINIC | Age: 57
End: 2022-07-25

## 2022-07-25 NOTE — TELEPHONE ENCOUNTER
Last seen 8/25/2021  Next appt Visit date not found      Called and left message for pt to call the office to schedule.     Electronically signed by Bartolome Vega MA on 7/25/22 at 8:36 AM EDT

## 2022-07-25 NOTE — TELEPHONE ENCOUNTER
Pt called through nurse triage. Pt states he is having extreme headaches, swelling in his left leg. Pt states he had a stroke 2 years ago. Pt states he is concerned about him having the leg pain and swelling. I advised the pt he needs to go to the ED to be evaluated. Pt declined. Pt would like to know if he can be seen when doctor comes back into town. I told the pt this could be very serious and he needs to be seen sooner than the next available appt we have. Pt states he is going to call his doctor he was following after having the stroke and if he cannot get into their office he would be going to the ED. I advised pt he should go to the ED regardless. Pt states he will call the office to let us know if he will go or not.      Electronically signed by Rabia Dinh on 7/25/22 at 9:41 AM EDT

## 2022-07-26 ENCOUNTER — APPOINTMENT (OUTPATIENT)
Dept: CT IMAGING | Age: 57
End: 2022-07-26
Payer: MEDICAID

## 2022-07-26 ENCOUNTER — HOSPITAL ENCOUNTER (EMERGENCY)
Age: 57
Discharge: OTHER INSTITUTION | End: 2022-07-26
Attending: EMERGENCY MEDICINE
Payer: MEDICAID

## 2022-07-26 VITALS
SYSTOLIC BLOOD PRESSURE: 149 MMHG | TEMPERATURE: 98.2 F | RESPIRATION RATE: 18 BRPM | DIASTOLIC BLOOD PRESSURE: 75 MMHG | HEART RATE: 90 BPM | OXYGEN SATURATION: 95 % | WEIGHT: 121 LBS | BODY MASS INDEX: 17.36 KG/M2

## 2022-07-26 DIAGNOSIS — R51.9 ACUTE NONINTRACTABLE HEADACHE, UNSPECIFIED HEADACHE TYPE: Primary | ICD-10-CM

## 2022-07-26 LAB
ALBUMIN SERPL-MCNC: 4.8 G/DL (ref 3.5–5.2)
ALP BLD-CCNC: 96 U/L (ref 40–129)
ALT SERPL-CCNC: 17 U/L (ref 0–40)
ANION GAP SERPL CALCULATED.3IONS-SCNC: 17 MMOL/L (ref 7–16)
AST SERPL-CCNC: 18 U/L (ref 0–39)
BASOPHILS ABSOLUTE: 0.06 E9/L (ref 0–0.2)
BASOPHILS RELATIVE PERCENT: 0.5 % (ref 0–2)
BILIRUB SERPL-MCNC: 0.3 MG/DL (ref 0–1.2)
BUN BLDV-MCNC: 9 MG/DL (ref 6–20)
CALCIUM SERPL-MCNC: 9.6 MG/DL (ref 8.6–10.2)
CHLORIDE BLD-SCNC: 96 MMOL/L (ref 98–107)
CO2: 24 MMOL/L (ref 22–29)
CREAT SERPL-MCNC: 0.8 MG/DL (ref 0.7–1.2)
EOSINOPHILS ABSOLUTE: 0.12 E9/L (ref 0.05–0.5)
EOSINOPHILS RELATIVE PERCENT: 1.1 % (ref 0–6)
GFR AFRICAN AMERICAN: >60
GFR NON-AFRICAN AMERICAN: >60 ML/MIN/1.73
GLUCOSE BLD-MCNC: 115 MG/DL (ref 74–99)
HCT VFR BLD CALC: 37.6 % (ref 37–54)
HEMOGLOBIN: 13.1 G/DL (ref 12.5–16.5)
IMMATURE GRANULOCYTES #: 0.04 E9/L
IMMATURE GRANULOCYTES %: 0.4 % (ref 0–5)
LYMPHOCYTES ABSOLUTE: 2.16 E9/L (ref 1.5–4)
LYMPHOCYTES RELATIVE PERCENT: 19.8 % (ref 20–42)
MCH RBC QN AUTO: 31.1 PG (ref 26–35)
MCHC RBC AUTO-ENTMCNC: 34.8 % (ref 32–34.5)
MCV RBC AUTO: 89.3 FL (ref 80–99.9)
MONOCYTES ABSOLUTE: 1.08 E9/L (ref 0.1–0.95)
MONOCYTES RELATIVE PERCENT: 9.9 % (ref 2–12)
NEUTROPHILS ABSOLUTE: 7.46 E9/L (ref 1.8–7.3)
NEUTROPHILS RELATIVE PERCENT: 68.3 % (ref 43–80)
PDW BLD-RTO: 14.7 FL (ref 11.5–15)
PLATELET # BLD: 495 E9/L (ref 130–450)
PMV BLD AUTO: 8.7 FL (ref 7–12)
POTASSIUM SERPL-SCNC: 4.3 MMOL/L (ref 3.5–5)
RBC # BLD: 4.21 E12/L (ref 3.8–5.8)
SARS-COV-2, NAAT: NOT DETECTED
SODIUM BLD-SCNC: 137 MMOL/L (ref 132–146)
TOTAL PROTEIN: 7.7 G/DL (ref 6.4–8.3)
WBC # BLD: 10.9 E9/L (ref 4.5–11.5)

## 2022-07-26 PROCEDURE — 99284 EMERGENCY DEPT VISIT MOD MDM: CPT

## 2022-07-26 PROCEDURE — 6360000002 HC RX W HCPCS: Performed by: EMERGENCY MEDICINE

## 2022-07-26 PROCEDURE — 70450 CT HEAD/BRAIN W/O DYE: CPT

## 2022-07-26 PROCEDURE — 87635 SARS-COV-2 COVID-19 AMP PRB: CPT

## 2022-07-26 PROCEDURE — 93005 ELECTROCARDIOGRAM TRACING: CPT | Performed by: NURSE PRACTITIONER

## 2022-07-26 PROCEDURE — 6370000000 HC RX 637 (ALT 250 FOR IP): Performed by: EMERGENCY MEDICINE

## 2022-07-26 PROCEDURE — 96374 THER/PROPH/DIAG INJ IV PUSH: CPT

## 2022-07-26 PROCEDURE — 36415 COLL VENOUS BLD VENIPUNCTURE: CPT

## 2022-07-26 PROCEDURE — 96375 TX/PRO/DX INJ NEW DRUG ADDON: CPT

## 2022-07-26 PROCEDURE — 80053 COMPREHEN METABOLIC PANEL: CPT

## 2022-07-26 PROCEDURE — 85025 COMPLETE CBC W/AUTO DIFF WBC: CPT

## 2022-07-26 RX ORDER — METOCLOPRAMIDE HYDROCHLORIDE 5 MG/ML
10 INJECTION INTRAMUSCULAR; INTRAVENOUS ONCE
Status: COMPLETED | OUTPATIENT
Start: 2022-07-26 | End: 2022-07-26

## 2022-07-26 RX ORDER — CYCLOBENZAPRINE HCL 10 MG
10 TABLET ORAL ONCE
Status: COMPLETED | OUTPATIENT
Start: 2022-07-26 | End: 2022-07-26

## 2022-07-26 RX ORDER — ACETAMINOPHEN 500 MG
1000 TABLET ORAL ONCE
Status: COMPLETED | OUTPATIENT
Start: 2022-07-26 | End: 2022-07-26

## 2022-07-26 RX ORDER — DIPHENHYDRAMINE HYDROCHLORIDE 50 MG/ML
25 INJECTION INTRAMUSCULAR; INTRAVENOUS ONCE
Status: COMPLETED | OUTPATIENT
Start: 2022-07-26 | End: 2022-07-26

## 2022-07-26 RX ADMIN — METOCLOPRAMIDE 10 MG: 5 INJECTION, SOLUTION INTRAMUSCULAR; INTRAVENOUS at 20:12

## 2022-07-26 RX ADMIN — CYCLOBENZAPRINE 10 MG: 10 TABLET, FILM COATED ORAL at 21:43

## 2022-07-26 RX ADMIN — ACETAMINOPHEN 1000 MG: 500 TABLET ORAL at 20:11

## 2022-07-26 RX ADMIN — DIPHENHYDRAMINE HYDROCHLORIDE 25 MG: 50 INJECTION, SOLUTION INTRAMUSCULAR; INTRAVENOUS at 20:11

## 2022-07-26 ASSESSMENT — PAIN - FUNCTIONAL ASSESSMENT: PAIN_FUNCTIONAL_ASSESSMENT: NONE - DENIES PAIN

## 2022-07-26 ASSESSMENT — PAIN SCALES - GENERAL: PAINLEVEL_OUTOF10: 6

## 2022-07-26 NOTE — TELEPHONE ENCOUNTER
I called the pt and explained the importance of him going to the ED. Pt states he is now having chills and states he is having a terrible headache currently. I told the pt he needs to be evaluated and he said if he doesn't feel better in a little bit he will go. I again explained the importance in going to the ED. Pt states he will \"let us know\" if he goes to the ED.      Electronically signed by Rafael Miller on 7/26/22 at 2:21 PM EDT

## 2022-07-26 NOTE — ED PROVIDER NOTES
HPI:  7/26/22, Time: 7:49 PM EDT         Paramjit Santos is a 62 y.o. male presenting to the ED for headache beginning about one week ago. Symptoms have been moderate in severity, intermittent, temporarily improved by aspirin, worsened by nothing. He describes that pain as a shooting pain behind his eyes that occasionally radiates to his neck. No falls or trauma to his head. No associated new focal deficits. He has left sided weakness and numbness due to a prior stroke which he states is unchanged from baseline. He used a cane for ambulation normally. He denies chest pain, shortness of breath, fevers, cough, abdominal pain, emesis, and diarrhea. Patient states that he normally takes muscle relaxants for his neck pain. States he was previously taking Flexeril which helped better but his doctor recently changed it for unknown reasons. Review of Systems:   Pertinent positives and negatives are stated within HPI, all other systems reviewed and are negative.          --------------------------------------------- PAST HISTORY ---------------------------------------------  Past Medical History:  has a past medical history of Decreased dorsalis pedis pulse, H/O: CVA (cerebrovascular accident), Infarction of right basal ganglia ([address] Internal carotid artery occlusion, left, Left leg weakness, Osteoarthritis, Pain in left leg, and Paralysis of left upper extremity ([address] Past Surgical History:  has a past surgical history that includes Arm Surgery (Left); Nerve Block (Bilateral, 04/11/2019); Anesthesia Nerve Block (Bilateral, 4/11/2019); Nerve Block (Left, 08/01/2019); RADIOFREQUENCY ABLATION NERVES (Left, 8/1/2019); Nerve Block (Right, 10/10/2019); RADIOFREQUENCY ABLATION NERVES (Right, 10/10/2019); hernia repair; and Vasectomy. Social History:  reports that he has been smoking cigarettes. He started smoking about 39 years ago. He has a 40.00 pack-year smoking history.  He has never used smokeless tobacco. He reports that he does not drink alcohol and does not use drugs. Family History: family history includes COPD in his mother; Diabetes in his mother; Stroke in his mother. The patients home medications have been reviewed. Allergies: Patient has no known allergies.     -------------------------------------------------- RESULTS -------------------------------------------------  All laboratory and radiology results have been personally reviewed by myself   LABS:  Results for orders placed or performed during the hospital encounter of 07/26/22   COVID-19, Rapid    Specimen: Nasopharyngeal Swab   Result Value Ref Range    SARS-CoV-2, NAAT Not Detected Not Detected   CBC with Auto Differential   Result Value Ref Range    WBC 10.9 4.5 - 11.5 E9/L    RBC 4.21 3.80 - 5.80 E12/L    Hemoglobin 13.1 12.5 - 16.5 g/dL    Hematocrit 37.6 37.0 - 54.0 %    MCV 89.3 80.0 - 99.9 fL    MCH 31.1 26.0 - 35.0 pg    MCHC 34.8 (H) 32.0 - 34.5 %    RDW 14.7 11.5 - 15.0 fL    Platelets 587 (H) 354 - 450 E9/L    MPV 8.7 7.0 - 12.0 fL    Neutrophils % 68.3 43.0 - 80.0 %    Immature Granulocytes % 0.4 0.0 - 5.0 %    Lymphocytes % 19.8 (L) 20.0 - 42.0 %    Monocytes % 9.9 2.0 - 12.0 %    Eosinophils % 1.1 0.0 - 6.0 %    Basophils % 0.5 0.0 - 2.0 %    Neutrophils Absolute 7.46 (H) 1.80 - 7.30 E9/L    Immature Granulocytes # 0.04 E9/L    Lymphocytes Absolute 2.16 1.50 - 4.00 E9/L    Monocytes Absolute 1.08 (H) 0.10 - 0.95 E9/L    Eosinophils Absolute 0.12 0.05 - 0.50 E9/L    Basophils Absolute 0.06 0.00 - 0.20 E9/L   Comprehensive Metabolic Panel   Result Value Ref Range    Sodium 137 132 - 146 mmol/L    Potassium 4.3 3.5 - 5.0 mmol/L    Chloride 96 (L) 98 - 107 mmol/L    CO2 24 22 - 29 mmol/L    Anion Gap 17 (H) 7 - 16 mmol/L    Glucose 115 (H) 74 - 99 mg/dL    BUN 9 6 - 20 mg/dL    Creatinine 0.8 0.7 - 1.2 mg/dL    GFR Non-African American >60 >=60 mL/min/1.73    GFR African American >60     Calcium 9.6 8.6 - 10.2 mg/dL    Total Protein 7.7 6.4 - 8.3 g/dL    Albumin 4.8 3.5 - 5.2 g/dL    Total Bilirubin 0.3 0.0 - 1.2 mg/dL    Alkaline Phosphatase 96 40 - 129 U/L    ALT 17 0 - 40 U/L    AST 18 0 - 39 U/L       RADIOLOGY:  Interpreted by Radiologist.  [address]   Final Result   1. No acute intracranial hemorrhage or edema. 2. Chronic stroke involving right basal ganglia and left parietal lobe. 3. Minimal air-fluid level in sphenoid sinus related to acute sinusitis or   recent trauma. ------------------------- NURSING NOTES AND VITALS REVIEWED ---------------------------   The nursing notes within the ED encounter and vital signs as below have been reviewed. BP (!) 149/75   Pulse 90   Temp 98.2 °F (36.8 °C)   Resp 18   Wt 121 lb (54.9 kg)   SpO2 95%   BMI 17.36 kg/m²   Oxygen Saturation Interpretation: Normal      ---------------------------------------------------PHYSICAL EXAM--------------------------------------      Constitutional/General: Alert and oriented x3, appears uncomfortable, non toxic in NAD  Head: Normocephalic and atraumatic  Eyes: PERRL, EOMI  Mouth: Oropharynx clear, handling secretions, no trismus  Neck: Supple, full ROM, no nuchal rigidity, no meningeal signs  Pulmonary: Lungs clear to auscultation bilaterally, no wheezes, rales, or rhonchi. Not in respiratory distress  Cardiovascular:  Regular rate and rhythm, no murmurs, gallops, or rubs. 2+ distal pulses  Abdomen: Soft, non tender, non distended,   Extremities: Moves all extremities x 4. Warm and well perfused  Skin: warm and dry without rash  Neurologic: GCS 15, Facial symmetry. Speech clear. Cranial nerves intact. No drift to RUE or RLE. Drift and diminished strength to LUE and LLE and is chronic and baseline from previous stroke. No ataxia to RUE or bilateral LE. Mild ataxia to LUE. No nystagmus. No visual field deficits. Psych: Normal Affect.  Behavior normal.      ------------------------------ ED COURSE/MEDICAL DECISION MAKING----------------------  Medications   acetaminophen (TYLENOL) tablet 1,000 mg (1,000 mg Oral Given 7/26/22 2011)   metoclopramide (REGLAN) injection 10 mg (10 mg IntraVENous Given 7/26/22 2012)   diphenhydrAMINE (BENADRYL) injection 25 mg (25 mg IntraVENous Given 7/26/22 2011)   cyclobenzaprine (FLEXERIL) tablet 10 mg (10 mg Oral Given 7/26/22 2143)       Medical Decision Making/ED COURSE:   Patient is a 75-year-old male with history of previous stroke presenting with a headache. In the ED, patient was hemodynamically stable and afebrile. On exam, patient was overall nontoxic. He had no new focal neurologic deficits or ataxia. All of the patient's deficits are baseline from his prior stroke. Labs and CT AP obtained. Patient administered migraine cocktail and Flexeril. I reviewed and interpreted labs. Labs reassuring. No acute findings on head CT. Patient had resolution of his headache and was feeling markedly improved on reevaluation. He felt well for discharge home. No evidence of CVA in the ED. He is appropriate for discharge home. Supportive care measures and strict ED return precautions discussed. Advised PCP follow-up. Patient remained hemodynamically stable throughout ED course. ED Course as of 07/27/22 0022 Tue Jul 26, 2022 2129 Patient is feeling improved on re-evaluation. He states his headache has resolved. He states he still has some neck discomfort which he normally takes flexeril for. States this is not a new problem. He states his doctor recently changed his muscle relaxants, and the flexeril works better. He feels well for discharge home. [JA]      ED Course User Index  [JA] Brinda Juárez MD       Discharge Medication List as of 7/26/2022  9:30 PM        Brinda Juárez MD      Counseling:    The emergency provider has spoken with the patient and discussed todays results, in addition to providing specific details for the plan of care and counseling regarding the diagnosis and prognosis. Questions are answered at this time and they are agreeable with the plan.      --------------------------------- IMPRESSION AND DISPOSITION ---------------------------------    IMPRESSION  1. Acute nonintractable headache, unspecified headache type        DISPOSITION  Disposition: Discharge to home  Patient condition is stable      NOTE: This report was transcribed using voice recognition software.  Every effort was made to ensure accuracy; however, inadvertent computerized transcription errors may be present    IPrasanna MD, am the primary provider of this record       Prasanna Malhotra MD  07/27/22 0022

## 2022-07-26 NOTE — ED NOTES
Department of Emergency Medicine  FIRST PROVIDER TRIAGE NOTE             Independent [facility]LP           7/26/22  3:52 PM EDT    Date of Encounter: 7/26/22   MRN: [id number]      HPI: Kendra Garsia is a 62 y.o. male who presents to the ED for Headache (With neck stiffness, intermittent blurred vision, ongoing for 2 wks)    ROS: Negative for cp or sob. PE: Gen Appearance/Constitutional: alert  Musculoskeletal: moves all extremities x 4     Initial Plan of Care: All treatment areas with department are currently occupied. Plan to order/Initiate the following while awaiting opening in ED: labs and imaging studies.   Initiate Treatment-Testing, Proceed toTreatment Area When Bed Available for ED Attending/MLP to Continue Care    Electronically signed by DOUGLAS Rollins CNP   DD: 7/26/22      DOUGLAS Bonner CNP  07/26/22 155

## 2022-07-27 ENCOUNTER — TELEPHONE (OUTPATIENT)
Dept: FAMILY MEDICINE CLINIC | Age: 57
End: 2022-07-27

## 2022-07-27 LAB
EKG ATRIAL RATE: 81 BPM
EKG P AXIS: 51 DEGREES
EKG P-R INTERVAL: 124 MS
EKG Q-T INTERVAL: 396 MS
EKG QRS DURATION: 98 MS
EKG QTC CALCULATION (BAZETT): 460 MS
EKG R AXIS: 71 DEGREES
EKG T AXIS: 29 DEGREES
EKG VENTRICULAR RATE: 81 BPM

## 2022-07-27 NOTE — TELEPHONE ENCOUNTER
----- Message from Aleta Farrell sent at 7/27/2022  9:16 AM EDT -----  Subject: Appointment Request    Reason for Call: Established Patient Appointment needed: Routine Hospital   Follow Up    QUESTIONS    Reason for appointment request? No appointments available during search     Additional Information for Provider? Pt needs appt. Please call back.   ---------------------------------------------------------------------------  --------------  Dianne LORA  [phone]; OK to leave message on voicemail  ---------------------------------------------------------------------------  --------------  SCRIPT ANSWERS  COVID Screen: Lula Schaffer

## 2022-07-28 RX ORDER — CILOSTAZOL 50 MG/1
TABLET ORAL
Qty: 60 TABLET | Refills: 0 | Status: SHIPPED
Start: 2022-07-28 | End: 2022-08-10

## 2022-08-10 ENCOUNTER — OFFICE VISIT (OUTPATIENT)
Dept: FAMILY MEDICINE CLINIC | Age: 57
End: 2022-08-10
Payer: MEDICAID

## 2022-08-10 VITALS
BODY MASS INDEX: 20.33 KG/M2 | HEART RATE: 60 BPM | WEIGHT: 142 LBS | TEMPERATURE: 98.5 F | DIASTOLIC BLOOD PRESSURE: 78 MMHG | RESPIRATION RATE: 18 BRPM | HEIGHT: 70 IN | SYSTOLIC BLOOD PRESSURE: 102 MMHG

## 2022-08-10 DIAGNOSIS — R45.89 DEPRESSED MOOD: ICD-10-CM

## 2022-08-10 DIAGNOSIS — I63.9 INFARCTION OF RIGHT BASAL GANGLIA (HCC): ICD-10-CM

## 2022-08-10 DIAGNOSIS — J43.2 CENTRILOBULAR EMPHYSEMA (HCC): Primary | ICD-10-CM

## 2022-08-10 DIAGNOSIS — R51.9 NONINTRACTABLE HEADACHE, UNSPECIFIED CHRONICITY PATTERN, UNSPECIFIED HEADACHE TYPE: ICD-10-CM

## 2022-08-10 DIAGNOSIS — Z86.73 H/O: CVA (CEREBROVASCULAR ACCIDENT): ICD-10-CM

## 2022-08-10 DIAGNOSIS — I73.9 PVD (PERIPHERAL VASCULAR DISEASE) (HCC): ICD-10-CM

## 2022-08-10 PROCEDURE — 99214 OFFICE O/P EST MOD 30 MIN: CPT | Performed by: FAMILY MEDICINE

## 2022-08-10 RX ORDER — ROSUVASTATIN CALCIUM 5 MG/1
5 TABLET, COATED ORAL DAILY
Qty: 90 TABLET | Refills: 1 | Status: SHIPPED | OUTPATIENT
Start: 2022-08-10

## 2022-08-10 RX ORDER — DULOXETIN HYDROCHLORIDE 30 MG/1
30 CAPSULE, DELAYED RELEASE ORAL DAILY
Qty: 90 CAPSULE | Refills: 1 | Status: SHIPPED | OUTPATIENT
Start: 2022-08-10

## 2022-08-10 RX ORDER — TRAMADOL HYDROCHLORIDE 50 MG/1
TABLET ORAL
COMMUNITY
Start: 2022-07-23

## 2022-08-10 SDOH — ECONOMIC STABILITY: FOOD INSECURITY: WITHIN THE PAST 12 MONTHS, YOU WORRIED THAT YOUR FOOD WOULD RUN OUT BEFORE YOU GOT MONEY TO BUY MORE.: NEVER TRUE

## 2022-08-10 SDOH — ECONOMIC STABILITY: FOOD INSECURITY: WITHIN THE PAST 12 MONTHS, THE FOOD YOU BOUGHT JUST DIDN'T LAST AND YOU DIDN'T HAVE MONEY TO GET MORE.: NEVER TRUE

## 2022-08-10 ASSESSMENT — ENCOUNTER SYMPTOMS
CHOKING: 0
BLOOD IN STOOL: 0
BLURRED VISION: 0
APNEA: 0
CONSTIPATION: 0
EYE ITCHING: 0
GASTROINTESTINAL NEGATIVE: 1
DIARRHEA: 0
ALLERGIC/IMMUNOLOGIC NEGATIVE: 1
VOICE CHANGE: 0
SHORTNESS OF BREATH: 1
FACIAL SWELLING: 0
WHEEZING: 0
ABDOMINAL DISTENTION: 0
STRIDOR: 0
CHEST TIGHTNESS: 0
ANAL BLEEDING: 0
SINUS PAIN: 0
EYE DISCHARGE: 0
RECTAL PAIN: 0
TROUBLE SWALLOWING: 0
ORTHOPNEA: 0
COLOR CHANGE: 0

## 2022-08-10 ASSESSMENT — PATIENT HEALTH QUESTIONNAIRE - PHQ9
SUM OF ALL RESPONSES TO PHQ QUESTIONS 1-9: 2
SUM OF ALL RESPONSES TO PHQ QUESTIONS 1-9: 2
2. FEELING DOWN, DEPRESSED OR HOPELESS: 1
SUM OF ALL RESPONSES TO PHQ QUESTIONS 1-9: 2
SUM OF ALL RESPONSES TO PHQ QUESTIONS 1-9: 2
SUM OF ALL RESPONSES TO PHQ9 QUESTIONS 1 & 2: 2
1. LITTLE INTEREST OR PLEASURE IN DOING THINGS: 1

## 2022-08-10 ASSESSMENT — SOCIAL DETERMINANTS OF HEALTH (SDOH): HOW HARD IS IT FOR YOU TO PAY FOR THE VERY BASICS LIKE FOOD, HOUSING, MEDICAL CARE, AND HEATING?: NOT HARD AT ALL

## 2022-08-10 NOTE — PROGRESS NOTES
Josef Henrandez is a 62 y.o. male. HPI/Chief C/O:  Chief Complaint   Patient presents with    Headache     Pt presents to the office for headaches. Pt states he is having extreme headaches and dizziness. Pt states he has a 100% blockage in the back of his neck. Pt states the headaches have been getting worse. No Known Allergies  The patient is here for a medication list and treatment planning review  We will go over our care planning goals as well as take care of all refills  We will set up labs as well     C/O post ER for headaches     Hypertension  Associated symptoms include anxiety, neck pain and shortness of breath. Pertinent negatives include no blurred vision, chest pain, headaches, malaise/fatigue, orthopnea, palpitations, peripheral edema, PND or sweats. Risk factors for coronary artery disease include smoking/tobacco exposure, stress, male gender, family history and dyslipidemia. Past treatments include lifestyle changes. The current treatment provides significant improvement. Compliance problems include exercise, diet and psychosocial issues. Hypertensive end-organ damage includes CVA and PVD. There is no history of angina, kidney disease, CAD/MI, heart failure, left ventricular hypertrophy or retinopathy. There is no history of chronic renal disease, coarctation of the aorta, hyperaldosteronism, hypercortisolism, hyperparathyroidism, a hypertension causing med, pheochromocytoma, renovascular disease, sleep apnea or a thyroid problem. ROS:  Review of Systems   Constitutional:  Positive for fatigue and unexpected weight change (weight loss). Negative for activity change, appetite change, chills, diaphoresis and malaise/fatigue. HENT: Negative. Negative for congestion, dental problem, drooling, ear discharge, facial swelling, mouth sores, nosebleeds, postnasal drip, sinus pain, sneezing, trouble swallowing and voice change.     Eyes:  Negative for blurred vision, discharge, itching and visual disturbance. Respiratory:  Positive for shortness of breath. Negative for apnea, choking, chest tightness, wheezing and stridor. Cardiovascular: Negative. Negative for chest pain, palpitations, orthopnea, leg swelling and PND. Gastrointestinal: Negative. Negative for abdominal distention, anal bleeding, blood in stool, constipation, diarrhea and rectal pain. Endocrine: Negative. Negative for cold intolerance, heat intolerance, polydipsia, polyphagia and polyuria. Genitourinary: Negative. Negative for decreased urine volume, difficulty urinating, dysuria, enuresis, flank pain, frequency, genital sores, hematuria, penile discharge, penile pain, penile swelling, scrotal swelling, testicular pain and urgency. Musculoskeletal:  Positive for neck pain. Negative for arthralgias, joint swelling, myalgias and neck stiffness. C/O frozen left shoulder    Skin: Negative. Negative for color change, rash and wound. Allergic/Immunologic: Negative. Negative for environmental allergies, food allergies and immunocompromised state. Neurological:  Negative for headaches. Left side weakness post CVA   Hematological: Negative. Negative for adenopathy. Does not bruise/bleed easily. Psychiatric/Behavioral:  Positive for decreased concentration and dysphoric mood. Negative for agitation, behavioral problems, hallucinations, self-injury, sleep disturbance and suicidal ideas. The patient is not hyperactive.        Past Medical/Surgical Hx;  Reviewed with patient      Diagnosis Date    Decreased dorsalis pedis pulse 4/29/2021    H/O: CVA (cerebrovascular accident) 6/22/2020    Infarction of right basal ganglia ([address] 6/24/2020    Internal carotid artery occlusion, left 6/22/2020    Left leg weakness 6/22/2020    Osteoarthritis     Bilateral Knee, Back     Pain in left leg 4/29/2021    Paralysis of left upper extremity ([address] 6/22/2020     Past Surgical History:   Procedure Laterality Date  ANESTHESIA NERVE BLOCK Bilateral 4/11/2019    BILATERAL INTRA-ARTICULAR FACET JOINT INJECTION WITH FLUOROSCOPIC GUIDANCE AT L3-4 AND L4-5 performed by Marielle Davies DO at [address] Left     Elbow     HERNIA REPAIR      NERVE BLOCK Bilateral 04/11/2019    NERVE BLOCK Left 08/01/2019    lumbar radiofrequency    NERVE BLOCK Right 10/10/2019    lumbar medial branch neurolysis     RADIOFREQUENCY ABLATION NERVES Left 8/1/2019    RADIOFREQUENCY ABLATION LEFT L3-4 AND LEFT L4-5 FACET JOINTS THEN RIGHT L3-4 AND L4-5 FACET JOINTS (CPT 55046) performed by Marielle Davies DO at [address] Right 10/10/2019    RADIOFREQUENCY ABLATION RIGHT L3-4 AND L4-5 FACET JOINTS performed by Marielle Snare, DO at [facility]         Past Family Hx:  Reviewed with patient      Problem Relation Age of Onset    Stroke Mother     COPD Mother     Diabetes Mother        Social Hx:  Reviewed with patient  Social History     Tobacco Use    Smoking status: Every Day     Packs/day: 1.00     Years: 40.00     Pack years: 40.00     Types: Cigarettes     Start date: 1/1/1983    Smokeless tobacco: Never    Tobacco comments:     smokes 1-1.5 ppd   Substance Use Topics    Alcohol use: No     Comment: Recovering alcoholic 01/14/2618       OBJECTIVE  /78   Pulse 60   Temp 98.5 °F (36.9 °C) (Temporal)   Resp 18   Ht 5' 10\" (1.778 m)   Wt 142 lb (64.4 kg)   BMI 20.37 kg/m²     Problem List:  Mariya Rogers does not have any pertinent problems on file. PHYS EX:  Physical Exam  Vitals and nursing note reviewed. Constitutional:       General: He is not in acute distress. Appearance: Normal appearance. He is well-developed. He is not ill-appearing, toxic-appearing or diaphoretic. HENT:      Head: Normocephalic and atraumatic.       Right Ear: External ear normal.      Left Ear: External ear normal.      Nose: Nose normal. No EVERY EVENING 30 tablet 0    gabapentin (NEURONTIN) 300 MG capsule Take 1 capsule by mouth 3 times daily for 90 days. 90 capsule 2    tiZANidine (ZANAFLEX) 4 MG tablet Take 1 tablet by mouth 3 times daily 90 tablet 2    folic acid (FOLVITE) 1 MG tablet TAKE ONE TABLET BY MOUTH ONCE DAILY 90 tablet 1    Omega-3 Fatty Acids (RA FISH OIL) 1000 MG CAPS TAKE ONE CAPSULE BY MOUTH THREE TIMES DAILY 270 capsule 1    aspirin (ASPIRIN ADULT LOW STRENGTH) 81 MG EC tablet TAKE ONE TABLET BY MOUTH ONCE DAILY 90 tablet 1    clopidogrel (PLAVIX) 75 MG tablet take 1 tablet by mouth once daily 90 tablet 1    Liniments (ABSORBINE ARTHRITIS STRENGTH EX) Apply topically Horse liniment.  traMADol (ULTRAM) 50 MG tablet TAKE ONE TABLET BY MOUTH EVERY 8 HOURS AS NEEDED FOR PAIN      midodrine (PROAMATINE) 5 MG tablet Take 1 tablet by mouth 3 times daily (Patient not taking: Reported on 8/10/2022) 90 tablet 3     No facility-administered encounter medications on file as of 8/10/2022. No follow-ups on file.         Reviewed recent labs related to Christiano's current problems      Discussed importance of regular Health Maintenance follow up  Health Maintenance   Topic    COVID-19 Vaccine (1)    Pneumococcal 0-64 years Vaccine (1 - PCV)    HIV screen     Colorectal Cancer Screen     Shingles vaccine (1 of 2)    Low dose CT lung screening     Depression Screen     Prostate Specific Antigen (PSA) Screening or Monitoring     Flu vaccine (1)    Lipids     DTaP/Tdap/Td vaccine (2 - Td or Tdap)    Hepatitis C screen     Hepatitis A vaccine     Hepatitis B vaccine     Hib vaccine     Meningococcal (ACWY) vaccine

## 2022-08-27 DIAGNOSIS — M75.02 ADHESIVE CAPSULITIS OF LEFT SHOULDER: ICD-10-CM

## 2022-08-27 DIAGNOSIS — I63.9 ACUTE CVA (CEREBROVASCULAR ACCIDENT) (HCC): ICD-10-CM

## 2022-08-27 DIAGNOSIS — I73.9 PVD (PERIPHERAL VASCULAR DISEASE) (HCC): ICD-10-CM

## 2022-08-29 RX ORDER — IBUPROFEN 800 MG/1
TABLET ORAL
Qty: 30 TABLET | Refills: 0 | Status: SHIPPED
Start: 2022-08-29 | End: 2022-09-26

## 2022-08-29 RX ORDER — CILOSTAZOL 50 MG/1
TABLET ORAL
Qty: 60 TABLET | Refills: 0 | Status: SHIPPED
Start: 2022-08-29 | End: 2022-09-26

## 2022-09-26 DIAGNOSIS — I63.9 ACUTE CVA (CEREBROVASCULAR ACCIDENT) (HCC): ICD-10-CM

## 2022-09-26 DIAGNOSIS — I73.9 PVD (PERIPHERAL VASCULAR DISEASE) (HCC): ICD-10-CM

## 2022-09-26 DIAGNOSIS — M75.02 ADHESIVE CAPSULITIS OF LEFT SHOULDER: ICD-10-CM

## 2022-09-26 RX ORDER — IBUPROFEN 800 MG/1
TABLET ORAL
Qty: 30 TABLET | Refills: 0 | Status: SHIPPED | OUTPATIENT
Start: 2022-09-26

## 2022-09-26 RX ORDER — CILOSTAZOL 50 MG/1
TABLET ORAL
Qty: 60 TABLET | Refills: 0 | Status: SHIPPED | OUTPATIENT
Start: 2022-09-26

## 2022-11-22 DIAGNOSIS — M75.02 ADHESIVE CAPSULITIS OF LEFT SHOULDER: ICD-10-CM

## 2022-11-22 RX ORDER — IBUPROFEN 800 MG/1
TABLET ORAL
Qty: 30 TABLET | Refills: 0 | Status: SHIPPED | OUTPATIENT
Start: 2022-11-22

## 2022-11-28 DIAGNOSIS — E78.5 DYSLIPIDEMIA: ICD-10-CM

## 2022-11-28 DIAGNOSIS — I73.9 PVD (PERIPHERAL VASCULAR DISEASE) (HCC): ICD-10-CM

## 2022-11-28 DIAGNOSIS — I63.9 ACUTE CVA (CEREBROVASCULAR ACCIDENT) (HCC): ICD-10-CM

## 2022-11-28 RX ORDER — OMEGA-3 FATTY ACIDS/FISH OIL 300-500 MG
CAPSULE ORAL
Qty: 270 CAPSULE | Refills: 1 | Status: SHIPPED | OUTPATIENT
Start: 2022-11-28

## 2022-11-28 RX ORDER — FOLIC ACID 1 MG/1
TABLET ORAL
Qty: 90 TABLET | Refills: 1 | Status: SHIPPED | OUTPATIENT
Start: 2022-11-28

## 2022-11-28 RX ORDER — ASPIRIN 81 MG/1
TABLET ORAL
Qty: 90 TABLET | Refills: 1 | Status: SHIPPED | OUTPATIENT
Start: 2022-11-28

## 2022-11-28 NOTE — TELEPHONE ENCOUNTER
Last seen 8/10/2022  Next appt Visit date not found    Electronically signed by Aidee Barbosa on 11/28/22 at 8:12 AM EST

## 2022-12-22 DIAGNOSIS — I63.9 ACUTE CVA (CEREBROVASCULAR ACCIDENT) (HCC): ICD-10-CM

## 2022-12-22 DIAGNOSIS — I73.9 PVD (PERIPHERAL VASCULAR DISEASE) (HCC): ICD-10-CM

## 2022-12-22 DIAGNOSIS — M75.02 ADHESIVE CAPSULITIS OF LEFT SHOULDER: ICD-10-CM

## 2022-12-22 NOTE — TELEPHONE ENCOUNTER
Last seen 8/10/2022  Next appt Visit date not found    Electronically signed by Nabil Bonilla on 12/22/22 at 9:17 AM EST

## 2022-12-23 RX ORDER — IBUPROFEN 800 MG/1
TABLET ORAL
Qty: 30 TABLET | Refills: 0 | Status: SHIPPED | OUTPATIENT
Start: 2022-12-23

## 2022-12-23 RX ORDER — CILOSTAZOL 50 MG/1
TABLET ORAL
Qty: 60 TABLET | Refills: 0 | Status: SHIPPED | OUTPATIENT
Start: 2022-12-23

## 2023-01-25 DIAGNOSIS — M75.02 ADHESIVE CAPSULITIS OF LEFT SHOULDER: ICD-10-CM

## 2023-01-25 DIAGNOSIS — I73.9 PVD (PERIPHERAL VASCULAR DISEASE) (HCC): ICD-10-CM

## 2023-01-25 DIAGNOSIS — I63.9 ACUTE CVA (CEREBROVASCULAR ACCIDENT) (HCC): ICD-10-CM

## 2023-01-25 RX ORDER — CILOSTAZOL 50 MG/1
TABLET ORAL
Qty: 60 TABLET | Refills: 0 | Status: SHIPPED | OUTPATIENT
Start: 2023-01-25

## 2023-01-25 RX ORDER — IBUPROFEN 800 MG/1
TABLET ORAL
Qty: 30 TABLET | Refills: 0 | Status: SHIPPED | OUTPATIENT
Start: 2023-01-25

## 2023-02-24 DIAGNOSIS — I63.9 ACUTE CVA (CEREBROVASCULAR ACCIDENT) (HCC): ICD-10-CM

## 2023-02-24 DIAGNOSIS — I73.9 PVD (PERIPHERAL VASCULAR DISEASE) (HCC): ICD-10-CM

## 2023-02-24 DIAGNOSIS — M75.02 ADHESIVE CAPSULITIS OF LEFT SHOULDER: ICD-10-CM

## 2023-02-24 RX ORDER — IBUPROFEN 800 MG/1
TABLET ORAL
Qty: 30 TABLET | Refills: 0 | Status: SHIPPED | OUTPATIENT
Start: 2023-02-24

## 2023-02-24 RX ORDER — CILOSTAZOL 50 MG/1
TABLET ORAL
Qty: 60 TABLET | Refills: 0 | Status: SHIPPED | OUTPATIENT
Start: 2023-02-24

## 2023-02-24 NOTE — TELEPHONE ENCOUNTER
Last seen 8/10/2022  Next appt 3/24/2023    Electronically signed by Dane Loera on 2/24/23 at 1:47 PM EST

## 2023-03-20 NOTE — TELEPHONE ENCOUNTER
Last Appointment:  8/10/2022  Future Appointments   Date Time Provider Wilner Hough   3/24/2023 11:15 AM Elton Vallejo DO [facility]

## 2023-03-21 RX ORDER — ROSUVASTATIN CALCIUM 5 MG/1
TABLET, COATED ORAL
Qty: 90 TABLET | Refills: 1 | Status: SHIPPED | OUTPATIENT
Start: 2023-03-21

## 2023-03-24 ENCOUNTER — OFFICE VISIT (OUTPATIENT)
Dept: FAMILY MEDICINE CLINIC | Age: 58
End: 2023-03-24
Payer: MEDICAID

## 2023-03-24 VITALS
SYSTOLIC BLOOD PRESSURE: 112 MMHG | OXYGEN SATURATION: 98 % | BODY MASS INDEX: 18.9 KG/M2 | RESPIRATION RATE: 18 BRPM | DIASTOLIC BLOOD PRESSURE: 72 MMHG | WEIGHT: 132 LBS | TEMPERATURE: 97.5 F | HEART RATE: 81 BPM | HEIGHT: 70 IN

## 2023-03-24 DIAGNOSIS — Z12.11 SCREEN FOR COLON CANCER: ICD-10-CM

## 2023-03-24 DIAGNOSIS — R53.83 OTHER FATIGUE: ICD-10-CM

## 2023-03-24 DIAGNOSIS — J43.2 CENTRILOBULAR EMPHYSEMA (HCC): Primary | ICD-10-CM

## 2023-03-24 DIAGNOSIS — I73.9 PVD (PERIPHERAL VASCULAR DISEASE) (HCC): ICD-10-CM

## 2023-03-24 DIAGNOSIS — R73.03 PREDIABETES: ICD-10-CM

## 2023-03-24 DIAGNOSIS — E78.5 DYSLIPIDEMIA: ICD-10-CM

## 2023-03-24 DIAGNOSIS — M75.02 ADHESIVE CAPSULITIS OF LEFT SHOULDER: ICD-10-CM

## 2023-03-24 DIAGNOSIS — I63.9 INFARCTION OF RIGHT BASAL GANGLIA (HCC): ICD-10-CM

## 2023-03-24 DIAGNOSIS — Z12.5 SCREENING PSA (PROSTATE SPECIFIC ANTIGEN): ICD-10-CM

## 2023-03-24 DIAGNOSIS — I63.9 ACUTE CVA (CEREBROVASCULAR ACCIDENT) (HCC): ICD-10-CM

## 2023-03-24 DIAGNOSIS — Z86.73 H/O: CVA (CEREBROVASCULAR ACCIDENT): ICD-10-CM

## 2023-03-24 PROCEDURE — G8484 FLU IMMUNIZE NO ADMIN: HCPCS | Performed by: FAMILY MEDICINE

## 2023-03-24 PROCEDURE — 4004F PT TOBACCO SCREEN RCVD TLK: CPT | Performed by: FAMILY MEDICINE

## 2023-03-24 PROCEDURE — 3023F SPIROM DOC REV: CPT | Performed by: FAMILY MEDICINE

## 2023-03-24 PROCEDURE — 99214 OFFICE O/P EST MOD 30 MIN: CPT | Performed by: FAMILY MEDICINE

## 2023-03-24 PROCEDURE — G8427 DOCREV CUR MEDS BY ELIG CLIN: HCPCS | Performed by: FAMILY MEDICINE

## 2023-03-24 PROCEDURE — 3017F COLORECTAL CA SCREEN DOC REV: CPT | Performed by: FAMILY MEDICINE

## 2023-03-24 PROCEDURE — G8420 CALC BMI NORM PARAMETERS: HCPCS | Performed by: FAMILY MEDICINE

## 2023-03-24 RX ORDER — CILOSTAZOL 50 MG/1
50 TABLET ORAL 2 TIMES DAILY
Qty: 60 TABLET | Refills: 0 | Status: SHIPPED | OUTPATIENT
Start: 2023-03-24

## 2023-03-24 RX ORDER — FOLIC ACID 1 MG/1
1000 TABLET ORAL DAILY
Qty: 90 TABLET | Refills: 1 | Status: SHIPPED | OUTPATIENT
Start: 2023-03-24

## 2023-03-24 RX ORDER — CLOPIDOGREL BISULFATE 75 MG/1
TABLET ORAL
Qty: 90 TABLET | Refills: 1 | Status: SHIPPED | OUTPATIENT
Start: 2023-03-24

## 2023-03-24 RX ORDER — ASPIRIN 81 MG/1
TABLET ORAL
Qty: 90 TABLET | Refills: 1 | Status: SHIPPED | OUTPATIENT
Start: 2023-03-24

## 2023-03-24 RX ORDER — IBUPROFEN 800 MG/1
800 TABLET ORAL NIGHTLY
Qty: 30 TABLET | Refills: 0 | Status: CANCELLED | OUTPATIENT
Start: 2023-03-24

## 2023-03-24 RX ORDER — OMEGA-3 FATTY ACIDS/FISH OIL 300-500 MG
1 CAPSULE ORAL 3 TIMES DAILY
Qty: 270 CAPSULE | Refills: 1 | Status: SHIPPED | OUTPATIENT
Start: 2023-03-24

## 2023-03-24 RX ORDER — IBUPROFEN 600 MG/1
600 TABLET ORAL 3 TIMES DAILY PRN
Qty: 90 TABLET | Refills: 5 | Status: SHIPPED | OUTPATIENT
Start: 2023-03-24

## 2023-03-24 SDOH — ECONOMIC STABILITY: FOOD INSECURITY: WITHIN THE PAST 12 MONTHS, THE FOOD YOU BOUGHT JUST DIDN'T LAST AND YOU DIDN'T HAVE MONEY TO GET MORE.: NEVER TRUE

## 2023-03-24 SDOH — ECONOMIC STABILITY: INCOME INSECURITY: HOW HARD IS IT FOR YOU TO PAY FOR THE VERY BASICS LIKE FOOD, HOUSING, MEDICAL CARE, AND HEATING?: NOT HARD AT ALL

## 2023-03-24 SDOH — ECONOMIC STABILITY: HOUSING INSECURITY
IN THE LAST 12 MONTHS, WAS THERE A TIME WHEN YOU DID NOT HAVE A STEADY PLACE TO SLEEP OR SLEPT IN A SHELTER (INCLUDING NOW)?: NO

## 2023-03-24 SDOH — ECONOMIC STABILITY: FOOD INSECURITY: WITHIN THE PAST 12 MONTHS, YOU WORRIED THAT YOUR FOOD WOULD RUN OUT BEFORE YOU GOT MONEY TO BUY MORE.: NEVER TRUE

## 2023-03-24 ASSESSMENT — ENCOUNTER SYMPTOMS
BLOOD IN STOOL: 0
COLOR CHANGE: 0
RHINORRHEA: 0
VOICE CHANGE: 0
SHORTNESS OF BREATH: 1
SORE THROAT: 0
ANAL BLEEDING: 0
RECTAL PAIN: 0
COUGH: 0
EYE PAIN: 0
APNEA: 0
WHEEZING: 0
GASTROINTESTINAL NEGATIVE: 1
CHEST TIGHTNESS: 0
SINUS PRESSURE: 0
TROUBLE SWALLOWING: 0
FACIAL SWELLING: 0
SINUS PAIN: 0
NAUSEA: 0
CONSTIPATION: 0
DIARRHEA: 0
ORTHOPNEA: 0
EYE DISCHARGE: 0
EYE ITCHING: 0
CHOKING: 0
ABDOMINAL PAIN: 0
STRIDOR: 0
PHOTOPHOBIA: 0
ALLERGIC/IMMUNOLOGIC NEGATIVE: 1
BLURRED VISION: 0
EYE REDNESS: 0
BACK PAIN: 0
ABDOMINAL DISTENTION: 0

## 2023-03-24 ASSESSMENT — LIFESTYLE VARIABLES
HOW OFTEN DO YOU HAVE A DRINK CONTAINING ALCOHOL: NEVER
HOW MANY STANDARD DRINKS CONTAINING ALCOHOL DO YOU HAVE ON A TYPICAL DAY: PATIENT DOES NOT DRINK

## 2023-03-24 ASSESSMENT — PATIENT HEALTH QUESTIONNAIRE - PHQ9
SUM OF ALL RESPONSES TO PHQ QUESTIONS 1-9: 0
1. LITTLE INTEREST OR PLEASURE IN DOING THINGS: 0
SUM OF ALL RESPONSES TO PHQ QUESTIONS 1-9: 0
2. FEELING DOWN, DEPRESSED OR HOPELESS: 0
SUM OF ALL RESPONSES TO PHQ QUESTIONS 1-9: 0
SUM OF ALL RESPONSES TO PHQ QUESTIONS 1-9: 0
SUM OF ALL RESPONSES TO PHQ9 QUESTIONS 1 & 2: 0

## 2023-03-24 NOTE — PROGRESS NOTES
deficit present.      Mental Status: He is alert and oriented to person, place, and time.      Cranial Nerves: No cranial nerve deficit.      Sensory: Sensory deficit present.      Motor: Weakness present. No abnormal muscle tone.      Coordination: Coordination abnormal.      Gait: Gait abnormal.      Deep Tendon Reflexes: Reflexes abnormal.      Comments: Left side weakness post CVA       ASSESSMENT/PLAN  Christiano was seen today for medication refill and difficulty walking.    Diagnoses and all orders for this visit:    Centrilobular emphysema (HCC)  -     Comprehensive Metabolic Panel; Future  -     CBC with Auto Differential; Future  --stable on current care planning  -- continue treatment as we are meeting goals   --PLAN--aerosol accuneb 1.25 plus chest percussion--Rx      Acute CVA (cerebrovascular accident) ([facility])  -     aspirin (ASPIRIN ADULT LOW STRENGTH) 81 MG EC tablet; TAKE ONE TABLET BY MOUTH ONCE DAILY  -     cilostazol (PLETAL) 50 MG tablet; Take 1 tablet by mouth 2 times daily  -     folic acid (FOLVITE) 1 MG tablet; Take 1 tablet by mouth daily  -     clopidogrel (PLAVIX) 75 MG tablet; take 1 tablet by mouth once daily  -     Comprehensive Metabolic Panel; Future  -     CBC with Auto Differential; Future    ---VASCULAR PANEL  A) ASA, PLAVIX, aggrenox  B) coumadin, PLETAL, tzd, STATIN  C) ace, hctz, FOLIC ccb  D) cannikinumab, FISH OILS    ---CARDIAC---PLAVIX, ace, beta, STATIN, hctz, ( ccb )     PVD (peripheral vascular disease) ([facility])  -     cilostazol (PLETAL) 50 MG tablet; Take 1 tablet by mouth 2 times daily  -     folic acid (FOLVITE) 1 MG tablet; Take 1 tablet by mouth daily  -     clopidogrel (PLAVIX) 75 MG tablet; take 1 tablet by mouth once daily  -     Comprehensive Metabolic Panel; Future  -     CBC with Auto Differential; Future  --stable on current care planning  -- continue treatment as we are meeting goals       Adhesive capsulitis of left shoulder  -     Comprehensive Metabolic Panel;

## 2023-04-21 DIAGNOSIS — I73.9 PVD (PERIPHERAL VASCULAR DISEASE) (HCC): ICD-10-CM

## 2023-04-21 DIAGNOSIS — I63.9 ACUTE CVA (CEREBROVASCULAR ACCIDENT) (HCC): ICD-10-CM

## 2023-04-25 RX ORDER — CILOSTAZOL 50 MG/1
TABLET ORAL
Qty: 60 TABLET | Refills: 0 | Status: SHIPPED | OUTPATIENT
Start: 2023-04-25

## 2023-05-15 DIAGNOSIS — Z86.73 H/O: CVA (CEREBROVASCULAR ACCIDENT): ICD-10-CM

## 2023-05-15 DIAGNOSIS — R45.89 DEPRESSED MOOD: ICD-10-CM

## 2023-05-15 RX ORDER — DULOXETIN HYDROCHLORIDE 30 MG/1
CAPSULE, DELAYED RELEASE ORAL
Qty: 90 CAPSULE | Refills: 1 | Status: SHIPPED | OUTPATIENT
Start: 2023-05-15

## 2023-05-15 NOTE — TELEPHONE ENCOUNTER
Last seen 3/24/2023  Next appt Visit date not found    Electronically signed by Cinda Boothe LPN on 4/88/67 at 8:91 PM EDT

## 2023-09-09 DIAGNOSIS — I63.9 ACUTE CVA (CEREBROVASCULAR ACCIDENT) (HCC): ICD-10-CM

## 2023-09-09 DIAGNOSIS — I73.9 PVD (PERIPHERAL VASCULAR DISEASE) (HCC): ICD-10-CM

## 2023-09-11 RX ORDER — ROSUVASTATIN CALCIUM 5 MG/1
TABLET, COATED ORAL
Qty: 90 TABLET | Refills: 1 | Status: SHIPPED | OUTPATIENT
Start: 2023-09-11

## 2023-09-11 RX ORDER — CLOPIDOGREL BISULFATE 75 MG/1
TABLET ORAL
Qty: 90 TABLET | Refills: 1 | Status: SHIPPED | OUTPATIENT
Start: 2023-09-11

## 2023-10-06 RX ORDER — IBUPROFEN 600 MG/1
TABLET ORAL
Qty: 90 TABLET | Refills: 5 | OUTPATIENT
Start: 2023-10-06

## 2023-10-12 DIAGNOSIS — I73.9 PVD (PERIPHERAL VASCULAR DISEASE) (HCC): ICD-10-CM

## 2023-10-12 DIAGNOSIS — E78.5 DYSLIPIDEMIA: ICD-10-CM

## 2023-10-12 DIAGNOSIS — I63.9 ACUTE CVA (CEREBROVASCULAR ACCIDENT) (HCC): ICD-10-CM

## 2023-10-12 NOTE — TELEPHONE ENCOUNTER
Last Appointment:  3/24/2023  Future Appointments   Date Time Provider [address]   10/23/2023 11:45 AM Ethel Vallejo, DO [facility]

## 2023-10-13 RX ORDER — OMEGA-3 FATTY ACIDS/FISH OIL 300-500 MG
1 CAPSULE ORAL 3 TIMES DAILY
Qty: 90 CAPSULE | Refills: 0 | Status: SHIPPED | OUTPATIENT
Start: 2023-10-13 | End: 2023-11-12

## 2023-10-13 RX ORDER — ASPIRIN 81 MG/1
TABLET ORAL
Qty: 30 TABLET | Refills: 0 | Status: SHIPPED | OUTPATIENT
Start: 2023-10-13

## 2023-10-13 RX ORDER — CLOPIDOGREL BISULFATE 75 MG/1
TABLET ORAL
Qty: 30 TABLET | Refills: 0 | Status: SHIPPED | OUTPATIENT
Start: 2023-10-13

## 2023-10-13 RX ORDER — FOLIC ACID 1 MG/1
1000 TABLET ORAL DAILY
Qty: 30 TABLET | Refills: 0 | Status: SHIPPED | OUTPATIENT
Start: 2023-10-13 | End: 2023-11-12

## 2023-10-13 RX ORDER — IBUPROFEN 600 MG/1
TABLET ORAL
Qty: 90 TABLET | Refills: 0 | Status: SHIPPED | OUTPATIENT
Start: 2023-10-13

## 2023-10-23 ENCOUNTER — OFFICE VISIT (OUTPATIENT)
Dept: FAMILY MEDICINE CLINIC | Age: 58
End: 2023-10-23
Payer: MEDICAID

## 2023-10-23 VITALS
HEART RATE: 83 BPM | WEIGHT: 134.6 LBS | SYSTOLIC BLOOD PRESSURE: 110 MMHG | HEIGHT: 70 IN | RESPIRATION RATE: 20 BRPM | DIASTOLIC BLOOD PRESSURE: 60 MMHG | OXYGEN SATURATION: 97 % | TEMPERATURE: 97.7 F | BODY MASS INDEX: 19.27 KG/M2

## 2023-10-23 DIAGNOSIS — E78.2 MIXED HYPERLIPIDEMIA: ICD-10-CM

## 2023-10-23 DIAGNOSIS — M25.561 CHRONIC PAIN OF BOTH KNEES: ICD-10-CM

## 2023-10-23 DIAGNOSIS — G89.29 CHRONIC PAIN OF BOTH KNEES: ICD-10-CM

## 2023-10-23 DIAGNOSIS — J43.2 CENTRILOBULAR EMPHYSEMA (HCC): Primary | ICD-10-CM

## 2023-10-23 DIAGNOSIS — M25.562 CHRONIC PAIN OF BOTH KNEES: ICD-10-CM

## 2023-10-23 DIAGNOSIS — I73.9 PVD (PERIPHERAL VASCULAR DISEASE) (HCC): ICD-10-CM

## 2023-10-23 DIAGNOSIS — Z72.0 TOBACCO ABUSE: ICD-10-CM

## 2023-10-23 DIAGNOSIS — Z86.73 H/O: CVA (CEREBROVASCULAR ACCIDENT): ICD-10-CM

## 2023-10-23 PROCEDURE — 4004F PT TOBACCO SCREEN RCVD TLK: CPT | Performed by: FAMILY MEDICINE

## 2023-10-23 PROCEDURE — 3023F SPIROM DOC REV: CPT | Performed by: FAMILY MEDICINE

## 2023-10-23 PROCEDURE — 99214 OFFICE O/P EST MOD 30 MIN: CPT | Performed by: FAMILY MEDICINE

## 2023-10-23 PROCEDURE — G8420 CALC BMI NORM PARAMETERS: HCPCS | Performed by: FAMILY MEDICINE

## 2023-10-23 PROCEDURE — 3017F COLORECTAL CA SCREEN DOC REV: CPT | Performed by: FAMILY MEDICINE

## 2023-10-23 PROCEDURE — G8484 FLU IMMUNIZE NO ADMIN: HCPCS | Performed by: FAMILY MEDICINE

## 2023-10-23 PROCEDURE — G8427 DOCREV CUR MEDS BY ELIG CLIN: HCPCS | Performed by: FAMILY MEDICINE

## 2023-10-23 PROCEDURE — 36415 COLL VENOUS BLD VENIPUNCTURE: CPT | Performed by: FAMILY MEDICINE

## 2023-10-23 RX ORDER — ALBUTEROL SULFATE 90 UG/1
2 AEROSOL, METERED RESPIRATORY (INHALATION) EVERY 6 HOURS PRN
Qty: 18 G | Refills: 3 | Status: SHIPPED | OUTPATIENT
Start: 2023-10-23

## 2023-10-23 ASSESSMENT — ENCOUNTER SYMPTOMS
WHEEZING: 0
SINUS PRESSURE: 0
NAUSEA: 0
SINUS PAIN: 0
RECTAL PAIN: 0
EYE ITCHING: 0
VOICE CHANGE: 0
ANAL BLEEDING: 0
SORE THROAT: 0
BLOOD IN STOOL: 0
EYE PAIN: 0
CHOKING: 0
CONSTIPATION: 0
TROUBLE SWALLOWING: 0
BLURRED VISION: 0
CHEST TIGHTNESS: 0
ABDOMINAL DISTENTION: 0
PHOTOPHOBIA: 0
EYE DISCHARGE: 0
COUGH: 0
ALLERGIC/IMMUNOLOGIC NEGATIVE: 1
SHORTNESS OF BREATH: 1
BACK PAIN: 0
APNEA: 0
COLOR CHANGE: 0
RHINORRHEA: 0
DIARRHEA: 0
ORTHOPNEA: 0
STRIDOR: 0
ABDOMINAL PAIN: 0
GASTROINTESTINAL NEGATIVE: 1
EYE REDNESS: 0
FACIAL SWELLING: 0

## 2023-10-24 RX ORDER — ROSUVASTATIN CALCIUM 10 MG/1
10 TABLET, COATED ORAL NIGHTLY
Qty: 30 TABLET | Refills: 3
Start: 2023-10-24

## 2023-11-05 DIAGNOSIS — R45.89 DEPRESSED MOOD: ICD-10-CM

## 2023-11-05 DIAGNOSIS — Z86.73 H/O: CVA (CEREBROVASCULAR ACCIDENT): ICD-10-CM

## 2023-11-06 RX ORDER — DULOXETIN HYDROCHLORIDE 30 MG/1
CAPSULE, DELAYED RELEASE ORAL
Qty: 90 CAPSULE | Refills: 1 | OUTPATIENT
Start: 2023-11-06

## 2023-11-09 NOTE — TELEPHONE ENCOUNTER
Last seen 10/23/2023  Next appt Visit date not found.     Electronically signed by Meryle Mustard, [facility] on 11/9/23 at 10:26 AM EST

## 2023-11-10 RX ORDER — IBUPROFEN 600 MG/1
TABLET ORAL
Qty: 90 TABLET | Refills: 0 | Status: SHIPPED | OUTPATIENT
Start: 2023-11-10

## 2023-12-09 DIAGNOSIS — E78.5 DYSLIPIDEMIA: ICD-10-CM

## 2023-12-11 RX ORDER — OMEGA-3 FATTY ACIDS/FISH OIL 300-500 MG
1 CAPSULE ORAL 3 TIMES DAILY
Qty: 270 CAPSULE | Refills: 0 | Status: SHIPPED | OUTPATIENT
Start: 2023-12-11

## 2024-01-16 RX ORDER — IBUPROFEN 600 MG/1
TABLET ORAL
Qty: 90 TABLET | Refills: 0 | Status: SHIPPED | OUTPATIENT
Start: 2024-01-16

## 2024-02-14 RX ORDER — IBUPROFEN 600 MG/1
TABLET ORAL
Qty: 90 TABLET | Refills: 0 | Status: SHIPPED | OUTPATIENT
Start: 2024-02-14

## 2024-03-08 NOTE — TELEPHONE ENCOUNTER
Last seen 10/23/2023  Next appt Visit date not found    Electronically signed by RYELY GUERRERO MA on 3/8/24 at 8:49 AM EST

## 2024-03-09 RX ORDER — ROSUVASTATIN CALCIUM 5 MG/1
TABLET, COATED ORAL
Qty: 90 TABLET | Refills: 1 | Status: SHIPPED | OUTPATIENT
Start: 2024-03-09

## 2024-03-22 DIAGNOSIS — E78.5 DYSLIPIDEMIA: ICD-10-CM

## 2024-03-22 RX ORDER — OMEGA-3 FATTY ACIDS/FISH OIL 300-500 MG
1 CAPSULE ORAL 3 TIMES DAILY
Qty: 90 CAPSULE | Refills: 0 | Status: SHIPPED | OUTPATIENT
Start: 2024-03-22 | End: 2024-04-21

## 2024-03-22 RX ORDER — IBUPROFEN 600 MG/1
TABLET ORAL
Qty: 90 TABLET | Refills: 0 | Status: SHIPPED | OUTPATIENT
Start: 2024-03-22

## 2024-03-22 NOTE — TELEPHONE ENCOUNTER
Last Appointment:  10/23/2023  No future appointments.   Reduced quantity to 30 days supply and notified pharmacy patient needs an appointment for further refills

## 2024-04-15 DIAGNOSIS — I73.9 PVD (PERIPHERAL VASCULAR DISEASE) (HCC): ICD-10-CM

## 2024-04-15 DIAGNOSIS — I63.9 ACUTE CVA (CEREBROVASCULAR ACCIDENT) (HCC): ICD-10-CM

## 2024-04-15 DIAGNOSIS — E78.5 DYSLIPIDEMIA: ICD-10-CM

## 2024-04-15 RX ORDER — CLOPIDOGREL BISULFATE 75 MG/1
TABLET ORAL
Qty: 30 TABLET | Refills: 0 | Status: SHIPPED | OUTPATIENT
Start: 2024-04-15

## 2024-04-15 RX ORDER — OMEGA-3 FATTY ACIDS/FISH OIL 300-500 MG
1 CAPSULE ORAL 3 TIMES DAILY
Qty: 90 CAPSULE | Refills: 0 | Status: SHIPPED | OUTPATIENT
Start: 2024-04-15 | End: 2024-05-15

## 2024-04-15 RX ORDER — IBUPROFEN 600 MG/1
TABLET ORAL
Qty: 90 TABLET | Refills: 0 | Status: SHIPPED | OUTPATIENT
Start: 2024-04-15

## 2024-04-15 NOTE — TELEPHONE ENCOUNTER
Last seen 10/23/2023  Next appt Visit date not found    Electronically signed by RYLEY GUERRERO MA on 4/15/24 at 2:57 PM EDT

## 2024-05-15 DIAGNOSIS — E78.5 DYSLIPIDEMIA: ICD-10-CM

## 2024-05-15 DIAGNOSIS — I73.9 PVD (PERIPHERAL VASCULAR DISEASE) (HCC): ICD-10-CM

## 2024-05-15 DIAGNOSIS — I63.9 ACUTE CVA (CEREBROVASCULAR ACCIDENT) (HCC): ICD-10-CM

## 2024-05-15 RX ORDER — CHLORAL HYDRATE 500 MG
1 CAPSULE ORAL 3 TIMES DAILY
Qty: 90 CAPSULE | Refills: 0 | Status: SHIPPED | OUTPATIENT
Start: 2024-05-15

## 2024-05-15 RX ORDER — IBUPROFEN 600 MG/1
TABLET ORAL
Qty: 90 TABLET | Refills: 0 | Status: SHIPPED | OUTPATIENT
Start: 2024-05-15

## 2024-05-15 RX ORDER — CLOPIDOGREL BISULFATE 75 MG/1
TABLET ORAL
Qty: 30 TABLET | Refills: 0 | Status: SHIPPED | OUTPATIENT
Start: 2024-05-15

## 2024-06-06 DIAGNOSIS — J43.2 CENTRILOBULAR EMPHYSEMA (HCC): ICD-10-CM

## 2024-06-07 RX ORDER — UMECLIDINIUM BROMIDE AND VILANTEROL TRIFENATATE 62.5; 25 UG/1; UG/1
POWDER RESPIRATORY (INHALATION)
Qty: 1 EACH | Refills: 0 | Status: SHIPPED | OUTPATIENT
Start: 2024-06-07

## 2024-07-19 DIAGNOSIS — I73.9 PVD (PERIPHERAL VASCULAR DISEASE) (HCC): ICD-10-CM

## 2024-07-19 DIAGNOSIS — E78.5 DYSLIPIDEMIA: ICD-10-CM

## 2024-07-19 DIAGNOSIS — I63.9 ACUTE CVA (CEREBROVASCULAR ACCIDENT) (HCC): ICD-10-CM

## 2024-07-19 DIAGNOSIS — J43.2 CENTRILOBULAR EMPHYSEMA (HCC): ICD-10-CM

## 2024-07-19 RX ORDER — UMECLIDINIUM BROMIDE AND VILANTEROL TRIFENATATE 62.5; 25 UG/1; UG/1
POWDER RESPIRATORY (INHALATION)
Qty: 1 EACH | Refills: 0 | Status: SHIPPED | OUTPATIENT
Start: 2024-07-19

## 2024-07-19 RX ORDER — CHLORAL HYDRATE 500 MG
1 CAPSULE ORAL 3 TIMES DAILY
Qty: 30 CAPSULE | Refills: 0 | Status: SHIPPED | OUTPATIENT
Start: 2024-07-19

## 2024-07-19 RX ORDER — IBUPROFEN 600 MG/1
TABLET ORAL
Qty: 30 TABLET | Refills: 0 | Status: SHIPPED | OUTPATIENT
Start: 2024-07-19

## 2024-07-19 RX ORDER — CLOPIDOGREL BISULFATE 75 MG/1
TABLET ORAL
Qty: 30 TABLET | Refills: 0 | Status: SHIPPED | OUTPATIENT
Start: 2024-07-19

## 2024-08-14 DIAGNOSIS — I63.9 ACUTE CVA (CEREBROVASCULAR ACCIDENT) (HCC): ICD-10-CM

## 2024-08-14 DIAGNOSIS — J43.2 CENTRILOBULAR EMPHYSEMA (HCC): ICD-10-CM

## 2024-08-14 DIAGNOSIS — E78.5 DYSLIPIDEMIA: ICD-10-CM

## 2024-08-14 DIAGNOSIS — I73.9 PVD (PERIPHERAL VASCULAR DISEASE) (HCC): ICD-10-CM

## 2024-08-14 RX ORDER — CHLORAL HYDRATE 500 MG
1 CAPSULE ORAL 3 TIMES DAILY
Qty: 30 CAPSULE | Refills: 0 | Status: SHIPPED | OUTPATIENT
Start: 2024-08-14

## 2024-08-14 RX ORDER — IBUPROFEN 600 MG/1
TABLET ORAL
Qty: 30 TABLET | Refills: 0 | Status: SHIPPED | OUTPATIENT
Start: 2024-08-14

## 2024-08-14 RX ORDER — CLOPIDOGREL BISULFATE 75 MG/1
TABLET ORAL
Qty: 30 TABLET | Refills: 0 | Status: SHIPPED | OUTPATIENT
Start: 2024-08-14

## 2024-08-14 RX ORDER — UMECLIDINIUM BROMIDE AND VILANTEROL TRIFENATATE 62.5; 25 UG/1; UG/1
POWDER RESPIRATORY (INHALATION)
Qty: 60 EACH | Refills: 0 | Status: SHIPPED | OUTPATIENT
Start: 2024-08-14

## 2024-09-11 RX ORDER — ROSUVASTATIN CALCIUM 5 MG/1
TABLET, COATED ORAL
Qty: 90 TABLET | Refills: 0 | OUTPATIENT
Start: 2024-09-11

## 2024-10-10 DIAGNOSIS — J43.2 CENTRILOBULAR EMPHYSEMA (HCC): ICD-10-CM

## 2024-10-11 RX ORDER — ALBUTEROL SULFATE 90 UG/1
INHALANT RESPIRATORY (INHALATION)
Qty: 18 G | Refills: 0 | Status: SHIPPED | OUTPATIENT
Start: 2024-10-11

## 2024-10-16 DIAGNOSIS — I63.9 ACUTE CVA (CEREBROVASCULAR ACCIDENT) (HCC): ICD-10-CM

## 2024-10-16 DIAGNOSIS — E78.5 DYSLIPIDEMIA: ICD-10-CM

## 2024-10-16 DIAGNOSIS — I73.9 PVD (PERIPHERAL VASCULAR DISEASE) (HCC): ICD-10-CM

## 2024-10-16 RX ORDER — CLOPIDOGREL BISULFATE 75 MG/1
TABLET ORAL
Qty: 30 TABLET | Refills: 0 | Status: SHIPPED | OUTPATIENT
Start: 2024-10-16

## 2024-10-16 RX ORDER — ROSUVASTATIN CALCIUM 5 MG/1
TABLET, COATED ORAL
Qty: 90 TABLET | Refills: 0 | Status: SHIPPED | OUTPATIENT
Start: 2024-10-16

## 2024-10-16 RX ORDER — IBUPROFEN 600 MG/1
TABLET, FILM COATED ORAL
Qty: 30 TABLET | Refills: 0 | Status: SHIPPED | OUTPATIENT
Start: 2024-10-16

## 2024-10-16 RX ORDER — CHLORAL HYDRATE 500 MG
1 CAPSULE ORAL 3 TIMES DAILY
Qty: 30 CAPSULE | Refills: 0 | Status: SHIPPED | OUTPATIENT
Start: 2024-10-16

## 2024-10-16 NOTE — TELEPHONE ENCOUNTER
Name of Medication(s) Requested:  Requested Prescriptions     Pending Prescriptions Disp Refills    clopidogrel (PLAVIX) 75 MG tablet [Pharmacy Med Name: clopidogrel 75 mg tablet] 30 tablet 0     Sig: TAKE ONE TABLET BY MOUTH ONCE DAILY    Omega-3 Fatty Acids (OMEGA-3 FISH OIL) 1000 MG CAPS [Pharmacy Med Name: omega 3-dha-epa-fish oil 300 mg-1,000 mg capsule] 30 capsule 0     Sig: TAKE ONE CAPSULE BY MOUTH THREE TIMES DAILY    ibuprofen (ADVIL;MOTRIN) 600 MG tablet [Pharmacy Med Name: ibuprofen 600 mg tablet] 30 tablet 0     Sig: TAKE ONE TABLET BY MOUTH THREE TIMES DAILY **patient needs an appointment in August FOR any further refills**    rosuvastatin (CRESTOR) 5 MG tablet [Pharmacy Med Name: rosuvastatin 5 mg tablet] 90 tablet 0     Sig: TAKE ONE TABLET BY MOUTH EVERY MORNING       Medication is on current medication list Yes    Dosage and directions were verified? Yes    Quantity verified: 30 day supply     Pharmacy Verified?  Yes    Last Appointment:  10/23/2023    Future appts:  No future appointments.     (If no appt send self scheduling link. .REFILLAPPT)  Scheduling request sent?     [] Yes  [x] No    Does patient need updated?  [] Yes  [x] No

## 2024-11-18 DIAGNOSIS — I73.9 PVD (PERIPHERAL VASCULAR DISEASE) (HCC): ICD-10-CM

## 2024-11-18 DIAGNOSIS — E78.5 DYSLIPIDEMIA: ICD-10-CM

## 2024-11-18 DIAGNOSIS — I63.9 ACUTE CVA (CEREBROVASCULAR ACCIDENT) (HCC): ICD-10-CM

## 2024-11-18 DIAGNOSIS — J43.2 CENTRILOBULAR EMPHYSEMA (HCC): ICD-10-CM

## 2024-11-18 RX ORDER — CLOPIDOGREL BISULFATE 75 MG/1
TABLET ORAL
Qty: 30 TABLET | Refills: 0 | Status: SHIPPED | OUTPATIENT
Start: 2024-11-18

## 2024-11-18 RX ORDER — IBUPROFEN 600 MG/1
TABLET, FILM COATED ORAL
Qty: 30 TABLET | Refills: 0 | Status: SHIPPED | OUTPATIENT
Start: 2024-11-18

## 2024-11-18 RX ORDER — CHLORAL HYDRATE 500 MG
1 CAPSULE ORAL 3 TIMES DAILY
Qty: 30 CAPSULE | Refills: 0 | Status: SHIPPED | OUTPATIENT
Start: 2024-11-18

## 2024-11-18 RX ORDER — ALBUTEROL SULFATE 90 UG/1
INHALANT RESPIRATORY (INHALATION)
Qty: 8.5 G | Refills: 0 | Status: SHIPPED | OUTPATIENT
Start: 2024-11-18

## 2024-11-18 NOTE — TELEPHONE ENCOUNTER
Name of Medication(s) Requested:  Requested Prescriptions     Pending Prescriptions Disp Refills    ibuprofen (ADVIL;MOTRIN) 600 MG tablet [Pharmacy Med Name: ibuprofen 600 mg tablet] 30 tablet 0     Sig: TAKE ONE TABLET BY MOUTH THREE TIMES DAILY **patient needs an appointment in August FOR any further refills**    clopidogrel (PLAVIX) 75 MG tablet [Pharmacy Med Name: clopidogrel 75 mg tablet] 30 tablet 0     Sig: TAKE ONE TABLET BY MOUTH ONCE DAILY    Omega-3 Fatty Acids (OMEGA-3 FISH OIL) 1000 MG CAPS [Pharmacy Med Name: omega 3-dha-epa-fish oil 300 mg-1,000 mg capsule] 30 capsule 0     Sig: TAKE ONE CAPSULE BY MOUTH THREE TIMES DAILY    albuterol sulfate HFA (PROVENTIL;VENTOLIN;PROAIR) 108 (90 Base) MCG/ACT inhaler [Pharmacy Med Name: albuterol sulfate HFA 90 mcg/actuation aerosol inhaler] 8.5 g 0     Sig: INHALE 2 puffs BY MOUTH AND INTO THE LUNGS EVERY 6 HOURS AS NEEDED FOR WHEEZING OR SHORTNESS OF BREATH. *need appointment FOR further refills)       Medication is on current medication list Yes    Dosage and directions were verified? Yes    Quantity verified: 30 day supply     Pharmacy Verified?  Yes    Last Appointment:  10/23/2023    Future appts:  No future appointments.     (If no appt send self scheduling link. .REFILLAPPT)  Scheduling request sent?     [] Yes  [x] No    Does patient need updated?  [] Yes  [x] No

## 2024-12-04 NOTE — ED PROVIDER NOTES
[facility]  Department of Emergency Medicine   ED  Encounter Note  Admit Date/RoomTime: 2021  1:25 PM  ED Room: ELPIDIO/ELPIDIO    NAME: Isabella Mina  : 1965  MRN: [id number]     Chief Complaint:  Laceration (approx 2 cm laceration to right index finger from tin can)    History of Present Illness       Isabella Mina is a 54 y.o. old male presenting to the emergency department by private vehicle, for a laceration to the PIP joint of the right index finger, caused by metal edge, which occurred at home approximately 1 hour(s) prior to arrival.  Patient states \"I was opening a tin can and it sliced my finger. \"  Patient does not think there is any evidence of foreign body and has full range of motion. Patient did thoroughly clean the before coming to the emergency department. There is not a possibility of retained foreign body in the affected area. Bleeding is controlled. He takes ASA and Plavix. There is minimal pain at injury site. Tetanus Status:  more than 10 years ago. ROS   Pertinent positives and negatives are stated within HPI, all other systems reviewed and are negative. Past Medical History:  has a past medical history of H/O: CVA (cerebrovascular accident), Infarction of right basal ganglia ([address] Internal carotid artery occlusion, left, Left leg weakness, Osteoarthritis, and Paralysis of left upper extremity ([address] Surgical History:  has a past surgical history that includes Arm Surgery (Left); Nerve Block (Bilateral, 2019); Anesthesia Nerve Block (Bilateral, 2019); Nerve Block (Left, 2019); RADIOFREQUENCY ABLATION NERVES (Left, 2019); Nerve Block (Right, 10/10/2019); RADIOFREQUENCY ABLATION NERVES (Right, 10/10/2019); hernia repair; and Vasectomy. Social History:  reports that he has been smoking cigarettes. He started smoking about 38 years ago. He has been smoking about 0.10 packs per day.  He has never used smokeless tobacco. He reports that he does not drink alcohol or use drugs. Family History: family history includes COPD in his mother; Diabetes in his mother; Stroke in his mother. Allergies: Patient has no known allergies. Physical Exam   Oxygen Saturation Interpretation: Normal.        ED Triage Vitals [04/25/21 1324]   BP Temp Temp Source Pulse Resp SpO2 Height Weight   130/76 96.8 °F (36 °C) Temporal 64 16 98 % 5' 10\" (1.778 m) 130 lb (59 kg)         Constitutional:  Alert, development consistent with age. HEENT:  NC/NT. Airway patent. Neck:  Normal ROM. Supple. Non-tender. Digits:   Right Index finger PIP joint dorsal aspect. Tenderness:  Mild over laceration. Swelling: None. Deformity: no.             ROM: full range of motion. Patient has full flexion extension no evidence of a tendon injury            Skin:  Linear, well approximated laceration 2 cm across PIP joint of R index finger. Neurovascular: Motor deficit: none. Sensory deficit: none. Pulse deficit: none. Capillary refill: normal.  Hand:              Tenderness:  none. Swelling: None. Deformity: no.             Skin:  normal exam; no wounds, erythema, or swelling. Lymphatics: No lymphangitis or adenopathy noted. Neurological:  Oriented. Motor functions intact. Lab / Imaging Results   (All laboratory and radiology results have been personally reviewed by myself)  Labs:  No results found for this visit on 04/25/21. Imaging: All Radiology results interpreted by Radiologist unless otherwise noted.   No orders to display     ED Course / Medical Decision Making     Medications   povidone-iodine (BETADINE) 10 % external solution (has no administration in time range)   HYDROcodone-acetaminophen (NORCO) 5-325 MG per tablet 1 tablet (has no administration in time range)   Tetanus-Diphth-Acell Pertussis (BOOSTRIX) injection 0.5 mL (0.5 mLs Intramuscular Given 4/25/21 9846)   lidocaine 2 % injection 5 mL (5 mLs Intradermal Given 4/25/21 9578)        Consult(s):   None    Procedure(s):   There were no wounds requiring formal closure. LACERATION REPAIR PROCEDURE NOTE: Risks, benefits and alternatives (for applicable procedures below) described. Performed By: Medical Student with my assistance}     Location: R PIP joint of index finger. Length: 2 cm. The wound area was irrigated with sterile saline, cleansed with povidone iodine, cleansend with shur-clens and draped in a sterile fashion. Local anesthesia Lidocaine 2% without epinephrine. The wound was explored with the following results: no foreign body or tendon injury seen. Debridement: None. Undermining: None. Wound Margins Revised: no.   Flaps Aligned: yes. Single layer wound closure was performed. The skin was closed with #2 4-0 Prolene using interrupted sutures. .   Dressing bacitracin and a sterile dressing was placed. MDM:  53 y/o male, currently on ASA and Plavix, presents for finger laceration to his R index finger after cutting it on a tin can just prior to arrival. The pt reports he tried to get the bleeding to stop for 20 minutes, but was unable to do so. His tetanus is not UTD. Pt denies numbness, tingling, CP, and SOB. Pt has a 2 cm linear, well approximated laceration to the dorsal PIP joint of his right index finger. Pt has full ROM and is neurovascularly intact. Pt had minimal pain to his finger. No evidence of a tendon injury. Imaging was not obtained based on low suspicion for fracture / bony abnormalityas per history/physical findings. Laceration was soaked and thoroughly cleaned with betadine. Laceration was repaired with 2 simple interrupted sutures with 4-0 prolene. Pt instructed to have the sutures removed in 7-10 days, he may have it done at the ED, PCP, or urgent care. Pt will be discharged home with splint for his finger.  Pt instructed to keep the splint on while he has the sutures in to improve the healing process. He will also be discharged home with bacitracin and instructed to apply it to the wound. Pt educated on signs and symptoms of infection including fever, chills, redness, streaking erythema, warmth, swelling, and increased pain. Pt verbalized understanding and was agreeable with plan of discharge. Patient was explicitly instructed on specific signs and symptoms on which to return to the emergency room for. Patient was instructed to return to the ER for any new or worsening symptoms. Additional discharge instructions were given verbally. All questions were answered. Patient is comfortable and agreeable with discharge plan. Patient in no acute distress and non-toxic in appearance. Plan of Care/Counseling:  I reviewed today's visit with the patient in addition to providing specific details for the plan of care and counseling regarding the diagnosis and prognosis. Questions are answered at this time and are agreeable with the plan. Assessment     1. Laceration of right index finger without foreign body without damage to nail, initial encounter      Plan   Discharge home. Patient condition is good    New Medications     Discharge Medication List as of 4/25/2021  3:20 PM      START taking these medications    Details   bacitracin-polymyxin b (POLYSPORIN) [id number] UNIT/GM ointment Apply topically 2 times daily. , Disp-1 Tube, R-1, Print           Electronically signed by Anusha Chavira PA-C   DD: 4/25/21  **This report was transcribed using voice recognition software. Every effort was made to ensure accuracy; however, inadvertent computerized transcription errors may be present.   END OF ED PROVIDER NOTE        Anusha Chavira PA-C  04/25/21 9917 Discharge plan

## 2024-12-18 DIAGNOSIS — J43.2 CENTRILOBULAR EMPHYSEMA (HCC): ICD-10-CM

## 2024-12-18 RX ORDER — ALBUTEROL SULFATE 90 UG/1
INHALANT RESPIRATORY (INHALATION)
Qty: 8.5 G | Refills: 0 | Status: SHIPPED | OUTPATIENT
Start: 2024-12-18

## 2024-12-18 NOTE — TELEPHONE ENCOUNTER
Last seen 10/23/2023  Next appt Visit date not found    Requested Prescriptions     Pending Prescriptions Disp Refills    albuterol sulfate HFA (PROVENTIL;VENTOLIN;PROAIR) 108 (90 Base) MCG/ACT inhaler [Pharmacy Med Name: albuterol sulfate HFA 90 mcg/actuation aerosol inhaler] 8.5 g 0     Sig: INHALE 2 puffs BY MOUTH AND INTO THE LUNGS EVERY 6 HOURS AS NEEDED FOR WHEEZING OR SHORTNESS OF BREATH. *need appointment FOR further refills)    Electronically signed by RYLEY GUERRERO MA on 12/18/24 at 11:31 AM EST

## (undated) DEVICE — TOWEL OR BLUEE 16X26IN ST 8 PACK ORB08 16X26ORTWL

## (undated) DEVICE — CVD CANNULA

## (undated) DEVICE — 6 ML SYRINGE LUER-LOCK TIP: Brand: MONOJECT

## (undated) DEVICE — ENCORE® LATEX TEXTURED SIZE 6.5, STERILE LATEX POWDER-FREE SURGICAL GLOVE: Brand: ENCORE

## (undated) DEVICE — GAUZE,SPONGE,4"X4",12PLY,STERILE,LF,2'S: Brand: MEDLINE

## (undated) DEVICE — NEEDLE HYPO 18GA L1.5IN PNK POLYPR HUB S STL THN WALL FILL

## (undated) DEVICE — 3M™ RED DOT™ MONITORING ELECTRODE WITH FOAM TAPE AND STICKY GEL 2560, 50/BAG, 20/CASE, 72/PLT: Brand: RED DOT™

## (undated) DEVICE — NEEDLE HYPO 25GA L1.5IN BLU POLYPR HUB S STL REG BVL STR

## (undated) DEVICE — Z DISCONTINUED APPLICATOR SURG PREP 0.35OZ 2% CHG 70% ISO ALC W/ HI LT

## (undated) DEVICE — BANDAGE ADH W1XL3IN NAT FAB WVN FLX DURABLE N ADH PD SEAL

## (undated) DEVICE — MARKER,SKIN,WI/RULER AND LABELS: Brand: MEDLINE

## (undated) DEVICE — 3 ML SYRINGE LUER-LOCK TIP: Brand: MONOJECT

## (undated) DEVICE — DRAPE SHEET, X-LARGE: Brand: CONVERTORS

## (undated) DEVICE — NEEDLE SPNL 22GA L3.5IN BLK HUB S STL REG WALL FIT STYL W/

## (undated) DEVICE — ELECTRODE SURG MPLR NEUT SELF ADH PT PLT MULTIGEN

## (undated) DEVICE — NON-DEHP CATHETER EXTENSION SET, MALE LUER LOCK ADAPTER